# Patient Record
Sex: MALE | Race: WHITE | NOT HISPANIC OR LATINO | Employment: OTHER | URBAN - METROPOLITAN AREA
[De-identification: names, ages, dates, MRNs, and addresses within clinical notes are randomized per-mention and may not be internally consistent; named-entity substitution may affect disease eponyms.]

---

## 2022-10-13 ENCOUNTER — TELEPHONE (OUTPATIENT)
Dept: HEMATOLOGY/ONCOLOGY | Facility: CLINIC | Age: 82
End: 2022-10-13
Payer: COMMERCIAL

## 2022-10-13 DIAGNOSIS — C43.9 SKIN CANCER (MELANOMA): Primary | ICD-10-CM

## 2022-10-17 ENCOUNTER — OFFICE VISIT (OUTPATIENT)
Dept: HEMATOLOGY/ONCOLOGY | Facility: CLINIC | Age: 82
End: 2022-10-17
Payer: COMMERCIAL

## 2022-10-17 ENCOUNTER — LAB VISIT (OUTPATIENT)
Dept: LAB | Facility: HOSPITAL | Age: 82
End: 2022-10-17
Attending: OTOLARYNGOLOGY
Payer: COMMERCIAL

## 2022-10-17 ENCOUNTER — DOCUMENTATION ONLY (OUTPATIENT)
Dept: HEMATOLOGY/ONCOLOGY | Facility: CLINIC | Age: 82
End: 2022-10-17
Payer: COMMERCIAL

## 2022-10-17 VITALS
BODY MASS INDEX: 27.76 KG/M2 | SYSTOLIC BLOOD PRESSURE: 128 MMHG | DIASTOLIC BLOOD PRESSURE: 67 MMHG | HEART RATE: 60 BPM | OXYGEN SATURATION: 97 % | HEIGHT: 73 IN | TEMPERATURE: 97 F | RESPIRATION RATE: 18 BRPM | WEIGHT: 209.44 LBS

## 2022-10-17 DIAGNOSIS — C43.9 SKIN CANCER (MELANOMA): ICD-10-CM

## 2022-10-17 DIAGNOSIS — I10 PRIMARY HYPERTENSION: ICD-10-CM

## 2022-10-17 DIAGNOSIS — C43.4 MALIGNANT MELANOMA OF SCALP: ICD-10-CM

## 2022-10-17 LAB
ALBUMIN SERPL BCP-MCNC: 3.8 G/DL (ref 3.5–5.2)
ALP SERPL-CCNC: 92 U/L (ref 55–135)
ALT SERPL W/O P-5'-P-CCNC: 25 U/L (ref 10–44)
ANION GAP SERPL CALC-SCNC: 10 MMOL/L (ref 8–16)
AST SERPL-CCNC: 26 U/L (ref 10–40)
BILIRUB SERPL-MCNC: 1 MG/DL (ref 0.1–1)
BUN SERPL-MCNC: 17 MG/DL (ref 8–23)
CALCIUM SERPL-MCNC: 9.5 MG/DL (ref 8.7–10.5)
CHLORIDE SERPL-SCNC: 103 MMOL/L (ref 95–110)
CO2 SERPL-SCNC: 26 MMOL/L (ref 23–29)
CREAT SERPL-MCNC: 1.4 MG/DL (ref 0.5–1.4)
EST. GFR  (NO RACE VARIABLE): 50.5 ML/MIN/1.73 M^2
GLUCOSE SERPL-MCNC: 108 MG/DL (ref 70–110)
POTASSIUM SERPL-SCNC: 4.4 MMOL/L (ref 3.5–5.1)
PROT SERPL-MCNC: 6.4 G/DL (ref 6–8.4)
SODIUM SERPL-SCNC: 139 MMOL/L (ref 136–145)

## 2022-10-17 PROCEDURE — 3078F DIAST BP <80 MM HG: CPT | Mod: CPTII,S$GLB,, | Performed by: OTOLARYNGOLOGY

## 2022-10-17 PROCEDURE — 3074F SYST BP LT 130 MM HG: CPT | Mod: CPTII,S$GLB,, | Performed by: OTOLARYNGOLOGY

## 2022-10-17 PROCEDURE — 3078F PR MOST RECENT DIASTOLIC BLOOD PRESSURE < 80 MM HG: ICD-10-PCS | Mod: CPTII,S$GLB,, | Performed by: OTOLARYNGOLOGY

## 2022-10-17 PROCEDURE — 3288F FALL RISK ASSESSMENT DOCD: CPT | Mod: CPTII,S$GLB,, | Performed by: OTOLARYNGOLOGY

## 2022-10-17 PROCEDURE — 11105 PUNCH BX SKIN EA SEP/ADDL: CPT | Mod: S$GLB,,, | Performed by: OTOLARYNGOLOGY

## 2022-10-17 PROCEDURE — 36415 COLL VENOUS BLD VENIPUNCTURE: CPT | Mod: PN | Performed by: OTOLARYNGOLOGY

## 2022-10-17 PROCEDURE — 1159F PR MEDICATION LIST DOCUMENTED IN MEDICAL RECORD: ICD-10-PCS | Mod: CPTII,S$GLB,, | Performed by: OTOLARYNGOLOGY

## 2022-10-17 PROCEDURE — 99999 PR PBB SHADOW E&M-EST. PATIENT-LVL V: ICD-10-PCS | Mod: PBBFAC,,, | Performed by: OTOLARYNGOLOGY

## 2022-10-17 PROCEDURE — 1101F PR PT FALLS ASSESS DOC 0-1 FALLS W/OUT INJ PAST YR: ICD-10-PCS | Mod: CPTII,S$GLB,, | Performed by: OTOLARYNGOLOGY

## 2022-10-17 PROCEDURE — 1160F PR REVIEW ALL MEDS BY PRESCRIBER/CLIN PHARMACIST DOCUMENTED: ICD-10-PCS | Mod: CPTII,S$GLB,, | Performed by: OTOLARYNGOLOGY

## 2022-10-17 PROCEDURE — 99203 OFFICE O/P NEW LOW 30 MIN: CPT | Mod: 25,S$GLB,, | Performed by: OTOLARYNGOLOGY

## 2022-10-17 PROCEDURE — 1126F PR PAIN SEVERITY QUANTIFIED, NO PAIN PRESENT: ICD-10-PCS | Mod: CPTII,S$GLB,, | Performed by: OTOLARYNGOLOGY

## 2022-10-17 PROCEDURE — 3074F PR MOST RECENT SYSTOLIC BLOOD PRESSURE < 130 MM HG: ICD-10-PCS | Mod: CPTII,S$GLB,, | Performed by: OTOLARYNGOLOGY

## 2022-10-17 PROCEDURE — 1159F MED LIST DOCD IN RCRD: CPT | Mod: CPTII,S$GLB,, | Performed by: OTOLARYNGOLOGY

## 2022-10-17 PROCEDURE — 3288F PR FALLS RISK ASSESSMENT DOCUMENTED: ICD-10-PCS | Mod: CPTII,S$GLB,, | Performed by: OTOLARYNGOLOGY

## 2022-10-17 PROCEDURE — 11104 PR PUNCH BIOPSY, SKIN, SINGLE LESION: ICD-10-PCS | Mod: S$GLB,,, | Performed by: OTOLARYNGOLOGY

## 2022-10-17 PROCEDURE — 11104 PUNCH BX SKIN SINGLE LESION: CPT | Mod: S$GLB,,, | Performed by: OTOLARYNGOLOGY

## 2022-10-17 PROCEDURE — 11105 PR PUNCH BIOPSY, SKIN, EA ADDTL LESION: ICD-10-PCS | Mod: S$GLB,,, | Performed by: OTOLARYNGOLOGY

## 2022-10-17 PROCEDURE — 1101F PT FALLS ASSESS-DOCD LE1/YR: CPT | Mod: CPTII,S$GLB,, | Performed by: OTOLARYNGOLOGY

## 2022-10-17 PROCEDURE — 1160F RVW MEDS BY RX/DR IN RCRD: CPT | Mod: CPTII,S$GLB,, | Performed by: OTOLARYNGOLOGY

## 2022-10-17 PROCEDURE — 80053 COMPREHEN METABOLIC PANEL: CPT | Mod: PN | Performed by: OTOLARYNGOLOGY

## 2022-10-17 PROCEDURE — 1126F AMNT PAIN NOTED NONE PRSNT: CPT | Mod: CPTII,S$GLB,, | Performed by: OTOLARYNGOLOGY

## 2022-10-17 PROCEDURE — 99203 PR OFFICE/OUTPT VISIT, NEW, LEVL III, 30-44 MIN: ICD-10-PCS | Mod: 25,S$GLB,, | Performed by: OTOLARYNGOLOGY

## 2022-10-17 PROCEDURE — 99999 PR PBB SHADOW E&M-EST. PATIENT-LVL V: CPT | Mod: PBBFAC,,, | Performed by: OTOLARYNGOLOGY

## 2022-10-17 RX ORDER — AMIODARONE HYDROCHLORIDE 200 MG/1
200 TABLET ORAL
COMMUNITY
Start: 2022-10-10

## 2022-10-17 RX ORDER — ACEBUTOLOL HYDROCHLORIDE 200 MG/1
200 CAPSULE ORAL NIGHTLY
COMMUNITY
Start: 2022-05-30

## 2022-10-17 RX ORDER — VALSARTAN AND HYDROCHLOROTHIAZIDE 80; 12.5 MG/1; MG/1
1 TABLET, FILM COATED ORAL
COMMUNITY
Start: 2022-09-02

## 2022-10-17 RX ORDER — ACETAMINOPHEN 325 MG/1
650 TABLET ORAL EVERY 4 HOURS PRN
COMMUNITY
Start: 2021-12-19

## 2022-10-17 NOTE — PROGRESS NOTES
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  Patient presented about 3-4 months ago scalp. Tneder to touch. NO bleeding. No pruritis. Applying triple antibiotic S/P biopsy    Patient denies numbness and weakness of his face.  He denies parotid and neck masses.    He has no previous history of melanoma or non melanoma skin cancer and no family history of melanoma.  Hx of sunburns as a child/young adult.    ROS: see HPI  Constitutional: Negative for activity change and appetite change, weight loss.   Eyes: Negative for discharge, visual changes.   Respiratory: Negative for difficulty breathing and wheezing   Cardiovascular: Negative for chest pain.   Gastrointestinal: Negative for abdominal distention and abdominal pain.   Endocrine: Negative for cold intolerance and heat intolerance.   Genitourinary: Negative for dysuria.   Musculoskeletal: Negative for gait problem, muscle pain and joint swelling.   Skin: Negative for color change and pallor; negative for skin lesions.   Neurological: Negative for syncope and weakness; no numbness face.   Psychiatric/Behavioral: Negative for agitation and confusion; negative for depression.    Physical Exam:      Constitutional  General Appearance: well nourished, well-developed, alert, oriented, in no acute distress  Communication: ability, understanding, normal  Head and Face  Inspection: normocephalic, atraumatic, biopsy site frontal scalp with adjacent  1-2 mm lesions suspicious for satellite lesions (photos in Epic); original photos prior to biopsy showed thick purplish lesion   Palpation: no stepoffs, sinus tenderness or masses  Parotid glands: no masses, stones, swelling or  tenderness  Eyes  Ocular Motility / Alignment: normal alignment, motility, no proptosis, enophthalmus or nystagmus  Conjunctiva: not injected  Eyelids: no hooding, lag, entropion, or ectropion  Ears  Hearing: speech reception thresholds grossly normal  External Ears: no auricle lesions, non-tender, mobile to palpation  Otoscopy:  Right Ear: no tympanic membrane lesions, perforations, or effusion, normal EAC  Left Ear: no tympanic membrane lesions, perforations, or effusion, normal EAC  Oral Cavity / Oropharynx  Lips: upper and lower lips pink and moist  Teeth: good dentition  Gingiva: healthy  Oral Mucosa: moist, no mucosal lesions  Floor of Mouth: normal, no lesions, salivary ducts patent  Tongue: moist, normal mobility, no lesions  Palate: soft and hard palates without lesions or ulcers  Oropharynx: tonsils and walls without erythema, exudate, base of tongue soft to palpation  Neck  Inspection and Palpation: no erythema, induration, emphysema, tenderness or masses  Larynx and Trachea: normal position; normal crepitus  Thyroid: no tenderness, enlargement or nodules  Submandibular Glands: no masses or tenderness  Lymphatic:  Anterior, Posterior, Submandibular, Submental, Supraclavicular: no lymphadenopathy present  Neurological  Cranial Nerves: grossly intact  General: no focal deficits  Psychiatric  Orientation: oriented to time, place and person  Mood and Affect: no depression, anxiety or agitation    PATH  Mid frontal scalp   Histologic type:  Spindle melanoma   Breslow depth:  At least 2.1 mm   Ulceration:  Not identified  Margins:  Invasive melanoma extends to the edge and base   Keith level: At least 4   Vertical growth phase: Present  Mitoses:  12 per squared mm   Tumor infiltrating lymphocytes:  Not identified   Regression:  Not identified   Lymphovascular space invasion:  Not identified   Satellite nodule: Not identified Lee  Perineural invasion:  Not identified   Coexisting nevus: Not identified    Eighth addition AJCC staging:  At least P T3a pNX P MX     Assessment:   At least stage III melanoma scalp with possible satellite lesions    Plan:   PET CT  MRI brain  Biopsy X 2 pigmented lesions to eval for melanoma    Procedure:  It was recommend patient undergo biopsy of the 2 adjacent pigmented lesions evaluate for satellite lesions.  Risks, benefits and alternatives were discussed with the patient, questions were answered and the consent was signed.  Risks include but are not limited to bleeding, scarring, infection, hematoma, need for additional treatment.    The area to be biopsied was cleaned with alcohol.  It was then injected with 1% lidocaine with 1 100,000 epinephrine for hemostasis and anesthesia.  It was then prepped with Betadine and draped in sterile fashion.  Using 2 different 2 mm punch biopsies each lesion was individually biopsied and sent to pathology for routine analysis.  Hemostasis was obtained using silver nitrate.  There was minimal oozing and therefore a single interrupted stitch of 5 0 nylon was used to close each punch biopsy site.  The area was cleaned and antibiotic was placed on the biopsy site.  Patient tolerated the procedure well.  There were no complications.  He was discharged from clinic in good condition.

## 2022-10-24 DIAGNOSIS — C43.9 SKIN CANCER (MELANOMA): Primary | ICD-10-CM

## 2022-10-26 ENCOUNTER — CLINICAL SUPPORT (OUTPATIENT)
Dept: HEMATOLOGY/ONCOLOGY | Facility: CLINIC | Age: 82
End: 2022-10-26
Payer: COMMERCIAL

## 2022-10-26 VITALS
SYSTOLIC BLOOD PRESSURE: 125 MMHG | DIASTOLIC BLOOD PRESSURE: 66 MMHG | HEART RATE: 56 BPM | OXYGEN SATURATION: 98 % | RESPIRATION RATE: 14 BRPM | TEMPERATURE: 97 F

## 2022-10-26 PROBLEM — I10 PRIMARY HYPERTENSION: Status: ACTIVE | Noted: 2022-10-26

## 2022-10-26 PROBLEM — C43.4 MALIGNANT MELANOMA OF SCALP: Status: ACTIVE | Noted: 2022-10-26

## 2022-10-26 PROCEDURE — 99999 PR PBB SHADOW E&M-EST. PATIENT-LVL III: ICD-10-PCS | Mod: PBBFAC,,,

## 2022-10-26 PROCEDURE — 99999 PR PBB SHADOW E&M-EST. PATIENT-LVL III: CPT | Mod: PBBFAC,,,

## 2022-10-26 NOTE — PROGRESS NOTES
Pt s/p punch bx by Dr Radford 10/17/22, in today for nurse visit for stitch removal.   Pt with bx sites free from redness, swelling, or drainage.  Denies pain to sites.  Stitches removed without difficulty, pt tolerated procedure well.  Instructed to apply Aquaphor ointment to sites, verbalized understanding.     Reviewed upcoming scans and appointments with pt and wife.

## 2022-11-01 ENCOUNTER — HOSPITAL ENCOUNTER (OUTPATIENT)
Dept: RADIOLOGY | Facility: HOSPITAL | Age: 82
Discharge: HOME | End: 2022-11-01
Attending: OTOLARYNGOLOGY
Payer: COMMERCIAL

## 2022-11-01 DIAGNOSIS — C43.9 SKIN CANCER (MELANOMA): ICD-10-CM

## 2022-11-01 LAB — GLUCOSE SERPL-MCNC: 91 MG/DL (ref 70–110)

## 2022-11-01 PROCEDURE — A9585 GADOBUTROL INJECTION: HCPCS | Mod: PO | Performed by: OTOLARYNGOLOGY

## 2022-11-01 PROCEDURE — 78816 NM PET CT WHOLE BODY: ICD-10-PCS | Mod: 26,PI,, | Performed by: RADIOLOGY

## 2022-11-01 PROCEDURE — 70553 MRI BRAIN W WO CONTRAST: ICD-10-PCS | Mod: 26,,, | Performed by: RADIOLOGY

## 2022-11-01 PROCEDURE — 25500020 PHARM REV CODE 255: Mod: PO | Performed by: OTOLARYNGOLOGY

## 2022-11-01 PROCEDURE — 70553 MRI BRAIN STEM W/O & W/DYE: CPT | Mod: TC,PO

## 2022-11-01 PROCEDURE — 78816 PET IMAGE W/CT FULL BODY: CPT | Mod: TC,PN

## 2022-11-01 PROCEDURE — 78816 PET IMAGE W/CT FULL BODY: CPT | Mod: 26,PI,, | Performed by: RADIOLOGY

## 2022-11-01 PROCEDURE — 70553 MRI BRAIN STEM W/O & W/DYE: CPT | Mod: 26,,, | Performed by: RADIOLOGY

## 2022-11-01 RX ORDER — GADOBUTROL 604.72 MG/ML
10 INJECTION INTRAVENOUS
Status: COMPLETED | OUTPATIENT
Start: 2022-11-01 | End: 2022-11-01

## 2022-11-01 RX ADMIN — GADOBUTROL 10 ML: 604.72 INJECTION INTRAVENOUS at 01:11

## 2022-11-01 NOTE — PROGRESS NOTES
PET Imaging Questionnaire    Are you a Diabetic? Recent Blood Sugar level? No    Are you anemic? Bone Marrow Stimulation Meds? No    Have you had a CT Scan, if so when & where was your last one? No    Have you had a PET Scan, if so when & where was your last one? No    Chemotherapy or currently on Chemotherapy? No    Radiation therapy? No    Surgical History:   Past Surgical History:   Procedure Laterality Date    CARDIOVERSION N/A     FOREIGN BODY REMOVAL Right     chest--piece of wooden board    SPLENECTOMY, TOTAL      TONSILLECTOMY      WRIST SURGERY Right         Have you been fasting for at least 6 hours? Yes    Is there any chance you may be pregnant or breastfeeding? No    Assay: 15.0 MCi@:10.06   Injection Site:rt ac     Residual: 1.983 mCi@: 10.08   Technologist: Dayna Lara Injected:13.01mCi

## 2022-11-02 NOTE — PROGRESS NOTES
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  Patient presented about 3-4 months ago scalp. Tneder to touch. NO bleeding. No pruritis. Applying triple antibiotic S/P biopsy    Patient denies numbness and weakness of his face.  He denies parotid and neck masses.    He has no previous history of melanoma or non melanoma skin cancer and no family history of melanoma.  Hx of sunburns as a child/young adult.    11/3/2022  Patient is here today follow-up biopsy results and imaging studies.  He has no new complaints.    ROS: see HPI  Constitutional: Negative for activity change and appetite change, weight loss.   Eyes: Negative for discharge, visual changes.   Respiratory: Negative for difficulty breathing and wheezing   Cardiovascular: Negative for chest pain.   Gastrointestinal: Negative for abdominal distention and abdominal pain.   Endocrine: Negative for cold intolerance and heat intolerance.   Genitourinary: Negative for dysuria.   Musculoskeletal: Negative for gait problem, muscle pain and joint swelling.   Skin: Negative for color change and pallor; negative for skin lesions.   Neurological: Negative for syncope and weakness; no numbness face.   Psychiatric/Behavioral: Negative for agitation and confusion; negative for depression.    Physical Exam:      Constitutional  General Appearance: well nourished, well-developed, alert, oriented, in no acute distress  Communication: ability, understanding, normal  Head and Face  Inspection: normocephalic, atraumatic, biopsy site frontal scalp with adjacent  biopsy sites (photos in Epic); original photos prior to biopsy showed thick purplish lesion   Palpation: no stepoffs,  sinus tenderness or masses  Parotid glands: no masses, stones, swelling or tenderness  Neurological  Cranial Nerves: grossly intact  General: no focal deficits  Psychiatric  Orientation: oriented to time, place and person  Mood and Affect: no depression, anxiety or agitation    PATH: punch biopsy results  #1: negative  #2:  Atypical melanocytic proliferation-sent to dermatopathology for 2nd opinion    PATH: original biopsy  Mid frontal scalp   Histologic type:  Spindle melanoma   Breslow depth:  At least 2.1 mm   Ulceration:  Not identified  Margins:  Invasive melanoma extends to the edge and base   Keith level: At least 4   Vertical growth phase: Present  Mitoses:  12 per squared mm   Tumor infiltrating lymphocytes:  Not identified   Regression:  Not identified   Lymphovascular space invasion:  Not identified   Satellite nodule: Not identified Lee  Perineural invasion:  Not identified   Coexisting nevus: Not identified   Eighth addition AJCC staging:  At least P T3a pNX P MX    MRI brain  FINDINGS:  Intracranial compartment:     There is no acute intracranial abnormality.  There is mild-to-moderate generalized cerebral and only mild cerebellar volume loss.  Also, there is only a mild burden of nonspecific white matter change.  There is no intracranial hemorrhage.  There is no parenchymal mass or mass effect.  There are no regions of restricted diffusion to suggest acute infarction.  There is no abnormal extra-axial fluid collection.  There is no abnormal enhancement in the brain or its covering.  The basilar cisterns are open.  Flow voids indicating patency are present in the major vessels at the base of the brain.  The cerebellar tonsils are normal position.  Sellar structures are normal.     Skull/extracranial contents:     There is prominent soft tissue surrounding the odontoid tip which may represent pannus formation related to possible osteoarthritis or rheumatoid arthritis. This is nonspecific.  There is a  defect in the left paramedian posterior frontal parietal scalp which extends to the outer cortical margin of the calvarium without obvious intra osseous or intracranial extension.  This scalp soft tissue defect restricts diffusion and enhances heterogeneously and likely represents the site of melanoma as provided in the clinical history.     Impression:     1. There is no acute or significant intracranial abnormality.  There is volume loss with only mild nonspecific white matter change.  There is no intracranial hemorrhage, mass, acute infarction or abnormal enhancement in the brain.  2. There is a scalp/soft tissue defect in the left paramedian posterior frontal parietal scalp consistent with the provided history melanoma.  This extends to the outer table of the calvarium without obvious intra osseous or intracranial extension.      PET CT  NM PET CT WHOLE BODY     CLINICAL HISTORY:  melanoma;  Malignant melanoma of skin, unspecified     FINDINGS:  Head/neck:     There is a markedly hypermetabolic cutaneous nodule at the skull vertex along the midline consistent with the patient's known melanoma.  This is best visualized and measured on the fused PET-CT coronal and sagittal images and measures 2.9 x 2.3 x 1.5 cm with a max SUV of 13.9.  No lymphadenopathy is present.     Chest:     There is a hypermetabolic nodule within the anterior aspect of the right anterior deltoid muscle highly suspicious for metastatic disease.  This cannot be well visualized on the CT images and is best visualized on the PET-CT fused images.  On image 134 of the PET-CT fused axials, the nodule measures 1.8 x 1.2 cm and has a max SUV of 9.4.  No other significant abnormal hypermetabolic foci are identified within the chest.  No hypermetabolic pulmonary nodules, lymphadenopathy, pleural effusion, or pericardial effusion are present.     Abdomen/pelvis:   No significant abnormal hypermetabolic foci are identified within the abdomen and pelvis.   No lymphadenopathy is present.  The adrenal glands are normal.     Skeleton:   No significant abnormal hypermetabolic foci are identified within the skeleton.  There are no findings to suggest osseous metastatic disease.     Upper/lower extremities:   There is a hypermetabolic cutaneous nodule at the right posterior elbow suspicious for a skin metastasis.  This is best visualized on the PET-CT fused coronal images and on image 134 measures 3.0 x 1.6 cm with a max SUV of 11.0.     Impression:     1. Hypermetabolic cutaneous nodule within the scalp at the skull vertex consistent with the patient's known melanoma.  2. Hypermetabolic nodule within the right anterior deltoid muscle anteriorly suspicious for metastatic disease.  3. Hypermetabolic cutaneous nodule in the posterior right elbow suspicious for metastatic disease.      Assessment:   At least stage III melanoma scalp with possible satellite lesion-final path pending  PET CT with possible metastases to muscle --he does report previous history to the area of the deltoid when he fell on a piece of wood last year upon questioning    Plan:   PET scan reviewed with radiologist who confirmed the suspicion of these 2 lesions.    Ultrasound-guided core biopsy was ordered.  Patient will follow-up for results

## 2022-11-03 ENCOUNTER — OFFICE VISIT (OUTPATIENT)
Dept: HEMATOLOGY/ONCOLOGY | Facility: CLINIC | Age: 82
End: 2022-11-03
Payer: COMMERCIAL

## 2022-11-03 VITALS
SYSTOLIC BLOOD PRESSURE: 131 MMHG | OXYGEN SATURATION: 98 % | WEIGHT: 210.56 LBS | BODY MASS INDEX: 27.91 KG/M2 | HEART RATE: 59 BPM | RESPIRATION RATE: 18 BRPM | HEIGHT: 73 IN | DIASTOLIC BLOOD PRESSURE: 71 MMHG | TEMPERATURE: 98 F

## 2022-11-03 DIAGNOSIS — C43.4 MALIGNANT MELANOMA OF SCALP: Primary | ICD-10-CM

## 2022-11-03 PROCEDURE — 1160F PR REVIEW ALL MEDS BY PRESCRIBER/CLIN PHARMACIST DOCUMENTED: ICD-10-PCS | Mod: CPTII,S$GLB,, | Performed by: OTOLARYNGOLOGY

## 2022-11-03 PROCEDURE — 3075F PR MOST RECENT SYSTOLIC BLOOD PRESS GE 130-139MM HG: ICD-10-PCS | Mod: CPTII,S$GLB,, | Performed by: OTOLARYNGOLOGY

## 2022-11-03 PROCEDURE — 3078F DIAST BP <80 MM HG: CPT | Mod: CPTII,S$GLB,, | Performed by: OTOLARYNGOLOGY

## 2022-11-03 PROCEDURE — 99999 PR PBB SHADOW E&M-EST. PATIENT-LVL IV: ICD-10-PCS | Mod: PBBFAC,,, | Performed by: OTOLARYNGOLOGY

## 2022-11-03 PROCEDURE — 1159F MED LIST DOCD IN RCRD: CPT | Mod: CPTII,S$GLB,, | Performed by: OTOLARYNGOLOGY

## 2022-11-03 PROCEDURE — 99213 OFFICE O/P EST LOW 20 MIN: CPT | Mod: S$GLB,,, | Performed by: OTOLARYNGOLOGY

## 2022-11-03 PROCEDURE — 1101F PT FALLS ASSESS-DOCD LE1/YR: CPT | Mod: CPTII,S$GLB,, | Performed by: OTOLARYNGOLOGY

## 2022-11-03 PROCEDURE — 1160F RVW MEDS BY RX/DR IN RCRD: CPT | Mod: CPTII,S$GLB,, | Performed by: OTOLARYNGOLOGY

## 2022-11-03 PROCEDURE — 99999 PR PBB SHADOW E&M-EST. PATIENT-LVL IV: CPT | Mod: PBBFAC,,, | Performed by: OTOLARYNGOLOGY

## 2022-11-03 PROCEDURE — 1159F PR MEDICATION LIST DOCUMENTED IN MEDICAL RECORD: ICD-10-PCS | Mod: CPTII,S$GLB,, | Performed by: OTOLARYNGOLOGY

## 2022-11-03 PROCEDURE — 3078F PR MOST RECENT DIASTOLIC BLOOD PRESSURE < 80 MM HG: ICD-10-PCS | Mod: CPTII,S$GLB,, | Performed by: OTOLARYNGOLOGY

## 2022-11-03 PROCEDURE — 3288F PR FALLS RISK ASSESSMENT DOCUMENTED: ICD-10-PCS | Mod: CPTII,S$GLB,, | Performed by: OTOLARYNGOLOGY

## 2022-11-03 PROCEDURE — 3288F FALL RISK ASSESSMENT DOCD: CPT | Mod: CPTII,S$GLB,, | Performed by: OTOLARYNGOLOGY

## 2022-11-03 PROCEDURE — 3075F SYST BP GE 130 - 139MM HG: CPT | Mod: CPTII,S$GLB,, | Performed by: OTOLARYNGOLOGY

## 2022-11-03 PROCEDURE — 1126F AMNT PAIN NOTED NONE PRSNT: CPT | Mod: CPTII,S$GLB,, | Performed by: OTOLARYNGOLOGY

## 2022-11-03 PROCEDURE — 99213 PR OFFICE/OUTPT VISIT, EST, LEVL III, 20-29 MIN: ICD-10-PCS | Mod: S$GLB,,, | Performed by: OTOLARYNGOLOGY

## 2022-11-03 PROCEDURE — 1126F PR PAIN SEVERITY QUANTIFIED, NO PAIN PRESENT: ICD-10-PCS | Mod: CPTII,S$GLB,, | Performed by: OTOLARYNGOLOGY

## 2022-11-03 PROCEDURE — 1101F PR PT FALLS ASSESS DOC 0-1 FALLS W/OUT INJ PAST YR: ICD-10-PCS | Mod: CPTII,S$GLB,, | Performed by: OTOLARYNGOLOGY

## 2022-11-04 ENCOUNTER — TELEPHONE (OUTPATIENT)
Dept: HEMATOLOGY/ONCOLOGY | Facility: CLINIC | Age: 82
End: 2022-11-04
Payer: COMMERCIAL

## 2022-11-04 NOTE — TELEPHONE ENCOUNTER
Noted that pt's FNA ordered by Dr Radford was scheduled 11/16//22.  Assisted pt scheduling f/u appt with Dr Radford.

## 2022-11-15 ENCOUNTER — TELEPHONE (OUTPATIENT)
Dept: HEMATOLOGY/ONCOLOGY | Facility: CLINIC | Age: 82
End: 2022-11-15
Payer: COMMERCIAL

## 2022-11-15 NOTE — TELEPHONE ENCOUNTER
Wife calling to check on auth status for pt's FNA scheduled tomorrow. Wife received a call from [facility] stating FNA not authorized.  Wife called insurance company who stated that authorization was not needed for pts FNA.  Wife called back [facility] and confirmed with another person that the FNA was approved and will be done tomorrow.  Wife calling me for auth verification.  According to referral the authorization is granted and pt's FNA is going to be done tomorrow. Pt's wife verbalized appreciation for the assistance. ---- Message from Areglia Crowder sent at 11/15/2022  3:57 PM CST -----  Type: Needs Medical Advice  Who Called:  Nohemi (spouse)  Symptoms (please be specific):    How long has patient had these symptoms:    Pharmacy name and phone #:    Best Call Back Number:   Additional Information: Ms. Song is requesting a call back from Kierra regarding her  biopsy that is scheduled for tomorrow.

## 2022-11-28 ENCOUNTER — OFFICE VISIT (OUTPATIENT)
Dept: HEMATOLOGY/ONCOLOGY | Facility: CLINIC | Age: 82
End: 2022-11-28
Payer: COMMERCIAL

## 2022-11-28 VITALS
RESPIRATION RATE: 18 BRPM | BODY MASS INDEX: 28.43 KG/M2 | TEMPERATURE: 97 F | DIASTOLIC BLOOD PRESSURE: 77 MMHG | SYSTOLIC BLOOD PRESSURE: 153 MMHG | OXYGEN SATURATION: 97 % | HEART RATE: 54 BPM | HEIGHT: 73 IN | WEIGHT: 214.5 LBS

## 2022-11-28 DIAGNOSIS — C43.4 MALIGNANT MELANOMA OF SCALP: Primary | ICD-10-CM

## 2022-11-28 PROCEDURE — 1101F PR PT FALLS ASSESS DOC 0-1 FALLS W/OUT INJ PAST YR: ICD-10-PCS | Mod: CPTII,S$GLB,, | Performed by: OTOLARYNGOLOGY

## 2022-11-28 PROCEDURE — 3078F PR MOST RECENT DIASTOLIC BLOOD PRESSURE < 80 MM HG: ICD-10-PCS | Mod: CPTII,S$GLB,, | Performed by: OTOLARYNGOLOGY

## 2022-11-28 PROCEDURE — 1126F AMNT PAIN NOTED NONE PRSNT: CPT | Mod: CPTII,S$GLB,, | Performed by: OTOLARYNGOLOGY

## 2022-11-28 PROCEDURE — 3078F DIAST BP <80 MM HG: CPT | Mod: CPTII,S$GLB,, | Performed by: OTOLARYNGOLOGY

## 2022-11-28 PROCEDURE — 99213 OFFICE O/P EST LOW 20 MIN: CPT | Mod: S$GLB,,, | Performed by: OTOLARYNGOLOGY

## 2022-11-28 PROCEDURE — 1160F PR REVIEW ALL MEDS BY PRESCRIBER/CLIN PHARMACIST DOCUMENTED: ICD-10-PCS | Mod: CPTII,S$GLB,, | Performed by: OTOLARYNGOLOGY

## 2022-11-28 PROCEDURE — 3077F PR MOST RECENT SYSTOLIC BLOOD PRESSURE >= 140 MM HG: ICD-10-PCS | Mod: CPTII,S$GLB,, | Performed by: OTOLARYNGOLOGY

## 2022-11-28 PROCEDURE — 1101F PT FALLS ASSESS-DOCD LE1/YR: CPT | Mod: CPTII,S$GLB,, | Performed by: OTOLARYNGOLOGY

## 2022-11-28 PROCEDURE — 1159F MED LIST DOCD IN RCRD: CPT | Mod: CPTII,S$GLB,, | Performed by: OTOLARYNGOLOGY

## 2022-11-28 PROCEDURE — 3288F FALL RISK ASSESSMENT DOCD: CPT | Mod: CPTII,S$GLB,, | Performed by: OTOLARYNGOLOGY

## 2022-11-28 PROCEDURE — 3288F PR FALLS RISK ASSESSMENT DOCUMENTED: ICD-10-PCS | Mod: CPTII,S$GLB,, | Performed by: OTOLARYNGOLOGY

## 2022-11-28 PROCEDURE — 1160F RVW MEDS BY RX/DR IN RCRD: CPT | Mod: CPTII,S$GLB,, | Performed by: OTOLARYNGOLOGY

## 2022-11-28 PROCEDURE — 99999 PR PBB SHADOW E&M-EST. PATIENT-LVL V: ICD-10-PCS | Mod: PBBFAC,,, | Performed by: OTOLARYNGOLOGY

## 2022-11-28 PROCEDURE — 99213 PR OFFICE/OUTPT VISIT, EST, LEVL III, 20-29 MIN: ICD-10-PCS | Mod: S$GLB,,, | Performed by: OTOLARYNGOLOGY

## 2022-11-28 PROCEDURE — 1159F PR MEDICATION LIST DOCUMENTED IN MEDICAL RECORD: ICD-10-PCS | Mod: CPTII,S$GLB,, | Performed by: OTOLARYNGOLOGY

## 2022-11-28 PROCEDURE — 1126F PR PAIN SEVERITY QUANTIFIED, NO PAIN PRESENT: ICD-10-PCS | Mod: CPTII,S$GLB,, | Performed by: OTOLARYNGOLOGY

## 2022-11-28 PROCEDURE — 99999 PR PBB SHADOW E&M-EST. PATIENT-LVL V: CPT | Mod: PBBFAC,,, | Performed by: OTOLARYNGOLOGY

## 2022-11-28 PROCEDURE — 3077F SYST BP >= 140 MM HG: CPT | Mod: CPTII,S$GLB,, | Performed by: OTOLARYNGOLOGY

## 2022-11-28 NOTE — PROGRESS NOTES
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 Chiqui Wesely.  Patient presented about 3-4 months ago scalp. Tneder to touch. NO bleeding. No pruritis. Applying triple antibiotic S/P biopsy    Patient denies numbness and weakness of his face.  He denies parotid and neck masses.    He has no previous history of melanoma or non melanoma skin cancer and no family history of melanoma.  Hx of sunburns as a child/young adult.    11/3/2022  Patient is here today follow-up biopsy results and imaging studies.  He has no new complaints.    11/28/2022  Patient is here today for biopsy results of a possible metastasis and to discuss treatment planning.  FNA was negative for metastatic lesion.  Patient has no new complaints.    ROS: see HPI  Constitutional: Negative for activity change and appetite change, weight loss.   Eyes: Negative for discharge, visual changes.   Respiratory: Negative for difficulty breathing and wheezing   Cardiovascular: Negative for chest pain.   Gastrointestinal: Negative for abdominal distention and abdominal pain.   Endocrine: Negative for cold intolerance and heat intolerance.   Genitourinary: Negative for dysuria.   Musculoskeletal: Negative for gait problem, muscle pain and joint swelling.   Skin: Negative for color change and pallor; negative for skin lesions.   Neurological: Negative for syncope and weakness; no numbness face.   Psychiatric/Behavioral: Negative for agitation and confusion; negative for depression.    Physical Exam:      Constitutional  General Appearance: well nourished, well-developed, alert, oriented, in no acute distress  Communication: ability, understanding, normal  Head and Face  Inspection:  normocephalic, atraumatic, biopsy site vertex scalp-thick lesion with crater at biopsy; original photos prior to biopsy showed thick purplish lesion   Palpation: no stepoffs, sinus tenderness or masses  Parotid glands: no masses, stones, swelling or tenderness  Neurological  Cranial Nerves: grossly intact  General: no focal deficits  Psychiatric  Orientation: oriented to time, place and person  Mood and Affect: no depression, anxiety or agitation    PATH: original biopsy  Mid frontal scalp   Histologic type:  Spindle melanoma   Breslow depth:  At least 2.1 mm   Ulceration:  Not identified  Margins:  Invasive melanoma extends to the edge and base   Keith level: At least 4   Vertical growth phase: Present  Mitoses:  12 per squared mm   Tumor infiltrating lymphocytes:  Not identified   Regression:  Not identified   Lymphovascular space invasion:  Not identified   Satellite nodule: Not identified Lee  Perineural invasion:  Not identified   Coexisting nevus: Not identified   Eighth addition AJCC staging:  At least P T3a pNX P MX    Punch biopsies in clinic:  Skin of scalp, punch biopsy 1.: No tumor seen   Skin of scalp, punch biopsy 2. 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  There is mild-to-moderate generalized cerebral and only mild cerebellar volume loss.  Also, there is only a mild burden of nonspecific white matter change.  There is no intracranial hemorrhage.  There is no parenchymal mass or mass effect.  There are no regions of restricted diffusion to suggest acute infarction.  There is no abnormal extra-axial fluid collection.  There is no abnormal  enhancement in the brain or its covering.  The basilar cisterns are open.  Flow voids indicating patency are present in the major vessels at the base of the brain.  The cerebellar tonsils are normal position.  Sellar structures are normal.     Skull/extracranial contents:     There is prominent soft tissue surrounding the odontoid tip which may represent pannus formation related to possible osteoarthritis or rheumatoid arthritis. This is nonspecific.  There is a defect in the left paramedian posterior frontal parietal scalp which extends to the outer cortical margin of the calvarium without obvious intra osseous or intracranial extension.  This scalp soft tissue defect restricts diffusion and enhances heterogeneously and likely represents the site of melanoma as provided in the clinical history.     Impression:     1. There is no acute or significant intracranial abnormality.  There is volume loss with only mild nonspecific white matter change.  There is no intracranial hemorrhage, mass, acute infarction or abnormal enhancement in the brain.  2. There is a scalp/soft tissue defect in the left paramedian posterior frontal parietal scalp consistent with the provided history melanoma.  This extends to the outer table of the calvarium without obvious intra osseous or intracranial extension.      PET CT  NM PET CT WHOLE BODY     CLINICAL HISTORY:  melanoma;  Malignant melanoma of skin, unspecified     FINDINGS:  Head/neck:     There is a markedly hypermetabolic cutaneous nodule at the skull vertex along the midline consistent with the patient's known melanoma.  This is best visualized and measured on the fused PET-CT coronal and sagittal images and measures 2.9 x 2.3 x 1.5 cm with a max SUV of 13.9.  No lymphadenopathy is present.     Chest:     There is a hypermetabolic nodule within the anterior aspect of the right anterior deltoid muscle highly suspicious for metastatic disease.  This cannot be well visualized on the  CT images and is best visualized on the PET-CT fused images.  On image 134 of the PET-CT fused axials, the nodule measures 1.8 x 1.2 cm and has a max SUV of 9.4.  No other significant abnormal hypermetabolic foci are identified within the chest.  No hypermetabolic pulmonary nodules, lymphadenopathy, pleural effusion, or pericardial effusion are present.     Abdomen/pelvis:   No significant abnormal hypermetabolic foci are identified within the abdomen and pelvis.  No lymphadenopathy is present.  The adrenal glands are normal.     Skeleton:   No significant abnormal hypermetabolic foci are identified within the skeleton.  There are no findings to suggest osseous metastatic disease.     Upper/lower extremities:   There is a hypermetabolic cutaneous nodule at the right posterior elbow suspicious for a skin metastasis.  This is best visualized on the PET-CT fused coronal images and on image 134 measures 3.0 x 1.6 cm with a max SUV of 11.0.     Impression:     1. Hypermetabolic cutaneous nodule within the scalp at the skull vertex consistent with the patient's known melanoma.  2. Hypermetabolic nodule within the right anterior deltoid muscle anteriorly suspicious for metastatic disease.  3. Hypermetabolic cutaneous nodule in the posterior right elbow suspicious for metastatic disease.      Assessment:   At least stage III melanoma scalp     Plan:   Wide local excision with delayed reconstruction and sentinel lymph node biopsy   98

## 2022-12-08 DIAGNOSIS — G89.18 POST-OP PAIN: Primary | ICD-10-CM

## 2022-12-08 RX ORDER — HYDROCODONE BITARTRATE AND ACETAMINOPHEN 10; 325 MG/1; MG/1
1 TABLET ORAL EVERY 6 HOURS PRN
Qty: 28 TABLET | Refills: 0 | Status: SHIPPED | OUTPATIENT
Start: 2022-12-08 | End: 2022-12-15

## 2022-12-09 ENCOUNTER — TELEPHONE (OUTPATIENT)
Dept: HEMATOLOGY/ONCOLOGY | Facility: CLINIC | Age: 82
End: 2022-12-09
Payer: COMMERCIAL

## 2022-12-09 NOTE — TELEPHONE ENCOUNTER
Pt s/p EXCISION, LESION scalp-melanoma by Dr Radford on 12/8/22.  Pt states he is doing well.  Denies pain, redness, drainage or swelling at surgical site.  States he is following post op instructions.  Post op appointment changed to earlier date with pt approval of day and time.  Pt instructed to call for any questions or concerns.

## 2022-12-13 NOTE — PROGRESS NOTES
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  Patient presented about 3-4 months ago scalp. Tneder to touch. NO bleeding. No pruritis. Applying triple antibiotic S/P biopsy    Patient denies numbness and weakness of his face.  He denies parotid and neck masses.    He has no previous history of melanoma or non melanoma skin cancer and no family history of melanoma.  Hx of sunburns as a child/young adult.    11/3/2022  Patient is here today follow-up biopsy results and imaging studies.  He has no new complaints.    11/28/2022  Patient is here today for biopsy results of a possible metastasis and to discuss treatment planning.  FNA was negative for metastatic lesion.  Patient has no new complaints.    12/15/2022  Patient is here today for path and bolster removal.  He has no complaints.  He has had very minimal pain    ROS: see HPI  Constitutional: Negative for activity change and appetite change, weight loss.   Eyes: Negative for discharge, visual changes.   Respiratory: Negative for difficulty breathing and wheezing   Cardiovascular: Negative for chest pain.   Gastrointestinal: Negative for abdominal distention and abdominal pain.   Endocrine: Negative for cold intolerance and heat intolerance.   Genitourinary: Negative for dysuria.   Musculoskeletal: Negative for gait problem, muscle pain and joint swelling.   Skin: Negative for color change and pallor; negative for skin lesions.   Neurological: Negative for syncope and weakness; no numbness face.   Psychiatric/Behavioral: Negative for agitation and confusion; negative for depression.    Physical Exam:      Constitutional  General Appearance: well nourished,  well-developed, alert, oriented, in no acute distress  Communication: ability, understanding, normal  Head and Face  Inspection: normocephalic, bolster removed-defect clean; no bleeding  Palpation: no stepoffs, sinus tenderness or masses  Parotid glands: no masses, stones, swelling or tenderness  Neurological  Cranial Nerves: grossly intact  General: no focal deficits    Excision  SKIN, VERTEX SCALP, WIDE LOCAL EXCISION   - SPINDLE CELL MELANOMA   - PRESENT AT DEEP MARGIN   - MICROSATELLITE PRESET AT 12-3 O'CLOCK PERIPHERAL MARGIN     Comment:   Immunohistochemistry was performed with appropriate controls on block  1D based on the histopathologic findings and the results are  summarized as follows:   MelanA: Highlights melanocytes   Discussed with Dr. Radford on 12/13/22.     BRAF mutation testing has been ordered and the results will be reported    separately.     MELANOMA: SYNOPTIC REPORT   PROCEDURE:   Wide local excision   SPECIMEN LATERALITY: Vertex scalp   TUMOR SITE: Vertex scalp   GROSS TUMOR SIZE: 17 mm   MACROSCOPIC SATELLITE NODULE(S): Not identified   HISTOLOGIC TYPE: Spindle cell melanoma   SURGICAL MARGIN STATUS:   Invasive melanoma present at deep margin and 12-3:00 margin.   Margins negative for melanoma in-situ (2 mm to 3-6:00 margin)   MEASURED BRESLOW THICKNESS: 10.5 mm   DERMAL MITOTIC RATE (PER SQUARE MM): 12 mitoses per square mm   ULCERATION: Negative   GROWTH PHASE: Vertical   LEVEL  OF   INVASION (KEITH'S): Keith level V   MICROSATELLITOSIS: Present   NEUROTROPISM: Present   LYMPHOVASCULAR INVASION: Negative   TUMOR INFILTRATING LYMPHOCYTES: Present, non-brisk   TUMOR REGRESSION Negative   ASSOCIATED NEVUS Negative   IMMUNOHISTOCHEMICAL STUDIES: HMB45   IN-TRANSIT METASTASIS Negative   ADDITIONAL FINDINGS:   Not applicable   TNM CODE: pT4a pN1c (microsatellite with no known regional lymph node disease)       PATH: original biopsy  Mid frontal scalp   Histologic type:  Spindle melanoma   Breslow  depth:  At least 2.1 mm   Ulceration:  Not identified  Margins:  Invasive melanoma extends to the edge and base   Keith level: At least 4   Vertical growth phase: Present  Mitoses:  12 per squared mm   Tumor infiltrating lymphocytes:  Not identified   Regression:  Not identified   Lymphovascular space invasion:  Not identified   Satellite nodule: Not identified Lee  Perineural invasion:  Not identified   Coexisting nevus: Not identified   Eighth addition AJCC staging:  At least P T3a pNX P MX    Punch biopsies in clinic:  Skin of scalp, punch biopsy 1.: No tumor seen   Skin of scalp, punch biopsy 2. 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  There is mild-to-moderate generalized cerebral and only mild cerebellar volume loss.  Also, there is only a mild burden of nonspecific white matter change.  There is no intracranial hemorrhage.  There is no parenchymal mass or mass effect.  There are no regions of restricted diffusion to suggest acute infarction.  There is no abnormal extra-axial fluid collection.  There is no abnormal enhancement in the brain or its covering.  The basilar cisterns are open.  Flow voids indicating patency are present in the major vessels at the base of the brain.  The cerebellar tonsils are normal position.  Sellar structures are normal.     Skull/extracranial contents:     There is prominent soft tissue surrounding the odontoid tip which may represent pannus formation related to possible osteoarthritis or rheumatoid arthritis. This is nonspecific.  There is a defect in the left paramedian posterior frontal parietal scalp which extends  to the outer cortical margin of the calvarium without obvious intra osseous or intracranial extension.  This scalp soft tissue defect restricts diffusion and enhances heterogeneously and likely represents the site of melanoma as provided in the clinical history.     Impression:     1. There is no acute or significant intracranial abnormality.  There is volume loss with only mild nonspecific white matter change.  There is no intracranial hemorrhage, mass, acute infarction or abnormal enhancement in the brain.  2. There is a scalp/soft tissue defect in the left paramedian posterior frontal parietal scalp consistent with the provided history melanoma.  This extends to the outer table of the calvarium without obvious intra osseous or intracranial extension.      PET CT  NM PET CT WHOLE BODY     CLINICAL HISTORY:  melanoma;  Malignant melanoma of skin, unspecified     FINDINGS:  Head/neck:     There is a markedly hypermetabolic cutaneous nodule at the skull vertex along the midline consistent with the patient's known melanoma.  This is best visualized and measured on the fused PET-CT coronal and sagittal images and measures 2.9 x 2.3 x 1.5 cm with a max SUV of 13.9.  No lymphadenopathy is present.     Chest:     There is a hypermetabolic nodule within the anterior aspect of the right anterior deltoid muscle highly suspicious for metastatic disease.  This cannot be well visualized on the CT images and is best visualized on the PET-CT fused images.  On image 134 of the PET-CT fused axials, the nodule measures 1.8 x 1.2 cm and has a max SUV of 9.4.  No other significant abnormal hypermetabolic foci are identified within the chest.  No hypermetabolic pulmonary nodules, lymphadenopathy, pleural effusion, or pericardial effusion are present.     Abdomen/pelvis:   No significant abnormal hypermetabolic foci are identified within the abdomen and pelvis.  No lymphadenopathy is present.  The adrenal glands are normal.      Skeleton:   No significant abnormal hypermetabolic foci are identified within the skeleton.  There are no findings to suggest osseous metastatic disease.     Upper/lower extremities:   There is a hypermetabolic cutaneous nodule at the right posterior elbow suspicious for a skin metastasis.  This is best visualized on the PET-CT fused coronal images and on image 134 measures 3.0 x 1.6 cm with a max SUV of 11.0.     Impression:     1. Hypermetabolic cutaneous nodule within the scalp at the skull vertex consistent with the patient's known melanoma.  2. Hypermetabolic nodule within the right anterior deltoid muscle anteriorly suspicious for metastatic disease.  3. Hypermetabolic cutaneous nodule in the posterior right elbow suspicious for metastatic disease.      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    Will discuss this case at multidisciplinary head neck tumor board today.  Treatment plan options: Proceed with re-resection followed with adjuvant immunotherapy or neoadjuvant immunotherapy followed by resection.  I will call the patient with results.    Addendum:   Case was discussed at tumor board.  It was recommended the patient undergo combined immunotherapy followed with re-resection.  Patient will be seen by Dr. Real Hart

## 2022-12-15 ENCOUNTER — OFFICE VISIT (OUTPATIENT)
Dept: HEMATOLOGY/ONCOLOGY | Facility: CLINIC | Age: 82
End: 2022-12-15
Payer: COMMERCIAL

## 2022-12-15 ENCOUNTER — TELEPHONE (OUTPATIENT)
Dept: HEMATOLOGY/ONCOLOGY | Facility: CLINIC | Age: 82
End: 2022-12-15
Payer: COMMERCIAL

## 2022-12-15 VITALS
HEART RATE: 95 BPM | OXYGEN SATURATION: 95 % | TEMPERATURE: 98 F | WEIGHT: 210.75 LBS | BODY MASS INDEX: 27.81 KG/M2 | DIASTOLIC BLOOD PRESSURE: 61 MMHG | SYSTOLIC BLOOD PRESSURE: 116 MMHG

## 2022-12-15 DIAGNOSIS — C43.4 MALIGNANT MELANOMA OF SCALP: Primary | ICD-10-CM

## 2022-12-15 PROCEDURE — 3074F SYST BP LT 130 MM HG: CPT | Mod: CPTII,S$GLB,, | Performed by: OTOLARYNGOLOGY

## 2022-12-15 PROCEDURE — 3078F PR MOST RECENT DIASTOLIC BLOOD PRESSURE < 80 MM HG: ICD-10-PCS | Mod: CPTII,S$GLB,, | Performed by: OTOLARYNGOLOGY

## 2022-12-15 PROCEDURE — 1159F PR MEDICATION LIST DOCUMENTED IN MEDICAL RECORD: ICD-10-PCS | Mod: CPTII,S$GLB,, | Performed by: OTOLARYNGOLOGY

## 2022-12-15 PROCEDURE — 99999 PR PBB SHADOW E&M-EST. PATIENT-LVL III: CPT | Mod: PBBFAC,,, | Performed by: OTOLARYNGOLOGY

## 2022-12-15 PROCEDURE — 1126F AMNT PAIN NOTED NONE PRSNT: CPT | Mod: CPTII,S$GLB,, | Performed by: OTOLARYNGOLOGY

## 2022-12-15 PROCEDURE — 99024 PR POST-OP FOLLOW-UP VISIT: ICD-10-PCS | Mod: S$GLB,,, | Performed by: OTOLARYNGOLOGY

## 2022-12-15 PROCEDURE — 1160F PR REVIEW ALL MEDS BY PRESCRIBER/CLIN PHARMACIST DOCUMENTED: ICD-10-PCS | Mod: CPTII,S$GLB,, | Performed by: OTOLARYNGOLOGY

## 2022-12-15 PROCEDURE — 3074F PR MOST RECENT SYSTOLIC BLOOD PRESSURE < 130 MM HG: ICD-10-PCS | Mod: CPTII,S$GLB,, | Performed by: OTOLARYNGOLOGY

## 2022-12-15 PROCEDURE — 1159F MED LIST DOCD IN RCRD: CPT | Mod: CPTII,S$GLB,, | Performed by: OTOLARYNGOLOGY

## 2022-12-15 PROCEDURE — 99999 PR PBB SHADOW E&M-EST. PATIENT-LVL III: ICD-10-PCS | Mod: PBBFAC,,, | Performed by: OTOLARYNGOLOGY

## 2022-12-15 PROCEDURE — 3078F DIAST BP <80 MM HG: CPT | Mod: CPTII,S$GLB,, | Performed by: OTOLARYNGOLOGY

## 2022-12-15 PROCEDURE — 99024 POSTOP FOLLOW-UP VISIT: CPT | Mod: S$GLB,,, | Performed by: OTOLARYNGOLOGY

## 2022-12-15 PROCEDURE — 1160F RVW MEDS BY RX/DR IN RCRD: CPT | Mod: CPTII,S$GLB,, | Performed by: OTOLARYNGOLOGY

## 2022-12-15 PROCEDURE — 1126F PR PAIN SEVERITY QUANTIFIED, NO PAIN PRESENT: ICD-10-PCS | Mod: CPTII,S$GLB,, | Performed by: OTOLARYNGOLOGY

## 2022-12-16 ENCOUNTER — TELEPHONE (OUTPATIENT)
Dept: HEMATOLOGY/ONCOLOGY | Facility: CLINIC | Age: 82
End: 2022-12-16
Payer: COMMERCIAL

## 2022-12-16 NOTE — NURSING
Referral for patient to see Dr. Hart. Appointment has been scheduled and patient and wife notified of appointment.     Oncology Navigation   Intake  Date of Diagnosis: 10/06/22  Cancer Type: Melanoma  Internal / External Referral: External  Date of Referral: 10/13/22  Initial Nurse Navigator Contact: 10/13/22  Referral to Initial Contact Timeline (days): 0  Date Worked: 12/15/22  First Appointment Available: 12/21/22  Appointment Date: 12/21/22  Schedule to Appointment Timeline (days): 4  First Available Date vs. Scheduled Date (days): 0  Reason if booked > 7 days after scheduling: Specific provider / access     Treatment  Current Status: Active    Surgical Oncologist: Dr. Jenna Radford  Type of Surgery: Scalp Excision  Consult Date: 10/17/22  Surgery Schedule Date: 12/08/22    Medical Oncologist: Dr. Real Hart       Procedures: Biopsy  Biopsy Schedule Date: 10/05/22             Support Systems: Spouse/significant other     Ac[employer]      Follow Up  No follow-ups on file.

## 2022-12-21 ENCOUNTER — PATIENT MESSAGE (OUTPATIENT)
Dept: INFUSION THERAPY | Facility: HOSPITAL | Age: 82
End: 2022-12-21
Payer: COMMERCIAL

## 2022-12-21 ENCOUNTER — CLINICAL SUPPORT (OUTPATIENT)
Dept: HEMATOLOGY/ONCOLOGY | Facility: CLINIC | Age: 82
End: 2022-12-21
Payer: COMMERCIAL

## 2022-12-21 ENCOUNTER — TELEPHONE (OUTPATIENT)
Dept: HEMATOLOGY/ONCOLOGY | Facility: CLINIC | Age: 82
End: 2022-12-21

## 2022-12-21 ENCOUNTER — OFFICE VISIT (OUTPATIENT)
Dept: HEMATOLOGY/ONCOLOGY | Facility: CLINIC | Age: 82
End: 2022-12-21
Payer: COMMERCIAL

## 2022-12-21 VITALS
OXYGEN SATURATION: 96 % | RESPIRATION RATE: 17 BRPM | HEIGHT: 73 IN | BODY MASS INDEX: 28.34 KG/M2 | TEMPERATURE: 98 F | DIASTOLIC BLOOD PRESSURE: 80 MMHG | SYSTOLIC BLOOD PRESSURE: 150 MMHG | HEART RATE: 55 BPM | WEIGHT: 213.88 LBS

## 2022-12-21 DIAGNOSIS — C43.4 MALIGNANT MELANOMA OF SCALP: Primary | ICD-10-CM

## 2022-12-21 DIAGNOSIS — I10 PRIMARY HYPERTENSION: ICD-10-CM

## 2022-12-21 DIAGNOSIS — I48.0 PAROXYSMAL ATRIAL FIBRILLATION: ICD-10-CM

## 2022-12-21 PROCEDURE — 99999 PR PBB SHADOW E&M-EST. PATIENT-LVL V: ICD-10-PCS | Mod: PBBFAC,,, | Performed by: INTERNAL MEDICINE

## 2022-12-21 PROCEDURE — 3077F PR MOST RECENT SYSTOLIC BLOOD PRESSURE >= 140 MM HG: ICD-10-PCS | Mod: CPTII,S$GLB,, | Performed by: INTERNAL MEDICINE

## 2022-12-21 PROCEDURE — 3288F FALL RISK ASSESSMENT DOCD: CPT | Mod: CPTII,S$GLB,, | Performed by: INTERNAL MEDICINE

## 2022-12-21 PROCEDURE — 99205 OFFICE O/P NEW HI 60 MIN: CPT | Mod: S$GLB,,, | Performed by: INTERNAL MEDICINE

## 2022-12-21 PROCEDURE — 99999 PR PBB SHADOW E&M-EST. PATIENT-LVL V: CPT | Mod: PBBFAC,,, | Performed by: INTERNAL MEDICINE

## 2022-12-21 PROCEDURE — 1101F PR PT FALLS ASSESS DOC 0-1 FALLS W/OUT INJ PAST YR: ICD-10-PCS | Mod: CPTII,S$GLB,, | Performed by: INTERNAL MEDICINE

## 2022-12-21 PROCEDURE — 1101F PT FALLS ASSESS-DOCD LE1/YR: CPT | Mod: CPTII,S$GLB,, | Performed by: INTERNAL MEDICINE

## 2022-12-21 PROCEDURE — 1159F MED LIST DOCD IN RCRD: CPT | Mod: CPTII,S$GLB,, | Performed by: INTERNAL MEDICINE

## 2022-12-21 PROCEDURE — 1159F PR MEDICATION LIST DOCUMENTED IN MEDICAL RECORD: ICD-10-PCS | Mod: CPTII,S$GLB,, | Performed by: INTERNAL MEDICINE

## 2022-12-21 PROCEDURE — 99999 PR PBB SHADOW E&M-EST. PATIENT-LVL I: CPT | Mod: PBBFAC,,,

## 2022-12-21 PROCEDURE — 1126F PR PAIN SEVERITY QUANTIFIED, NO PAIN PRESENT: ICD-10-PCS | Mod: CPTII,S$GLB,, | Performed by: INTERNAL MEDICINE

## 2022-12-21 PROCEDURE — 3288F PR FALLS RISK ASSESSMENT DOCUMENTED: ICD-10-PCS | Mod: CPTII,S$GLB,, | Performed by: INTERNAL MEDICINE

## 2022-12-21 PROCEDURE — 3079F DIAST BP 80-89 MM HG: CPT | Mod: CPTII,S$GLB,, | Performed by: INTERNAL MEDICINE

## 2022-12-21 PROCEDURE — 99999 PR PBB SHADOW E&M-EST. PATIENT-LVL I: ICD-10-PCS | Mod: PBBFAC,,,

## 2022-12-21 PROCEDURE — 1126F AMNT PAIN NOTED NONE PRSNT: CPT | Mod: CPTII,S$GLB,, | Performed by: INTERNAL MEDICINE

## 2022-12-21 PROCEDURE — 3077F SYST BP >= 140 MM HG: CPT | Mod: CPTII,S$GLB,, | Performed by: INTERNAL MEDICINE

## 2022-12-21 PROCEDURE — 3079F PR MOST RECENT DIASTOLIC BLOOD PRESSURE 80-89 MM HG: ICD-10-PCS | Mod: CPTII,S$GLB,, | Performed by: INTERNAL MEDICINE

## 2022-12-21 PROCEDURE — 99205 PR OFFICE/OUTPT VISIT, NEW, LEVL V, 60-74 MIN: ICD-10-PCS | Mod: S$GLB,,, | Performed by: INTERNAL MEDICINE

## 2022-12-21 NOTE — PLAN OF CARE
START ON PATHWAY REGIMEN - Melanoma and Other Skin Cancers    [id number]        Nivolumab (Opdivo)       Ipilimumab (Yervoy)       Nivolumab (Opdivo)     **Always confirm dose/schedule in your pharmacy ordering system**    Patient Characteristics:  Melanoma, Cutaneous/Unknown Primary, Preoperative or Nonsurgical Candidate   (Clinical Staging), Any cT, cN+, Unresectable, Systemic Therapy Indicated, BRAF   V600 Wild Type / BRAF V600 Results Pending or Unknown  Disease Classification: Melanoma  Disease Subtype: Cutaneous  BRAF V600 Mutation Status: Awaiting BRAF V600 Results  Therapeutic Status: Preoperative or Nonsurgical Candidate (Clinical Staging)  AJCC T Category: cT4a  AJCC N Category: cN1  AJCC M Category: cM0  AJCC 8 Stage Grouping: III  Type of Therapy: Systemic Therapy Indicated  Intent of Therapy:  Curative Intent, Discussed with Patient

## 2022-12-21 NOTE — PROGRESS NOTES
Pt s/p wide local excision melanoma scalp with delayed reconstruction on 12/8/22 by Dr Jenna Radford in today for wound check.  Wound to scalp free from redness, swelling or drainage.  Granulation tissue noted.  Pt denies pain/discomfort at site.  Wound care reviewed with pt and wife.  Aquaphor ointment applied to wound and covered with telfa dressing.  Upcoming post op appt with Dr Radford given to pt.

## 2022-12-21 NOTE — PROGRESS NOTES
PATIENT: Stevan Charles Jr.  MRN: [id number]  DATE: 12/21/2022      Diagnosis:   1. Malignant melanoma of scalp    2. Primary hypertension    3. Paroxysmal atrial fibrillation        Chief Complaint: Establish Care (Melanoma)      Oncologic History:      Oncologic History     Oncologic Treatment     Pathology           Subjective:    Initial History: Mr. Charles is a 82 y.o. male with HTN, a-fib who presents for melanoma.  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  MRI brain 11/01/2022 showed defect in the left paramedian posterior frontal parietal scalp with extension to the outer cortical margin the calvarium without obvious intraosseous or intracranial extension.  PET-CT on 11/02/2022 showed a markedly hypermetabolic cutaneous nodule at the skull vertex along the midline consistent with the patient's known melanoma measuring 2.9 x 2.3 x 1.5 cm; hypermetabolic nodule in the anterior aspect of the right deltoid was muscle measuring 1.8 x 1.3 cm; and hypermetabolic cutaneous nodule at the right posterior elbow measuring 3 x 1.6 cm.  Right anterior chest wall lesion was biopsied 11/16/2022 positive for foreign body giant cell reaction.  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  [id number] (microsatellite with no known regional lymph node disease).  Pt was discussed at tumor board with recommendation for Neoadjuvant Ipi/Nivo for 2 cycles prior to further resection.      Currently the patient is without complaints.  He continues to change the bandage over his scalp defect twice a day.  The patient denies CP, cough, SOB, abdominal pain, N/V, constipation, diarrhea.  The patient denies fever, chills, night sweats, weight loss, new lumps or bumps, easy bruising or bleeding.    Past Medical History:   Past  Medical History:   Diagnosis Date    Essential (primary) hypertension     Malignant melanoma of skin, unspecified     Paroxysmal atrial fibrillation        Past Surgical HIstory:   Past Surgical History:   Procedure Laterality Date    CARDIOVERSION N/A     EXCISION OF LESION N/A 2022    Procedure: EXCISION, LESION scalp-melanoma;  Surgeon: Jenna Radford MD;  Location: [facility];  Service: ENT;  Laterality: N/A;    EYE SURGERY Bilateral     cataracts    FOREIGN BODY REMOVAL Right     chest--piece of wooden board    SPLENECTOMY, TOTAL      TONSILLECTOMY      WRIST SURGERY Right        Family History:   Family History   Problem Relation Age of Onset    Stroke Mother 81         from stroke    Kidney disease Father 72        dialysis /    Breast cancer Sister        Social History:  reports that he has quit smoking. His smoking use included cigars. He has never used smokeless tobacco. He reports that he does not drink alcohol and does not use drugs.    Allergies:  Review of patient's allergies indicates:  No Known Allergies    Medications:  Current Outpatient Medications   Medication Sig Dispense Refill    acebutoloL (SECTRAL) 400 mg capsule Take 200 mg by mouth every evening.      acetaminophen (TYLENOL) 325 MG tablet Take 650 mg by mouth every 4 (four) hours as needed.      amiodarone (PACERONE) 200 MG Tab Take 200 mg by mouth after dinner.      valsartan-hydrochlorothiazide (DIOVAN-HCT) 80-12.5 mg per tablet Take 1 tablet by mouth after lunch.       No current facility-administered medications for this visit.       Review of Systems   Constitutional:  Negative for appetite change, chills, fever and unexpected weight change.   HENT:  Negative for mouth sores, sore throat and trouble swallowing.    Eyes:  Negative for photophobia and visual disturbance.   Respiratory:  Negative for cough, chest tightness and shortness of breath.    Cardiovascular:  Negative for chest pain, palpitations and leg swelling.  "  Gastrointestinal:  Negative for abdominal pain, constipation, diarrhea, nausea and vomiting.   Endocrine: Negative for cold intolerance and heat intolerance.   Genitourinary:  Negative for difficulty urinating, dysuria, frequency and hematuria.   Musculoskeletal:  Negative for arthralgias, back pain and myalgias.   Skin:  Negative for color change and rash.        Scalp defect   Neurological:  Negative for dizziness, light-headedness, numbness and headaches.   Hematological:  Negative for adenopathy. Does not bruise/bleed easily.   Psychiatric/Behavioral:  The patient is not nervous/anxious.      ECOG Performance Status: 0   Objective:      Vitals:   Vitals:    12/21/22 0756   BP: (!) 150/80   BP Location: Left arm   Patient Position: Sitting   BP Method: Large (Manual)   Pulse: (!) 55   Resp: 17   Temp: 97.6 °F (36.4 °C)   TempSrc: Temporal   SpO2: 96%   Weight: 97 kg (213 lb 13.5 oz)   Height: 6' 1" (1.854 m)       Physical Exam  Constitutional:       General: He is not in acute distress.     Appearance: He is well-developed. He is not diaphoretic.   HENT:      Head: Normocephalic and atraumatic.   Eyes:      General: No scleral icterus.        Right eye: No discharge.         Left eye: No discharge.   Cardiovascular:      Rate and Rhythm: Normal rate and regular rhythm.      Heart sounds: Normal heart sounds. No murmur heard.    No friction rub. No gallop.   Pulmonary:      Effort: Pulmonary effort is normal. No respiratory distress.      Breath sounds: Normal breath sounds. No wheezing or rales.   Chest:      Chest wall: No tenderness.   Abdominal:      General: Bowel sounds are normal. There is no distension.      Palpations: Abdomen is soft. There is no mass.      Tenderness: There is no abdominal tenderness. There is no rebound.   Musculoskeletal:         General: No tenderness. Normal range of motion.      Cervical back: Normal range of motion.   Lymphadenopathy:      Cervical: No cervical adenopathy.      " Upper Body:      Right upper body: No supraclavicular or axillary adenopathy.      Left upper body: No supraclavicular or axillary adenopathy.   Skin:     General: Skin is warm and dry.      Findings: No erythema or rash.      Comments: Baseball sized defect in vertex of scalp.   Neurological:      Mental Status: He is alert and oriented to person, place, and time.      Coordination: Coordination normal.   Psychiatric:         Behavior: Behavior normal.       Laboratory Data:  No visits with results within 1 Week(s) from this visit.   Latest known visit with results is:   Hospital Outpatient Visit on 12/02/2022   Component Date Value Ref Range Status    Sodium 12/02/2022 137  136 - 145 mmol/L Final    Potassium 12/02/2022 4.3  3.5 - 5.1 mmol/L Final    Chloride 12/02/2022 101  95 - 110 mmol/L Final    CO2 12/02/2022 33 (H)  22 - 31 mmol/L Final    Glucose 12/02/2022 99  70 - 110 mg/dL Final    Comment: The ADA recommends the following guidelines for fasting glucose:    Normal:       less than 100 mg/dL    Prediabetes:  100 mg/dL to 125 mg/dL    Diabetes:     126 mg/dL or higher      BUN 12/02/2022 15  9 - 21 mg/dL Final    Creatinine 12/02/2022 1.24  0.50 - 1.40 mg/dL Final    Calcium 12/02/2022 9.1  8.4 - 10.2 mg/dL Final    Anion Gap 12/02/2022 3 (L)  8 - 16 mmol/L Final    eGFR 12/02/2022 58 (A)  >60 mL/min/1.73 m^2 Final    WBC 12/02/2022 4.10  3.90 - 12.70 K/uL Final    RBC 12/02/2022 4.41 (L)  4.60 - 6.20 M/uL Final    Hemoglobin 12/02/2022 15.0  14.0 - 18.0 g/dL Final    Hematocrit 12/02/2022 44.7  40.0 - 54.0 % Final    MCV 12/02/2022 101 (H)  82 - 98 fL Final    MCH 12/02/2022 34.0 (H)  27.0 - 31.0 pg Final    MCHC 12/02/2022 33.6  32.0 - 36.0 g/dL Final    RDW 12/02/2022 13.6  11.5 - 14.5 % Final    Platelets 12/02/2022 197  150 - 450 K/uL Final    MPV 12/02/2022 11.7  9.2 - 12.9 fL Final         Imaging: MRI brain 11/01/22    Intracranial compartment:     There is no acute intracranial abnormality.  There  is mild-to-moderate generalized cerebral and only mild cerebellar volume loss.  Also, there is only a mild burden of nonspecific white matter change.  There is no intracranial hemorrhage.  There is no parenchymal mass or mass effect.  There are no regions of restricted diffusion to suggest acute infarction.  There is no abnormal extra-axial fluid collection.  There is no abnormal enhancement in the brain or its covering.  The basilar cisterns are open.  Flow voids indicating patency are present in the major vessels at the base of the brain.  The cerebellar tonsils are normal position.  Sellar structures are normal.     Skull/extracranial contents:     There is prominent soft tissue surrounding the odontoid tip which may represent pannus formation related to possible osteoarthritis or rheumatoid arthritis. This is nonspecific.  There is a defect in the left paramedian posterior frontal parietal scalp which extends to the outer cortical margin of the calvarium without obvious intra osseous or intracranial extension.  This scalp soft tissue defect restricts diffusion and enhances heterogeneously and likely represents the site of melanoma as provided in the clinical history.    PET/CT 11/02/22    Head/neck:     There is a markedly hypermetabolic cutaneous nodule at the skull vertex along the midline consistent with the patient's known melanoma.  This is best visualized and measured on the fused PET-CT coronal and sagittal images and measures 2.9 x 2.3 x 1.5 cm with a max SUV of 13.9.  No lymphadenopathy is present.     Chest:     There is a hypermetabolic nodule within the anterior aspect of the right anterior deltoid muscle highly suspicious for metastatic disease.  This cannot be well visualized on the CT images and is best visualized on the PET-CT fused images.  On image 134 of the PET-CT fused axials, the nodule measures 1.8 x 1.2 cm and has a max SUV of 9.4.  No other significant abnormal hypermetabolic foci are  identified within the chest.  No hypermetabolic pulmonary nodules, lymphadenopathy, pleural effusion, or pericardial effusion are present.     Abdomen/pelvis:     No significant abnormal hypermetabolic foci are identified within the abdomen and pelvis.  No lymphadenopathy is present.  The adrenal glands are normal.     Skeleton:     No significant abnormal hypermetabolic foci are identified within the skeleton.  There are no findings to suggest osseous metastatic disease.     Upper/lower extremities:     There is a hypermetabolic cutaneous nodule at the right posterior elbow suspicious for a skin metastasis.  This is best visualized on the PET-CT fused coronal images and on image 134 measures 3.0 x 1.6 cm with a max SUV of 11.0.   Assessment:       1. Malignant melanoma of scalp    2. Primary hypertension    3. Paroxysmal atrial fibrillation           Plan:     Melanoma - pt with stage III melanoma of the vertex of the scalp pT4a pN1c (microsatellite with no known regional lymph node disease?  -Plan is to initiate treatment with FLIP dose Ipi/Nivo for 2 cycles followed by re-resection  -Pt would then need two additional cycle of Ipi/Nivo with year of Nivolumab  -Pt to attend chemo school  -Will start pt as soon as approved and chemo school completed     HTN - pt on valsartan-HCT, and acebutolol  -Stable  -Will monitor    A-fib - pt in NSR  -Controlled  -Pt on amiodarone and acebutolol  -Cardiology managing    Route Chart for Scheduling    Med Onc Chart Routing      Follow up with physician Other. The patient needs approval for immunotherapy.  He will need to attaend chemo school.  The patient will need a CBC, CMP and TSH with a clinic appt the day before his first treatment with treatmetn the day after.   Follow up with AUGUSTINE    Infusion scheduling note    Injection scheduling note    Labs    Imaging    Pharmacy appointment    Other referrals        Treatment Plan Information   OP NIVOLUMAB 3MG/KG IPILIMUMAB 1MG/KG  FOLLOWED BY NIVOLUMAB 480MG Q4W   Real Hart MD   Upcoming Treatment Dates - OP NIVOLUMAB 3MG/KG IPILIMUMAB 1MG/KG FOLLOWED BY NIVOLUMAB 480MG Q4W    1/4/2023       Chemotherapy       nivolumab 291 mg in sodium chloride 0.9% 129.1 mL infusion       ipilimumab (YERVOY) 1 mg/kg = 97 mg in sodium chloride 0.9% 119.4 mL chemo infusion  1/25/2023       Chemotherapy       nivolumab 291 mg in sodium chloride 0.9% 129.1 mL infusion       ipilimumab (YERVOY) 1 mg/kg = 97 mg in sodium chloride 0.9% 119.4 mL chemo infusion  2/15/2023       Chemotherapy       nivolumab 291 mg in sodium chloride 0.9% 129.1 mL infusion       ipilimumab (YERVOY) 1 mg/kg = 97 mg in sodium chloride 0.9% 119.4 mL chemo infusion  3/8/2023       Chemotherapy       nivolumab 291 mg in sodium chloride 0.9% 129.1 mL infusion       ipilimumab (YERVOY) 1 mg/kg = 97 mg in sodium chloride 0.9% 119.4 mL chemo infusion      Real Hart MD  [facility]  Hematology and Oncology  [facility]   [address] [place] [address]   O: [phone]  F: [phone]

## 2022-12-22 ENCOUNTER — TELEPHONE (OUTPATIENT)
Dept: HEMATOLOGY/ONCOLOGY | Facility: CLINIC | Age: 82
End: 2022-12-22
Payer: COMMERCIAL

## 2022-12-22 DIAGNOSIS — C43.4 MALIGNANT MELANOMA OF SCALP: Primary | ICD-10-CM

## 2022-12-22 NOTE — NURSING
Plan to Start treatment with Opdivo/Rebeka. SPoke with wife and reviewed all upcoming appointments with her. She verbalized understanding

## 2022-12-22 NOTE — TELEPHONE ENCOUNTER
Called and gave pt's wife pt's surgery, PAT and post op locations, dates and times.  Reviewed pre op teaching.  All questions answered and reinforced for pt and wife to call for any additional needs.

## 2023-01-03 NOTE — PROGRESS NOTES
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  Patient presented about 3-4 months ago scalp. Tneder to touch. NO bleeding. No pruritis. Applying triple antibiotic S/P biopsy    Patient denies numbness and weakness of his face.  He denies parotid and neck masses.    He has no previous history of melanoma or non melanoma skin cancer and no family history of melanoma.  Hx of sunburns as a child/young adult.    11/3/2022  Patient is here today follow-up biopsy results and imaging studies.  He has no new complaints.    11/28/2022  Patient is here today for biopsy results of a possible metastasis and to discuss treatment planning.  FNA was negative for metastatic lesion.  Patient has no new complaints.    12/15/2022  Patient is here today for path and bolster removal.  He has no complaints.  He has had very minimal pain    1/5/2023  Patient is here today for wound check.  He is status post wide local excision melanoma of the scalp was sentinel lymph node biopsy.  Patient has positive margins and a satellite lesion.  After his case was presented at multidisciplinary head & neck tumor board it was decided to treat the patient with neoadjuvant immunotherapy followed by resection followed by a year of immunotherapy.    Patient has no complaints    ROS: see HPI  Constitutional: Negative for activity change and appetite change, weight loss.   Eyes: Negative for discharge, visual changes.   Respiratory: Negative for difficulty breathing and wheezing   Cardiovascular: Negative for chest pain.   Gastrointestinal: Negative for abdominal distention and abdominal pain.   Endocrine: Negative for cold intolerance and heat intolerance.    Genitourinary: Negative for dysuria.   Musculoskeletal: Negative for gait problem, muscle pain and joint swelling.   Skin: Negative for color change and pallor; negative for skin lesions.   Neurological: Negative for syncope and weakness; no numbness face.   Psychiatric/Behavioral: Negative for agitation and confusion; negative for depression.    Physical Exam:      Constitutional  General Appearance: well nourished, well-developed, alert, oriented, in no acute distress  Communication: ability, understanding, normal  Head and Face  Inspection: normocephalic, defect is clean with granulation tissue around edges  Palpation: no stepoffs, sinus tenderness or masses  Parotid glands: no masses, stones, swelling or tenderness  Neurological  Cranial Nerves: grossly intact  General: no focal deficits    Excision  SKIN, VERTEX SCALP, WIDE LOCAL EXCISION   - SPINDLE CELL MELANOMA   - PRESENT AT DEEP MARGIN   - MICROSATELLITE PRESET AT 12-3 O'CLOCK PERIPHERAL MARGIN     Comment:   Immunohistochemistry was performed with appropriate controls on block  1D based on the histopathologic findings and the results are  summarized as follows:   MelanA: Highlights melanocytes   Discussed with Dr. Radford on 12/13/22.     BRAF mutation testing has been ordered and the results will be reported    separately.     MELANOMA: SYNOPTIC REPORT   PROCEDURE:   Wide local excision   SPECIMEN LATERALITY: Vertex scalp   TUMOR SITE: Vertex scalp   GROSS TUMOR SIZE: 17 mm   MACROSCOPIC SATELLITE NODULE(S): Not identified   HISTOLOGIC TYPE: Spindle cell melanoma   SURGICAL MARGIN STATUS:   Invasive melanoma present at deep margin and 12-3:00 margin.   Margins negative for melanoma in-situ (2 mm to 3-6:00 margin)   MEASURED BRESLOW THICKNESS: 10.5 mm   DERMAL MITOTIC RATE (PER SQUARE MM): 12 mitoses per square mm   ULCERATION: Negative   GROWTH PHASE: Vertical   LEVEL  OF   INVASION (KEITH'S): Keith level V   MICROSATELLITOSIS: Present   NEUROTROPISM:  Present   LYMPHOVASCULAR INVASION: Negative   TUMOR INFILTRATING LYMPHOCYTES: Present, non-brisk   TUMOR REGRESSION Negative   ASSOCIATED NEVUS Negative   IMMUNOHISTOCHEMICAL STUDIES: HMB45   IN-TRANSIT METASTASIS Negative   ADDITIONAL FINDINGS:   Not applicable   TNM CODE: pT4a pN1c (microsatellite with no known regional lymph node disease)     Records reviewed: Med Onc Real Hart MD  Assessment:       1. Malignant melanoma of scalp    2. Primary hypertension    3. Paroxysmal atrial fibrillation          Plan:      Melanoma - pt with stage III melanoma of the vertex of the scalp pT4a pN1c (microsatellite with no known regional lymph node disease?  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

## 2023-01-05 ENCOUNTER — DOCUMENTATION ONLY (OUTPATIENT)
Dept: PHARMACY | Facility: AMBULARY SURGERY CENTER | Age: 83
End: 2023-01-05
Payer: COMMERCIAL

## 2023-01-05 ENCOUNTER — OFFICE VISIT (OUTPATIENT)
Dept: HEMATOLOGY/ONCOLOGY | Facility: CLINIC | Age: 83
End: 2023-01-05
Payer: COMMERCIAL

## 2023-01-05 ENCOUNTER — CLINICAL SUPPORT (OUTPATIENT)
Dept: HEMATOLOGY/ONCOLOGY | Facility: CLINIC | Age: 83
End: 2023-01-05
Payer: COMMERCIAL

## 2023-01-05 ENCOUNTER — LAB VISIT (OUTPATIENT)
Dept: LAB | Facility: HOSPITAL | Age: 83
End: 2023-01-05
Attending: INTERNAL MEDICINE
Payer: COMMERCIAL

## 2023-01-05 VITALS
SYSTOLIC BLOOD PRESSURE: 138 MMHG | TEMPERATURE: 98 F | RESPIRATION RATE: 18 BRPM | BODY MASS INDEX: 27.43 KG/M2 | OXYGEN SATURATION: 99 % | DIASTOLIC BLOOD PRESSURE: 80 MMHG | WEIGHT: 207 LBS | HEIGHT: 73 IN | HEART RATE: 55 BPM

## 2023-01-05 VITALS
HEIGHT: 73 IN | SYSTOLIC BLOOD PRESSURE: 138 MMHG | OXYGEN SATURATION: 99 % | WEIGHT: 207.13 LBS | BODY MASS INDEX: 27.45 KG/M2 | TEMPERATURE: 98 F | HEART RATE: 55 BPM | DIASTOLIC BLOOD PRESSURE: 80 MMHG

## 2023-01-05 DIAGNOSIS — S01.00XA OPEN WOUND OF SCALP, UNSPECIFIED OPEN WOUND TYPE, INITIAL ENCOUNTER: ICD-10-CM

## 2023-01-05 DIAGNOSIS — C43.4 MALIGNANT MELANOMA OF SCALP: ICD-10-CM

## 2023-01-05 DIAGNOSIS — C43.4 MALIGNANT MELANOMA OF SCALP: Primary | ICD-10-CM

## 2023-01-05 LAB
ALBUMIN SERPL BCP-MCNC: 3.8 G/DL (ref 3.5–5.2)
ALP SERPL-CCNC: 92 U/L (ref 55–135)
ALT SERPL W/O P-5'-P-CCNC: 19 U/L (ref 10–44)
ANION GAP SERPL CALC-SCNC: 12 MMOL/L (ref 8–16)
AST SERPL-CCNC: 25 U/L (ref 10–40)
BASOPHILS # BLD AUTO: 0.05 K/UL (ref 0–0.2)
BASOPHILS NFR BLD: 1.1 % (ref 0–1.9)
BILIRUB SERPL-MCNC: 1 MG/DL (ref 0.1–1)
BUN SERPL-MCNC: 16 MG/DL (ref 8–23)
CALCIUM SERPL-MCNC: 9.5 MG/DL (ref 8.7–10.5)
CHLORIDE SERPL-SCNC: 101 MMOL/L (ref 95–110)
CO2 SERPL-SCNC: 25 MMOL/L (ref 23–29)
CREAT SERPL-MCNC: 1.4 MG/DL (ref 0.5–1.4)
DIFFERENTIAL METHOD: ABNORMAL
EOSINOPHIL # BLD AUTO: 0.2 K/UL (ref 0–0.5)
EOSINOPHIL NFR BLD: 3.4 % (ref 0–8)
ERYTHROCYTE [DISTWIDTH] IN BLOOD BY AUTOMATED COUNT: 13.3 % (ref 11.5–14.5)
EST. GFR  (NO RACE VARIABLE): 50.5 ML/MIN/1.73 M^2
GLUCOSE SERPL-MCNC: 98 MG/DL (ref 70–110)
HCT VFR BLD AUTO: 45.6 % (ref 40–54)
HGB BLD-MCNC: 15.4 G/DL (ref 14–18)
IMM GRANULOCYTES # BLD AUTO: 0.02 K/UL (ref 0–0.04)
IMM GRANULOCYTES NFR BLD AUTO: 0.4 % (ref 0–0.5)
LYMPHOCYTES # BLD AUTO: 1.1 K/UL (ref 1–4.8)
LYMPHOCYTES NFR BLD: 22.6 % (ref 18–48)
MCH RBC QN AUTO: 33.5 PG (ref 27–31)
MCHC RBC AUTO-ENTMCNC: 33.8 G/DL (ref 32–36)
MCV RBC AUTO: 99 FL (ref 82–98)
MONOCYTES # BLD AUTO: 0.5 K/UL (ref 0.3–1)
MONOCYTES NFR BLD: 10.4 % (ref 4–15)
NEUTROPHILS # BLD AUTO: 2.9 K/UL (ref 1.8–7.7)
NEUTROPHILS NFR BLD: 62.1 % (ref 38–73)
NRBC BLD-RTO: 0 /100 WBC
PLATELET # BLD AUTO: 193 K/UL (ref 150–450)
PMV BLD AUTO: 10.8 FL (ref 9.2–12.9)
POTASSIUM SERPL-SCNC: 4 MMOL/L (ref 3.5–5.1)
PROT SERPL-MCNC: 6.6 G/DL (ref 6–8.4)
RBC # BLD AUTO: 4.6 M/UL (ref 4.6–6.2)
SODIUM SERPL-SCNC: 138 MMOL/L (ref 136–145)
TSH SERPL DL<=0.005 MIU/L-ACNC: 1.87 UIU/ML (ref 0.4–4)
WBC # BLD AUTO: 4.73 K/UL (ref 3.9–12.7)

## 2023-01-05 PROCEDURE — 99999 PR PBB SHADOW E&M-EST. PATIENT-LVL V: CPT | Mod: PBBFAC,,, | Performed by: NURSE PRACTITIONER

## 2023-01-05 PROCEDURE — 3288F PR FALLS RISK ASSESSMENT DOCUMENTED: ICD-10-PCS | Mod: CPTII,S$GLB,, | Performed by: OTOLARYNGOLOGY

## 2023-01-05 PROCEDURE — 99215 OFFICE O/P EST HI 40 MIN: CPT | Mod: S$GLB,,, | Performed by: NURSE PRACTITIONER

## 2023-01-05 PROCEDURE — 85025 COMPLETE CBC W/AUTO DIFF WBC: CPT | Mod: PN | Performed by: INTERNAL MEDICINE

## 2023-01-05 PROCEDURE — 84443 ASSAY THYROID STIM HORMONE: CPT | Performed by: INTERNAL MEDICINE

## 2023-01-05 PROCEDURE — 36415 COLL VENOUS BLD VENIPUNCTURE: CPT | Mod: PN | Performed by: INTERNAL MEDICINE

## 2023-01-05 PROCEDURE — 3075F SYST BP GE 130 - 139MM HG: CPT | Mod: CPTII,S$GLB,, | Performed by: OTOLARYNGOLOGY

## 2023-01-05 PROCEDURE — 99999 PR PBB SHADOW E&M-EST. PATIENT-LVL V: ICD-10-PCS | Mod: PBBFAC,,, | Performed by: NURSE PRACTITIONER

## 2023-01-05 PROCEDURE — 3075F PR MOST RECENT SYSTOLIC BLOOD PRESS GE 130-139MM HG: ICD-10-PCS | Mod: CPTII,S$GLB,, | Performed by: OTOLARYNGOLOGY

## 2023-01-05 PROCEDURE — 99024 POSTOP FOLLOW-UP VISIT: CPT | Mod: S$GLB,,, | Performed by: OTOLARYNGOLOGY

## 2023-01-05 PROCEDURE — 99215 PR OFFICE/OUTPT VISIT, EST, LEVL V, 40-54 MIN: ICD-10-PCS | Mod: S$GLB,,, | Performed by: NURSE PRACTITIONER

## 2023-01-05 PROCEDURE — 99999 PR PBB SHADOW E&M-EST. PATIENT-LVL III: CPT | Mod: PBBFAC,,, | Performed by: OTOLARYNGOLOGY

## 2023-01-05 PROCEDURE — 3288F FALL RISK ASSESSMENT DOCD: CPT | Mod: CPTII,S$GLB,, | Performed by: OTOLARYNGOLOGY

## 2023-01-05 PROCEDURE — 1126F AMNT PAIN NOTED NONE PRSNT: CPT | Mod: CPTII,S$GLB,, | Performed by: OTOLARYNGOLOGY

## 2023-01-05 PROCEDURE — 99999 PR PBB SHADOW E&M-EST. PATIENT-LVL III: ICD-10-PCS | Mod: PBBFAC,,, | Performed by: OTOLARYNGOLOGY

## 2023-01-05 PROCEDURE — 80053 COMPREHEN METABOLIC PANEL: CPT | Mod: PN | Performed by: INTERNAL MEDICINE

## 2023-01-05 PROCEDURE — 1101F PT FALLS ASSESS-DOCD LE1/YR: CPT | Mod: CPTII,S$GLB,, | Performed by: OTOLARYNGOLOGY

## 2023-01-05 PROCEDURE — 99024 PR POST-OP FOLLOW-UP VISIT: ICD-10-PCS | Mod: S$GLB,,, | Performed by: OTOLARYNGOLOGY

## 2023-01-05 PROCEDURE — 1101F PR PT FALLS ASSESS DOC 0-1 FALLS W/OUT INJ PAST YR: ICD-10-PCS | Mod: CPTII,S$GLB,, | Performed by: OTOLARYNGOLOGY

## 2023-01-05 PROCEDURE — 3079F PR MOST RECENT DIASTOLIC BLOOD PRESSURE 80-89 MM HG: ICD-10-PCS | Mod: CPTII,S$GLB,, | Performed by: OTOLARYNGOLOGY

## 2023-01-05 PROCEDURE — 3079F DIAST BP 80-89 MM HG: CPT | Mod: CPTII,S$GLB,, | Performed by: OTOLARYNGOLOGY

## 2023-01-05 PROCEDURE — 1126F PR PAIN SEVERITY QUANTIFIED, NO PAIN PRESENT: ICD-10-PCS | Mod: CPTII,S$GLB,, | Performed by: OTOLARYNGOLOGY

## 2023-01-05 RX ORDER — ONDANSETRON 8 MG/1
8 TABLET, ORALLY DISINTEGRATING ORAL EVERY 8 HOURS PRN
Qty: 40 TABLET | Refills: 3 | Status: SHIPPED | OUTPATIENT
Start: 2023-01-05 | End: 2024-01-05

## 2023-01-05 RX ORDER — PROMETHAZINE HYDROCHLORIDE 12.5 MG/1
TABLET ORAL
Qty: 40 TABLET | Refills: 3 | Status: SHIPPED | OUTPATIENT
Start: 2023-01-05 | End: 2024-04-01

## 2023-01-05 NOTE — PROGRESS NOTES
PATIENT: Stevan Charles Jr.  MRN: [id number]  DATE: 1/6/2023      Diagnosis:   1. Malignant melanoma of scalp    2. Primary hypertension    3. Paroxysmal atrial fibrillation          Chief Complaint: Melanoma (Malignant melanoma of scalp; new treatment clearance )      Oncologic History:      Oncologic History     Oncologic Treatment     Pathology           Subjective:    Interval History:  Mr. Charles is a 82 y.o. male with HTN, a-fib who presents for melanoma.  Currently the patient states he feels well.  The patient denies CP, cough, SOB, abdominal pain, N/V, constipation, diarrhea.  The patient denies fever, chills, night sweats, weight loss, new lumps or bumps, easy bruising or bleeding.      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  MRI brain 11/01/2022 showed defect in the left paramedian posterior frontal parietal scalp with extension to the outer cortical margin the calvarium without obvious intraosseous or intracranial extension.  PET-CT on 11/02/2022 showed a markedly hypermetabolic cutaneous nodule at the skull vertex along the midline consistent with the patient's known melanoma measuring 2.9 x 2.3 x 1.5 cm; hypermetabolic nodule in the anterior aspect of the right deltoid was muscle measuring 1.8 x 1.3 cm; and hypermetabolic cutaneous nodule at the right posterior elbow measuring 3 x 1.6 cm.  Right anterior chest wall lesion was biopsied 11/16/2022 positive for foreign body giant cell reaction.  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  [id number] (microsatellite with no known regional lymph node disease).  Pt was discussed at tumor board with recommendation for Neoadjuvant Ipi/Nivo for 2 cycles prior to further resection.    Past Medical History:   Past Medical History:    Diagnosis Date    Essential (primary) hypertension     Malignant melanoma of skin, unspecified     Paroxysmal atrial fibrillation        Past Surgical HIstory:   Past Surgical History:   Procedure Laterality Date    CARDIOVERSION N/A     EXCISION OF LESION N/A 2022    Procedure: EXCISION, LESION scalp-melanoma;  Surgeon: Jenna Radford MD;  Location: [facility];  Service: ENT;  Laterality: N/A;    EYE SURGERY Bilateral 2019    cataracts    FOREIGN BODY REMOVAL Right     chest--piece of wooden board    SPLENECTOMY, TOTAL      TONSILLECTOMY      WRIST SURGERY Right        Family History:   Family History   Problem Relation Age of Onset    Stroke Mother 81         from stroke    Kidney disease Father 72        dialysis /    Breast cancer Sister        Social History:  reports that he has quit smoking. His smoking use included cigars. He has never used smokeless tobacco. He reports that he does not drink alcohol and does not use drugs.    Allergies:  Review of patient's allergies indicates:  No Known Allergies    Medications:  Current Outpatient Medications   Medication Sig Dispense Refill    acebutoloL (SECTRAL) 400 mg capsule Take 200 mg by mouth every evening.      acetaminophen (TYLENOL) 325 MG tablet Take 650 mg by mouth every 4 (four) hours as needed.      amiodarone (PACERONE) 200 MG Tab Take 200 mg by mouth after dinner.      ondansetron (ZOFRAN-ODT) 8 MG TbDL Take 1 tablet (8 mg total) by mouth every 8 (eight) hours as needed (nausea). 40 tablet 3    promethazine (PHENERGAN) 12.5 MG Tab (Take 1-2 tabs every 6 hours as needed for nausea persistent despite zofran) 40 tablet 3    valsartan-hydrochlorothiazide (DIOVAN-HCT) 80-12.5 mg per tablet Take 1 tablet by mouth after lunch.       No current facility-administered medications for this visit.       Review of Systems   Constitutional:  Negative for chills, diaphoresis, fatigue, fever and unexpected weight change.   HENT:          Scalp defect  "  Respiratory:  Negative for cough and shortness of breath.    Cardiovascular:  Negative for chest pain and palpitations.   Gastrointestinal:  Negative for abdominal pain, constipation, diarrhea, nausea and vomiting.   Skin:  Negative for color change and rash.   Neurological:  Negative for headaches.   Hematological:  Negative for adenopathy. Does not bruise/bleed easily.     ECOG Performance Status: 0   Objective:      Vitals:   Vitals:    01/06/23 1035   BP: (!) 116/58   BP Location: Right arm   Patient Position: Sitting   BP Method: Medium (Manual)   Pulse: (!) 55   Temp: 97.6 °F (36.4 °C)   TempSrc: Temporal   SpO2: 96%   Weight: 93.3 kg (205 lb 11 oz)   Height: 6' 1" (1.854 m)         Physical Exam  Constitutional:       General: He is not in acute distress.     Appearance: He is well-developed. He is not diaphoretic.   HENT:      Head: Normocephalic and atraumatic.   Cardiovascular:      Rate and Rhythm: Normal rate and regular rhythm.      Heart sounds: Normal heart sounds. No murmur heard.    No friction rub. No gallop.   Pulmonary:      Effort: Pulmonary effort is normal. No respiratory distress.      Breath sounds: Normal breath sounds. No wheezing or rales.   Chest:      Chest wall: No tenderness.   Abdominal:      General: Bowel sounds are normal. There is no distension.      Palpations: Abdomen is soft. There is no mass.      Tenderness: There is no abdominal tenderness. There is no rebound.   Lymphadenopathy:      Cervical: No cervical adenopathy.      Upper Body:      Right upper body: No supraclavicular or axillary adenopathy.      Left upper body: No supraclavicular or axillary adenopathy.   Skin:     General: Skin is warm and dry.      Findings: No erythema or rash.      Comments: Baseball sized defect in vertex of scalp.   Neurological:      Mental Status: He is alert and oriented to person, place, and time.      Coordination: Coordination normal.   Psychiatric:         Behavior: Behavior normal. "       Laboratory Data:  Lab Visit on 01/05/2023   Component Date Value Ref Range Status    WBC 01/05/2023 4.73  3.90 - 12.70 K/uL Final    RBC 01/05/2023 4.60  4.60 - 6.20 M/uL Final    Hemoglobin 01/05/2023 15.4  14.0 - 18.0 g/dL Final    Hematocrit 01/05/2023 45.6  40.0 - 54.0 % Final    MCV 01/05/2023 99 (H)  82 - 98 fL Final    MCH 01/05/2023 33.5 (H)  27.0 - 31.0 pg Final    MCHC 01/05/2023 33.8  32.0 - 36.0 g/dL Final    RDW 01/05/2023 13.3  11.5 - 14.5 % Final    Platelets 01/05/2023 193  150 - 450 K/uL Final    MPV 01/05/2023 10.8  9.2 - 12.9 fL Final    Immature Granulocytes 01/05/2023 0.4  0.0 - 0.5 % Final    Gran # (ANC) 01/05/2023 2.9  1.8 - 7.7 K/uL Final    Immature Grans (Abs) 01/05/2023 0.02  0.00 - 0.04 K/uL Final    Comment: Mild elevation in immature granulocytes is non specific and   can be seen in a variety of conditions including stress response,   acute inflammation, trauma and pregnancy. Correlation with other   laboratory and clinical findings is essential.      Lymph # 01/05/2023 1.1  1.0 - 4.8 K/uL Final    Mono # 01/05/2023 0.5  0.3 - 1.0 K/uL Final    Eos # 01/05/2023 0.2  0.0 - 0.5 K/uL Final    Baso # 01/05/2023 0.05  0.00 - 0.20 K/uL Final    nRBC 01/05/2023 0  0 /100 WBC Final    Gran % 01/05/2023 62.1  38.0 - 73.0 % Final    Lymph % 01/05/2023 22.6  18.0 - 48.0 % Final    Mono % 01/05/2023 10.4  4.0 - 15.0 % Final    Eosinophil % 01/05/2023 3.4  0.0 - 8.0 % Final    Basophil % 01/05/2023 1.1  0.0 - 1.9 % Final    Differential Method 01/05/2023 Automated   Final    Sodium 01/05/2023 138  136 - 145 mmol/L Final    Potassium 01/05/2023 4.0  3.5 - 5.1 mmol/L Final    Chloride 01/05/2023 101  95 - 110 mmol/L Final    CO2 01/05/2023 25  23 - 29 mmol/L Final    Glucose 01/05/2023 98  70 - 110 mg/dL Final    BUN 01/05/2023 16  8 - 23 mg/dL Final    Creatinine 01/05/2023 1.4  0.5 - 1.4 mg/dL Final    Calcium 01/05/2023 9.5  8.7 - 10.5 mg/dL Final    Total Protein 01/05/2023 6.6  6.0 - 8.4  g/dL Final    Albumin 01/05/2023 3.8  3.5 - 5.2 g/dL Final    Total Bilirubin 01/05/2023 1.0  0.1 - 1.0 mg/dL Final    Comment: For infants and newborns, interpretation of results should be based  on gestational age, weight and in agreement with clinical  observations.    Premature Infant recommended reference ranges:  Up to 24 hours.............<8.0 mg/dL  Up to 48 hours............<12.0 mg/dL  3-5 days..................<15.0 mg/dL  6-29 days.................<15.0 mg/dL      Alkaline Phosphatase 01/05/2023 92  55 - 135 U/L Final    AST 01/05/2023 25  10 - 40 U/L Final    ALT 01/05/2023 19  10 - 44 U/L Final    Anion Gap 01/05/2023 12  8 - 16 mmol/L Final    eGFR 01/05/2023 50.5 (A)  >60 mL/min/1.73 m^2 Final    TSH 01/05/2023 1.874  0.400 - 4.000 uIU/mL Final         Imaging: MRI brain 11/01/22    Intracranial compartment:     There is no acute intracranial abnormality.  There is mild-to-moderate generalized cerebral and only mild cerebellar volume loss.  Also, there is only a mild burden of nonspecific white matter change.  There is no intracranial hemorrhage.  There is no parenchymal mass or mass effect.  There are no regions of restricted diffusion to suggest acute infarction.  There is no abnormal extra-axial fluid collection.  There is no abnormal enhancement in the brain or its covering.  The basilar cisterns are open.  Flow voids indicating patency are present in the major vessels at the base of the brain.  The cerebellar tonsils are normal position.  Sellar structures are normal.     Skull/extracranial contents:     There is prominent soft tissue surrounding the odontoid tip which may represent pannus formation related to possible osteoarthritis or rheumatoid arthritis. This is nonspecific.  There is a defect in the left paramedian posterior frontal parietal scalp which extends to the outer cortical margin of the calvarium without obvious intra osseous or intracranial extension.  This scalp soft tissue  defect restricts diffusion and enhances heterogeneously and likely represents the site of melanoma as provided in the clinical history.    PET/CT 11/02/22    Head/neck:     There is a markedly hypermetabolic cutaneous nodule at the skull vertex along the midline consistent with the patient's known melanoma.  This is best visualized and measured on the fused PET-CT coronal and sagittal images and measures 2.9 x 2.3 x 1.5 cm with a max SUV of 13.9.  No lymphadenopathy is present.     Chest:     There is a hypermetabolic nodule within the anterior aspect of the right anterior deltoid muscle highly suspicious for metastatic disease.  This cannot be well visualized on the CT images and is best visualized on the PET-CT fused images.  On image 134 of the PET-CT fused axials, the nodule measures 1.8 x 1.2 cm and has a max SUV of 9.4.  No other significant abnormal hypermetabolic foci are identified within the chest.  No hypermetabolic pulmonary nodules, lymphadenopathy, pleural effusion, or pericardial effusion are present.     Abdomen/pelvis:     No significant abnormal hypermetabolic foci are identified within the abdomen and pelvis.  No lymphadenopathy is present.  The adrenal glands are normal.     Skeleton:     No significant abnormal hypermetabolic foci are identified within the skeleton.  There are no findings to suggest osseous metastatic disease.     Upper/lower extremities:     There is a hypermetabolic cutaneous nodule at the right posterior elbow suspicious for a skin metastasis.  This is best visualized on the PET-CT fused coronal images and on image 134 measures 3.0 x 1.6 cm with a max SUV of 11.0.   Assessment:       1. Malignant melanoma of scalp    2. Primary hypertension    3. Paroxysmal atrial fibrillation             Plan:     Melanoma - pt with stage III melanoma of the vertex of the scalp pT4a pN1c (microsatellite with no known regional lymph node disease  -Plan is to initiate treatment with FLIP dose  Ipi/Nivo for 2 cycles followed by re-resection  -Pt would then need to continue single agent Nivolumab for a year  -PT consented for immunotherapy on 1/05/23  -PT to return in 3 weeks with labs and cycle 2  -Pt to undergo surgery on 2/16/23 under the care of Dr Radford    HTN - pt on valsartan-HCT, and acebutolol  -Stable  -Will monitor    A-fib - pt in NSR  -Controlled  -Pt on amiodarone and acebutolol  -Cardiology managing    Route Chart for Scheduling    Med Onc Chart Routing      Follow up with physician 3 weeks. The patient can proceed with treatment.  He will need a CBC, CMP, and TSH on 1/27/23 with an appt with me and treatment that day.  Cycle 3 needs to be moved to 2/24/23.   Follow up with AUGUSTINE    Infusion scheduling note    Injection scheduling note    Labs    Imaging    Pharmacy appointment    Other referrals        Treatment Plan Information   OP NIVOLUMAB 3MG/KG IPILIMUMAB 1MG/KG FOLLOWED BY NIVOLUMAB 480MG Q4W   Real Hart MD   Upcoming Treatment Dates - OP NIVOLUMAB 3MG/KG IPILIMUMAB 1MG/KG FOLLOWED BY NIVOLUMAB 480MG Q4W    1/27/2023       Chemotherapy       nivolumab 291 mg in sodium chloride 0.9% 129.1 mL infusion       ipilimumab (YERVOY) 1 mg/kg = 97 mg in sodium chloride 0.9% 119.4 mL chemo infusion  2/24/2023       Chemotherapy       nivolumab 480 mg in sodium chloride 0.9% 148 mL infusion  3/24/2023       Chemotherapy       nivolumab 480 mg in sodium chloride 0.9% 148 mL infusion  4/21/2023       Chemotherapy       nivolumab 480 mg in sodium chloride 0.9% 148 mL infusion      Real Hart MD  [facility]  Hematology and Oncology  [facility]   [address] [place] [address]   O: [phone]  F: [phone]

## 2023-01-05 NOTE — PROGRESS NOTES
Pharmacy Treatment Plan Review    Patient  Stevan Charles Jr., 82 y.o.  1940    Indication: Melanoma     History:  Per office visit with Dr. Hart 12/21/2022  Mr. Charles is a 82 y.o. male with HTN, a-fib who presents for melanoma.  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  MRI brain 11/01/2022 showed defect in the left paramedian posterior frontal parietal scalp with extension to the outer cortical margin the calvarium without obvious intraosseous or intracranial extension.  PET-CT on 11/02/2022 showed a markedly hypermetabolic cutaneous nodule at the skull vertex along the midline consistent with the patient's known melanoma measuring 2.9 x 2.3 x 1.5 cm; hypermetabolic nodule in the anterior aspect of the right deltoid was muscle measuring 1.8 x 1.3 cm; and hypermetabolic cutaneous nodule at the right posterior elbow measuring 3 x 1.6 cm.  Right anterior chest wall lesion was biopsied 11/16/2022 positive for foreign body giant cell reaction.  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  [id number] (microsatellite with no known regional lymph node disease).  Pt was discussed at tumor board with recommendation for Neoadjuvant Ipi/Nivo for 2 cycles prior to further resection.      Labs:  CBC  Lab Results   Component Value Date    WBC 4.73 01/05/2023    HGB 15.4 01/05/2023    HCT 45.6 01/05/2023    MCV 99 (H) 01/05/2023     01/05/2023       BMP  Lab Results   Component Value Date     01/05/2023    K 4.0 01/05/2023     01/05/2023    CO2 25 01/05/2023    BUN 16 01/05/2023    CREATININE 1.4 01/05/2023    CALCIUM 9.5 01/05/2023    ANIONGAP 12 01/05/2023       LFTs  Lab Results   Component Value Date    ALT 19 01/05/2023    AST 25 01/05/2023    ALKPHOS 92 01/05/2023    BILITOT 1.0  01/05/2023       The patient is scheduled to start the following infusion:   OP NIVOLUMAB 3MG/KG IPILIMUMAB 1MG/KG FOLLOWED BY NIVOLUMAB 480MG Q4W    21-day cycle for 4 cycles of:    -Nivolumab 3 mg/kg in sodium chloride 0.9% 100 mL, infusion, intravneous, on day 1    -Ipilimumab 1 mg/kg in sodium chloride 0.9% 100 mL, infusion, intravneous, on day 1    The treatment plan is per [facility] guidelines    Rc Ren PharmD
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
## 2023-01-06 ENCOUNTER — DOCUMENTATION ONLY (OUTPATIENT)
Dept: INFUSION THERAPY | Facility: HOSPITAL | Age: 83
End: 2023-01-06

## 2023-01-06 ENCOUNTER — INFUSION (OUTPATIENT)
Dept: INFUSION THERAPY | Facility: HOSPITAL | Age: 83
End: 2023-01-06
Attending: INTERNAL MEDICINE
Payer: COMMERCIAL

## 2023-01-06 ENCOUNTER — OFFICE VISIT (OUTPATIENT)
Dept: HEMATOLOGY/ONCOLOGY | Facility: CLINIC | Age: 83
End: 2023-01-06
Payer: COMMERCIAL

## 2023-01-06 ENCOUNTER — TELEPHONE (OUTPATIENT)
Dept: HEMATOLOGY/ONCOLOGY | Facility: CLINIC | Age: 83
End: 2023-01-06
Payer: COMMERCIAL

## 2023-01-06 VITALS
TEMPERATURE: 98 F | BODY MASS INDEX: 27.26 KG/M2 | WEIGHT: 205.69 LBS | DIASTOLIC BLOOD PRESSURE: 63 MMHG | RESPIRATION RATE: 18 BRPM | HEIGHT: 73 IN | HEART RATE: 54 BPM | OXYGEN SATURATION: 96 % | SYSTOLIC BLOOD PRESSURE: 133 MMHG

## 2023-01-06 VITALS
BODY MASS INDEX: 27.26 KG/M2 | HEIGHT: 73 IN | TEMPERATURE: 98 F | OXYGEN SATURATION: 96 % | HEART RATE: 55 BPM | SYSTOLIC BLOOD PRESSURE: 116 MMHG | WEIGHT: 205.69 LBS | DIASTOLIC BLOOD PRESSURE: 58 MMHG

## 2023-01-06 DIAGNOSIS — I10 PRIMARY HYPERTENSION: ICD-10-CM

## 2023-01-06 DIAGNOSIS — C43.4 MALIGNANT MELANOMA OF SCALP: Primary | ICD-10-CM

## 2023-01-06 DIAGNOSIS — I48.0 PAROXYSMAL ATRIAL FIBRILLATION: ICD-10-CM

## 2023-01-06 PROCEDURE — 3288F FALL RISK ASSESSMENT DOCD: CPT | Mod: CPTII,S$GLB,, | Performed by: INTERNAL MEDICINE

## 2023-01-06 PROCEDURE — 1126F PR PAIN SEVERITY QUANTIFIED, NO PAIN PRESENT: ICD-10-PCS | Mod: CPTII,S$GLB,, | Performed by: INTERNAL MEDICINE

## 2023-01-06 PROCEDURE — 3288F PR FALLS RISK ASSESSMENT DOCUMENTED: ICD-10-PCS | Mod: CPTII,S$GLB,, | Performed by: INTERNAL MEDICINE

## 2023-01-06 PROCEDURE — 99999 PR PBB SHADOW E&M-EST. PATIENT-LVL III: ICD-10-PCS | Mod: PBBFAC,,, | Performed by: INTERNAL MEDICINE

## 2023-01-06 PROCEDURE — 96417 CHEMO IV INFUS EACH ADDL SEQ: CPT | Mod: PN

## 2023-01-06 PROCEDURE — 3078F PR MOST RECENT DIASTOLIC BLOOD PRESSURE < 80 MM HG: ICD-10-PCS | Mod: CPTII,S$GLB,, | Performed by: INTERNAL MEDICINE

## 2023-01-06 PROCEDURE — 1101F PT FALLS ASSESS-DOCD LE1/YR: CPT | Mod: CPTII,S$GLB,, | Performed by: INTERNAL MEDICINE

## 2023-01-06 PROCEDURE — 99999 PR PBB SHADOW E&M-EST. PATIENT-LVL III: CPT | Mod: PBBFAC,,, | Performed by: INTERNAL MEDICINE

## 2023-01-06 PROCEDURE — 3074F SYST BP LT 130 MM HG: CPT | Mod: CPTII,S$GLB,, | Performed by: INTERNAL MEDICINE

## 2023-01-06 PROCEDURE — 99215 PR OFFICE/OUTPT VISIT, EST, LEVL V, 40-54 MIN: ICD-10-PCS | Mod: S$GLB,,, | Performed by: INTERNAL MEDICINE

## 2023-01-06 PROCEDURE — 3074F PR MOST RECENT SYSTOLIC BLOOD PRESSURE < 130 MM HG: ICD-10-PCS | Mod: CPTII,S$GLB,, | Performed by: INTERNAL MEDICINE

## 2023-01-06 PROCEDURE — 3078F DIAST BP <80 MM HG: CPT | Mod: CPTII,S$GLB,, | Performed by: INTERNAL MEDICINE

## 2023-01-06 PROCEDURE — 1126F AMNT PAIN NOTED NONE PRSNT: CPT | Mod: CPTII,S$GLB,, | Performed by: INTERNAL MEDICINE

## 2023-01-06 PROCEDURE — 25000003 PHARM REV CODE 250: Mod: PN | Performed by: INTERNAL MEDICINE

## 2023-01-06 PROCEDURE — 96413 CHEMO IV INFUSION 1 HR: CPT | Mod: PN

## 2023-01-06 PROCEDURE — 99215 OFFICE O/P EST HI 40 MIN: CPT | Mod: S$GLB,,, | Performed by: INTERNAL MEDICINE

## 2023-01-06 PROCEDURE — 63600175 PHARM REV CODE 636 W HCPCS: Mod: JG,PN | Performed by: INTERNAL MEDICINE

## 2023-01-06 PROCEDURE — 1101F PR PT FALLS ASSESS DOC 0-1 FALLS W/OUT INJ PAST YR: ICD-10-PCS | Mod: CPTII,S$GLB,, | Performed by: INTERNAL MEDICINE

## 2023-01-06 RX ORDER — DIPHENHYDRAMINE HYDROCHLORIDE 50 MG/ML
25 INJECTION INTRAMUSCULAR; INTRAVENOUS EVERY 10 MIN PRN
Status: CANCELLED | OUTPATIENT
Start: 2023-01-06 | End: 2023-01-07

## 2023-01-06 RX ORDER — EPINEPHRINE 0.3 MG/.3ML
0.3 INJECTION SUBCUTANEOUS ONCE AS NEEDED
Status: CANCELLED | OUTPATIENT
Start: 2023-01-06

## 2023-01-06 RX ORDER — SODIUM CHLORIDE 0.9 % (FLUSH) 0.9 %
10 SYRINGE (ML) INJECTION
Status: CANCELLED | OUTPATIENT
Start: 2023-01-06

## 2023-01-06 RX ORDER — SODIUM CHLORIDE 0.9 % (FLUSH) 0.9 %
10 SYRINGE (ML) INJECTION
Status: DISCONTINUED | OUTPATIENT
Start: 2023-01-06 | End: 2023-01-06 | Stop reason: HOSPADM

## 2023-01-06 RX ORDER — METHYLPREDNISOLONE SOD SUCC 125 MG
125 VIAL (EA) INJECTION ONCE AS NEEDED
Status: DISCONTINUED | OUTPATIENT
Start: 2023-01-06 | End: 2023-01-06 | Stop reason: HOSPADM

## 2023-01-06 RX ORDER — DIPHENHYDRAMINE HYDROCHLORIDE 50 MG/ML
25 INJECTION INTRAMUSCULAR; INTRAVENOUS EVERY 10 MIN PRN
Status: DISCONTINUED | OUTPATIENT
Start: 2023-01-06 | End: 2023-01-06 | Stop reason: HOSPADM

## 2023-01-06 RX ORDER — METHYLPREDNISOLONE SOD SUCC 125 MG
125 VIAL (EA) INJECTION ONCE AS NEEDED
Status: CANCELLED | OUTPATIENT
Start: 2023-01-06

## 2023-01-06 RX ORDER — HEPARIN 100 UNIT/ML
500 SYRINGE INTRAVENOUS
Status: CANCELLED | OUTPATIENT
Start: 2023-01-06

## 2023-01-06 RX ORDER — EPINEPHRINE 0.3 MG/.3ML
0.3 INJECTION SUBCUTANEOUS ONCE AS NEEDED
Status: DISCONTINUED | OUTPATIENT
Start: 2023-01-06 | End: 2023-01-06 | Stop reason: HOSPADM

## 2023-01-06 RX ADMIN — SODIUM CHLORIDE 280 MG: 9 INJECTION, SOLUTION INTRAVENOUS at 12:01

## 2023-01-06 RX ADMIN — SODIUM CHLORIDE 97 MG: 9 INJECTION, SOLUTION INTRAVENOUS at 01:01

## 2023-01-06 NOTE — PLAN OF CARE
Problem: Adult Inpatient Plan of Care  Goal: Patient-Specific Goal (Individualized)  1/6/2023 1513 by Wilson Geronimo RN  Outcome: Ongoing, Progressing  Flowsheets (Taken 1/6/2023 1420)  Anxieties, Fears or Concerns: None  Individualized Care Needs: Reclier, blanket  1/6/2023 1217 by Wilson Geronimo RN  Outcome: Ongoing, Progressing  Flowsheets (Taken 1/6/2023 1217)  Anxieties, Fears or Concerns: None  Individualized Care Needs: Recliner     Problem: Fatigue  Goal: Improved Activity Tolerance  Intervention: Promote Improved Energy  1/6/2023 1513 by Wilson Geronimo RN  Flowsheets (Taken 1/6/2023 1420)  Fatigue Management:   activity schedule adjusted   frequent rest breaks encouraged   paced activity encouraged   fatigue-related activity identified  Sleep/Rest Enhancement:   regular sleep/rest pattern promoted   relaxation techniques promoted  Activity Management:   Ambulated -L4   Ambulated in [place] [id number]  1/6/2023 1217 by Wilson Geronimo RN  Flowsheets (Taken 1/6/2023 1217)  Fatigue Management:   activity schedule adjusted   frequent rest breaks encouraged   paced activity encouraged   fatigue-related activity identified  Sleep/Rest Enhancement:   regular sleep/rest pattern promoted   relaxation techniques promoted  Activity Management:   Ambulated -L4   Ambulated in [place] [id number]     Problem: Adult Inpatient Plan of Care  Goal: Plan of Care Review  1/6/2023 1513 by Wilson Geronimo RN  Outcome: Ongoing, Progressing  Flowsheets (Taken 1/6/2023 1420)  Plan of Care Reviewed With: patient  1/6/2023 1217 by Wilson Geronimo RN  Outcome: Ongoing, Progressing  Flowsheets (Taken 1/6/2023 1217)  Plan of Care Reviewed With: patient  Tolerated treatment with no known distress.  Ambulated from infusion center with steady gait.

## 2023-01-06 NOTE — PROGRESS NOTES
Oncology   Chemotherapy School Education  Stevan Charles .   1940    Social Service Education   This is a 82 y.o.male with a medical diagnosis of malignant melanoma of scalp.     Current Support System: Pt is here to day with his wife Nohemi. He reports it is just the two of them here in [place]. They have 6 children combined but they all live out of state. They have 8 grandchildren and 12 great grandchildren. They recently lost a grandchild and were not able to go to the  services due to the pt's current medical situation. Sw provided emotional support.      Met with pt for new pt education. Reviewed role of  during cancer treatment. Educated on role of SW on care team and resources available at the cancer center.     Answered all psychosocial/socioeconomic related questions. Pt and wife are both retired. 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 They live on halfway income. They are driving over 80 miles round trip to come to treatment, They can use assistance from a gas card. SW provided them with a gas card today. They will bring prof of  income for SW. Support enrolment completed. No other needs noted today.      Patient provided with social contact number and advised to call with questions or make future appointment if further intervention is needed. SW to follow throughout tx.    Monica Meeks, SAAD  2023  2:27 PM

## 2023-01-06 NOTE — PROGRESS NOTES
Oncology Nutrition   New Patient Education  Stevan Charles    1940    Nutrition Education   This is a 82 y.o.male with a medical diagnosis of malignant melanoma of scalp.     Met w/ pt and his wife, Nohemi, to discuss current nutritional status and nutrition as it relates to cancer and cancer treatment. Pt currently low nutrition risk. Pt states the last few days he may have eaten a little less than normal r/t nerves with starting treatment. Otherwise, no concerns.     Wt Readings from Last 10 Encounters:   01/06/23 93.3 kg (205 lb 11 oz)   01/06/23 93.3 kg (205 lb 11 oz)   01/05/23 93.9 kg (207 lb 0.2 oz)   01/05/23 93.9 kg (207 lb 2 oz)   12/21/22 97 kg (213 lb 13.5 oz)   12/15/22 95.6 kg (210 lb 12.2 oz)   12/08/22 96 kg (211 lb 10.3 oz)   12/02/22 96 kg (211 lb 10.3 oz)   11/28/22 97.3 kg (214 lb 8.1 oz)   11/16/22 95.3 kg (210 lb)      [x] PMHx reviewed  [x] Labs reviewed    Educated on food safety and common nutrition impact symptoms associated with chemotherapy treatment. Reinforced the importance of good hydration. Handouts provided.    Answered all nutrition related questions.     Patient provided with dietitian contact number and advised to call with questions or make future appointment if further intervention is needed. RD to follow throughout tx PRN.    Zaira Welch, MS, RD, LDN  01/06/2023  12:22 PM

## 2023-01-06 NOTE — TELEPHONE ENCOUNTER
LVM that pt's surgery date of 2/16/23 with Dr Radford is unchanged.  Pt has appt with Dr Hart today, instructed pt to see me if he has any questions.

## 2023-01-09 PROBLEM — S01.00XA OPEN WOUND OF SCALP: Status: ACTIVE | Noted: 2023-01-09

## 2023-01-17 ENCOUNTER — TELEPHONE (OUTPATIENT)
Dept: HEMATOLOGY/ONCOLOGY | Facility: CLINIC | Age: 83
End: 2023-01-17
Payer: COMMERCIAL

## 2023-01-17 ENCOUNTER — PATIENT MESSAGE (OUTPATIENT)
Dept: HEMATOLOGY/ONCOLOGY | Facility: CLINIC | Age: 83
End: 2023-01-17
Payer: COMMERCIAL

## 2023-01-24 DIAGNOSIS — C43.4 MALIGNANT MELANOMA OF SCALP: Primary | ICD-10-CM

## 2023-01-27 ENCOUNTER — DOCUMENTATION ONLY (OUTPATIENT)
Dept: HEMATOLOGY/ONCOLOGY | Facility: CLINIC | Age: 83
End: 2023-01-27
Payer: COMMERCIAL

## 2023-01-27 ENCOUNTER — OFFICE VISIT (OUTPATIENT)
Dept: HEMATOLOGY/ONCOLOGY | Facility: CLINIC | Age: 83
End: 2023-01-27
Payer: COMMERCIAL

## 2023-01-27 ENCOUNTER — LAB VISIT (OUTPATIENT)
Dept: LAB | Facility: HOSPITAL | Age: 83
End: 2023-01-27
Attending: INTERNAL MEDICINE
Payer: COMMERCIAL

## 2023-01-27 ENCOUNTER — INFUSION (OUTPATIENT)
Dept: INFUSION THERAPY | Facility: HOSPITAL | Age: 83
End: 2023-01-27
Attending: INTERNAL MEDICINE
Payer: COMMERCIAL

## 2023-01-27 ENCOUNTER — TELEPHONE (OUTPATIENT)
Dept: HEMATOLOGY/ONCOLOGY | Facility: CLINIC | Age: 83
End: 2023-01-27
Payer: COMMERCIAL

## 2023-01-27 ENCOUNTER — DOCUMENTATION ONLY (OUTPATIENT)
Dept: INFUSION THERAPY | Facility: HOSPITAL | Age: 83
End: 2023-01-27

## 2023-01-27 VITALS
SYSTOLIC BLOOD PRESSURE: 128 MMHG | DIASTOLIC BLOOD PRESSURE: 88 MMHG | WEIGHT: 204.56 LBS | BODY MASS INDEX: 27.11 KG/M2 | TEMPERATURE: 98 F | HEIGHT: 73 IN | HEART RATE: 54 BPM | OXYGEN SATURATION: 99 %

## 2023-01-27 VITALS
BODY MASS INDEX: 27.11 KG/M2 | DIASTOLIC BLOOD PRESSURE: 70 MMHG | OXYGEN SATURATION: 99 % | TEMPERATURE: 98 F | HEART RATE: 56 BPM | SYSTOLIC BLOOD PRESSURE: 137 MMHG | WEIGHT: 204.56 LBS | RESPIRATION RATE: 18 BRPM | HEIGHT: 73 IN

## 2023-01-27 DIAGNOSIS — C43.4 MALIGNANT MELANOMA OF SCALP: ICD-10-CM

## 2023-01-27 DIAGNOSIS — I10 PRIMARY HYPERTENSION: ICD-10-CM

## 2023-01-27 DIAGNOSIS — I48.0 PAROXYSMAL ATRIAL FIBRILLATION: ICD-10-CM

## 2023-01-27 DIAGNOSIS — C43.4 MALIGNANT MELANOMA OF SCALP: Primary | ICD-10-CM

## 2023-01-27 LAB
ALBUMIN SERPL BCP-MCNC: 3.7 G/DL (ref 3.5–5.2)
ALP SERPL-CCNC: 98 U/L (ref 55–135)
ALT SERPL W/O P-5'-P-CCNC: 27 U/L (ref 10–44)
ANION GAP SERPL CALC-SCNC: 10 MMOL/L (ref 8–16)
AST SERPL-CCNC: 25 U/L (ref 10–40)
BASOPHILS # BLD AUTO: 0.06 K/UL (ref 0–0.2)
BASOPHILS NFR BLD: 1.3 % (ref 0–1.9)
BILIRUB SERPL-MCNC: 0.7 MG/DL (ref 0.1–1)
BUN SERPL-MCNC: 15 MG/DL (ref 8–23)
CALCIUM SERPL-MCNC: 9.1 MG/DL (ref 8.7–10.5)
CHLORIDE SERPL-SCNC: 103 MMOL/L (ref 95–110)
CO2 SERPL-SCNC: 25 MMOL/L (ref 23–29)
CREAT SERPL-MCNC: 1.5 MG/DL (ref 0.5–1.4)
DIFFERENTIAL METHOD: ABNORMAL
EOSINOPHIL # BLD AUTO: 0.2 K/UL (ref 0–0.5)
EOSINOPHIL NFR BLD: 4.5 % (ref 0–8)
ERYTHROCYTE [DISTWIDTH] IN BLOOD BY AUTOMATED COUNT: 13.6 % (ref 11.5–14.5)
EST. GFR  (NO RACE VARIABLE): 46.5 ML/MIN/1.73 M^2
GLUCOSE SERPL-MCNC: 98 MG/DL (ref 70–110)
HCT VFR BLD AUTO: 44.9 % (ref 40–54)
HGB BLD-MCNC: 15 G/DL (ref 14–18)
IMM GRANULOCYTES # BLD AUTO: 0.03 K/UL (ref 0–0.04)
IMM GRANULOCYTES NFR BLD AUTO: 0.6 % (ref 0–0.5)
LYMPHOCYTES # BLD AUTO: 1.1 K/UL (ref 1–4.8)
LYMPHOCYTES NFR BLD: 22.5 % (ref 18–48)
MCH RBC QN AUTO: 33.4 PG (ref 27–31)
MCHC RBC AUTO-ENTMCNC: 33.4 G/DL (ref 32–36)
MCV RBC AUTO: 100 FL (ref 82–98)
MONOCYTES # BLD AUTO: 0.6 K/UL (ref 0.3–1)
MONOCYTES NFR BLD: 13.1 % (ref 4–15)
NEUTROPHILS # BLD AUTO: 2.7 K/UL (ref 1.8–7.7)
NEUTROPHILS NFR BLD: 58 % (ref 38–73)
NRBC BLD-RTO: 0 /100 WBC
PLATELET # BLD AUTO: 212 K/UL (ref 150–450)
PMV BLD AUTO: 10.7 FL (ref 9.2–12.9)
POTASSIUM SERPL-SCNC: 4.2 MMOL/L (ref 3.5–5.1)
PROT SERPL-MCNC: 6.4 G/DL (ref 6–8.4)
RBC # BLD AUTO: 4.49 M/UL (ref 4.6–6.2)
SODIUM SERPL-SCNC: 138 MMOL/L (ref 136–145)
TSH SERPL DL<=0.005 MIU/L-ACNC: 1.75 UIU/ML (ref 0.4–4)
WBC # BLD AUTO: 4.67 K/UL (ref 3.9–12.7)

## 2023-01-27 PROCEDURE — 25000003 PHARM REV CODE 250: Mod: PN | Performed by: INTERNAL MEDICINE

## 2023-01-27 PROCEDURE — 85025 COMPLETE CBC W/AUTO DIFF WBC: CPT | Mod: PN | Performed by: INTERNAL MEDICINE

## 2023-01-27 PROCEDURE — 3079F PR MOST RECENT DIASTOLIC BLOOD PRESSURE 80-89 MM HG: ICD-10-PCS | Mod: CPTII,S$GLB,, | Performed by: INTERNAL MEDICINE

## 2023-01-27 PROCEDURE — 1126F AMNT PAIN NOTED NONE PRSNT: CPT | Mod: CPTII,S$GLB,, | Performed by: INTERNAL MEDICINE

## 2023-01-27 PROCEDURE — 99999 PR PBB SHADOW E&M-EST. PATIENT-LVL III: ICD-10-PCS | Mod: PBBFAC,,, | Performed by: INTERNAL MEDICINE

## 2023-01-27 PROCEDURE — 3074F PR MOST RECENT SYSTOLIC BLOOD PRESSURE < 130 MM HG: ICD-10-PCS | Mod: CPTII,S$GLB,, | Performed by: INTERNAL MEDICINE

## 2023-01-27 PROCEDURE — 3074F SYST BP LT 130 MM HG: CPT | Mod: CPTII,S$GLB,, | Performed by: INTERNAL MEDICINE

## 2023-01-27 PROCEDURE — 1126F PR PAIN SEVERITY QUANTIFIED, NO PAIN PRESENT: ICD-10-PCS | Mod: CPTII,S$GLB,, | Performed by: INTERNAL MEDICINE

## 2023-01-27 PROCEDURE — 84443 ASSAY THYROID STIM HORMONE: CPT | Performed by: INTERNAL MEDICINE

## 2023-01-27 PROCEDURE — 1101F PR PT FALLS ASSESS DOC 0-1 FALLS W/OUT INJ PAST YR: ICD-10-PCS | Mod: CPTII,S$GLB,, | Performed by: INTERNAL MEDICINE

## 2023-01-27 PROCEDURE — 99999 PR PBB SHADOW E&M-EST. PATIENT-LVL III: CPT | Mod: PBBFAC,,, | Performed by: INTERNAL MEDICINE

## 2023-01-27 PROCEDURE — 80053 COMPREHEN METABOLIC PANEL: CPT | Mod: PN | Performed by: INTERNAL MEDICINE

## 2023-01-27 PROCEDURE — 3288F FALL RISK ASSESSMENT DOCD: CPT | Mod: CPTII,S$GLB,, | Performed by: INTERNAL MEDICINE

## 2023-01-27 PROCEDURE — 3288F PR FALLS RISK ASSESSMENT DOCUMENTED: ICD-10-PCS | Mod: CPTII,S$GLB,, | Performed by: INTERNAL MEDICINE

## 2023-01-27 PROCEDURE — 96417 CHEMO IV INFUS EACH ADDL SEQ: CPT | Mod: PN

## 2023-01-27 PROCEDURE — 99215 PR OFFICE/OUTPT VISIT, EST, LEVL V, 40-54 MIN: ICD-10-PCS | Mod: S$GLB,,, | Performed by: INTERNAL MEDICINE

## 2023-01-27 PROCEDURE — 63600175 PHARM REV CODE 636 W HCPCS: Mod: JG,PN | Performed by: INTERNAL MEDICINE

## 2023-01-27 PROCEDURE — 1101F PT FALLS ASSESS-DOCD LE1/YR: CPT | Mod: CPTII,S$GLB,, | Performed by: INTERNAL MEDICINE

## 2023-01-27 PROCEDURE — 96413 CHEMO IV INFUSION 1 HR: CPT | Mod: PN

## 2023-01-27 PROCEDURE — 3079F DIAST BP 80-89 MM HG: CPT | Mod: CPTII,S$GLB,, | Performed by: INTERNAL MEDICINE

## 2023-01-27 PROCEDURE — 99215 OFFICE O/P EST HI 40 MIN: CPT | Mod: S$GLB,,, | Performed by: INTERNAL MEDICINE

## 2023-01-27 PROCEDURE — 36415 COLL VENOUS BLD VENIPUNCTURE: CPT | Mod: PN | Performed by: INTERNAL MEDICINE

## 2023-01-27 RX ORDER — DIPHENHYDRAMINE HYDROCHLORIDE 50 MG/ML
25 INJECTION INTRAMUSCULAR; INTRAVENOUS EVERY 10 MIN PRN
Status: DISCONTINUED | OUTPATIENT
Start: 2023-01-27 | End: 2023-01-27 | Stop reason: HOSPADM

## 2023-01-27 RX ORDER — EPINEPHRINE 0.3 MG/.3ML
0.3 INJECTION SUBCUTANEOUS ONCE AS NEEDED
Status: DISCONTINUED | OUTPATIENT
Start: 2023-01-27 | End: 2023-01-27 | Stop reason: HOSPADM

## 2023-01-27 RX ORDER — SODIUM CHLORIDE 0.9 % (FLUSH) 0.9 %
10 SYRINGE (ML) INJECTION
Status: CANCELLED | OUTPATIENT
Start: 2023-01-27

## 2023-01-27 RX ORDER — EPINEPHRINE 0.3 MG/.3ML
0.3 INJECTION SUBCUTANEOUS ONCE AS NEEDED
Status: CANCELLED | OUTPATIENT
Start: 2023-01-27

## 2023-01-27 RX ORDER — DIPHENHYDRAMINE HYDROCHLORIDE 50 MG/ML
25 INJECTION INTRAMUSCULAR; INTRAVENOUS EVERY 10 MIN PRN
Status: CANCELLED | OUTPATIENT
Start: 2023-01-27 | End: 2023-01-28

## 2023-01-27 RX ORDER — METHYLPREDNISOLONE SOD SUCC 125 MG
125 VIAL (EA) INJECTION ONCE AS NEEDED
Status: DISCONTINUED | OUTPATIENT
Start: 2023-01-27 | End: 2023-01-27 | Stop reason: HOSPADM

## 2023-01-27 RX ORDER — HEPARIN 100 UNIT/ML
500 SYRINGE INTRAVENOUS
Status: CANCELLED | OUTPATIENT
Start: 2023-01-27

## 2023-01-27 RX ORDER — METHYLPREDNISOLONE SOD SUCC 125 MG
125 VIAL (EA) INJECTION ONCE AS NEEDED
Status: CANCELLED | OUTPATIENT
Start: 2023-01-27

## 2023-01-27 RX ORDER — SODIUM CHLORIDE 0.9 % (FLUSH) 0.9 %
10 SYRINGE (ML) INJECTION
Status: DISCONTINUED | OUTPATIENT
Start: 2023-01-27 | End: 2023-01-27 | Stop reason: HOSPADM

## 2023-01-27 RX ADMIN — SODIUM CHLORIDE 260 MG: 9 INJECTION, SOLUTION INTRAVENOUS at 03:01

## 2023-01-27 RX ADMIN — SODIUM CHLORIDE 93 MG: 9 INJECTION, SOLUTION INTRAVENOUS at 03:01

## 2023-01-27 RX ADMIN — SODIUM CHLORIDE: 9 INJECTION, SOLUTION INTRAVENOUS at 02:01

## 2023-01-27 NOTE — PROGRESS NOTES
Oncology   Chemotherapy Infusion Visit    Quick Social Service Status Follow Up   Met w/ pt briefly to follow up on social and emotional needs since initiation of treatment.    SW met with pt and wife at chairside. They provided SW with prof of income needed for support enrolment. SW provided pt with a gas card. Pt;s wife said this is very helpful.          Monica Meeks, SAAD  01/27/2023  4:35 PM

## 2023-01-27 NOTE — PLAN OF CARE
Problem: Adult Inpatient Plan of Care  Goal: Patient-Specific Goal (Individualized)  Outcome: Ongoing, Progressing  Flowsheets (Taken 1/27/2023 1635)  Anxieties, Fears or Concerns: None  Individualized Care Needs: Rectiff baum     Problem: Fatigue  Goal: Improved Activity Tolerance  Intervention: Promote Improved Energy  Flowsheets (Taken 1/27/2023 1635)  Fatigue Management:   activity schedule adjusted   frequent rest breaks encouraged   paced activity encouraged   fatigue-related activity identified  Sleep/Rest Enhancement:   regular sleep/rest pattern promoted   relaxation techniques promoted  Activity Management:   Ambulated -L4   Ambulated in [place] - L4     Problem: Adult Inpatient Plan of Care  Goal: Plan of Care Review  Outcome: Ongoing, Progressing  Flowsheets (Taken 1/27/2023 1635)  Plan of Care Reviewed With: patient  Tolerated treatment with no known distress. Ambulated from infusion center with steady gait.      Yes

## 2023-01-27 NOTE — PROGRESS NOTES
PATIENT: Stevan Charles Jr.  MRN: [id number]  DATE: 1/27/2023      Diagnosis:   1. Malignant melanoma of scalp    2. Primary hypertension    3. Paroxysmal atrial fibrillation            Chief Complaint: Melanoma (Melanoma of scalp)      Oncologic History:      Oncologic History     Oncologic Treatment     Pathology           Subjective:    Interval History:  Mr. Charles is a 82 y.o. male with HTN, a-fib who presents for melanoma.  Since the last clinic visit the patient underwent 1st cycle of Ipi/Nivo on 01/06/2023.  The patient states he had no side effects from the treatment. The patient denies CP, cough, SOB, abdominal pain, N/V, constipation, diarrhea.  The patient denies fever, chills, night sweats, weight loss, new lumps or bumps, easy bruising or bleeding.    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  MRI brain 11/01/2022 showed defect in the left paramedian posterior frontal parietal scalp with extension to the outer cortical margin the calvarium without obvious intraosseous or intracranial extension.  PET-CT on 11/02/2022 showed a markedly hypermetabolic cutaneous nodule at the skull vertex along the midline consistent with the patient's known melanoma measuring 2.9 x 2.3 x 1.5 cm; hypermetabolic nodule in the anterior aspect of the right deltoid was muscle measuring 1.8 x 1.3 cm; and hypermetabolic cutaneous nodule at the right posterior elbow measuring 3 x 1.6 cm.  Right anterior chest wall lesion was biopsied 11/16/2022 positive for foreign body giant cell reaction.  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  [id number] (microsatellite with no known regional lymph node disease).  Pt was discussed at tumor board with recommendation for Neoadjuvant Ipi/Nivo for 2 cycles prior to  further resection.    Past Medical History:   Past Medical History:   Diagnosis Date    Essential (primary) hypertension     Malignant melanoma of skin, unspecified     Paroxysmal atrial fibrillation        Past Surgical HIstory:   Past Surgical History:   Procedure Laterality Date    CARDIOVERSION N/A     EXCISION OF LESION N/A 2022    Procedure: EXCISION, LESION scalp-melanoma;  Surgeon: Jenna Radford MD;  Location: [facility];  Service: ENT;  Laterality: N/A;    EYE SURGERY Bilateral     cataracts    FOREIGN BODY REMOVAL Right     chest--piece of wooden board    SPLENECTOMY, TOTAL      TONSILLECTOMY      WRIST SURGERY Right        Family History:   Family History   Problem Relation Age of Onset    Stroke Mother 81         from stroke    Kidney disease Father 72        dialysis /    Breast cancer Sister        Social History:  reports that he has quit smoking. His smoking use included cigars. He has never used smokeless tobacco. He reports that he does not drink alcohol and does not use drugs.    Allergies:  Review of patient's allergies indicates:  No Known Allergies    Medications:  Current Outpatient Medications   Medication Sig Dispense Refill    acebutoloL (SECTRAL) 400 mg capsule Take 200 mg by mouth every evening.      acetaminophen (TYLENOL) 325 MG tablet Take 650 mg by mouth every 4 (four) hours as needed.      amiodarone (PACERONE) 200 MG Tab Take 200 mg by mouth after dinner.      ondansetron (ZOFRAN-ODT) 8 MG TbDL Take 1 tablet (8 mg total) by mouth every 8 (eight) hours as needed (nausea). 40 tablet 3    promethazine (PHENERGAN) 12.5 MG Tab (Take 1-2 tabs every 6 hours as needed for nausea persistent despite zofran) 40 tablet 3    valsartan-hydrochlorothiazide (DIOVAN-HCT) 80-12.5 mg per tablet Take 1 tablet by mouth after lunch.       No current facility-administered medications for this visit.     Facility-Administered Medications Ordered in Other Visits   Medication Dose Route  "Frequency Provider Last Rate Last Admin    diphenhydrAMINE injection 25 mg  25 mg Intravenous Q10 Min PRN Real Hart MD        EPINEPHrine (EPIPEN) 0.3 mg/0.3 mL pen injection 0.3 mg  0.3 mg Intramuscular Once PRN Real Hart MD        methylPREDNISolone sodium succinate injection 125 mg  125 mg Intravenous Once PRN Real Hart MD        sodium chloride 0.9% flush 10 mL  10 mL Intravenous PRN Real Hart MD           Review of Systems   Constitutional:  Negative for appetite change, chills, diaphoresis, fatigue, fever and unexpected weight change.   HENT:  Negative for mouth sores.         Scalp defect   Eyes:  Negative for visual disturbance.   Respiratory:  Negative for cough and shortness of breath.    Cardiovascular:  Negative for chest pain and palpitations.   Gastrointestinal:  Negative for abdominal pain, constipation, diarrhea, nausea and vomiting.   Genitourinary:  Negative for frequency.   Musculoskeletal:  Negative for back pain.   Skin:  Negative for color change and rash.   Neurological:  Negative for headaches.   Hematological:  Negative for adenopathy. Does not bruise/bleed easily.   Psychiatric/Behavioral:  The patient is not nervous/anxious.      ECOG Performance Status: 0   Objective:      Vitals:   Vitals:    01/27/23 1252   BP: 128/88   BP Location: Left arm   Patient Position: Sitting   BP Method: Medium (Manual)   Pulse: (!) 54   Temp: 97.7 °F (36.5 °C)   TempSrc: Temporal   SpO2: 99%   Weight: 92.8 kg (204 lb 9.4 oz)   Height: 6' 1" (1.854 m)           Physical Exam  Constitutional:       General: He is not in acute distress.     Appearance: He is well-developed. He is not diaphoretic.   HENT:      Head: Normocephalic and atraumatic.   Cardiovascular:      Rate and Rhythm: Normal rate and regular rhythm.      Heart sounds: Normal heart sounds. No murmur heard.    No friction rub. No gallop.   Pulmonary:      Effort: Pulmonary effort is normal. No respiratory distress.      " Breath sounds: Normal breath sounds. No wheezing or rales.   Chest:      Chest wall: No tenderness.   Abdominal:      General: Bowel sounds are normal. There is no distension.      Palpations: Abdomen is soft. There is no mass.      Tenderness: There is no abdominal tenderness. There is no rebound.   Lymphadenopathy:      Cervical: No cervical adenopathy.      Upper Body:      Right upper body: No supraclavicular or axillary adenopathy.      Left upper body: No supraclavicular or axillary adenopathy.   Skin:     General: Skin is warm and dry.      Findings: No erythema or rash.      Comments: Golf ball sized defect in vertex of scalp.   Neurological:      Mental Status: He is alert and oriented to person, place, and time.      Coordination: Coordination normal.   Psychiatric:         Behavior: Behavior normal.       Laboratory Data:  Lab Visit on 01/27/2023   Component Date Value Ref Range Status    WBC 01/27/2023 4.67  3.90 - 12.70 K/uL Final    RBC 01/27/2023 4.49 (L)  4.60 - 6.20 M/uL Final    Hemoglobin 01/27/2023 15.0  14.0 - 18.0 g/dL Final    Hematocrit 01/27/2023 44.9  40.0 - 54.0 % Final    MCV 01/27/2023 100 (H)  82 - 98 fL Final    MCH 01/27/2023 33.4 (H)  27.0 - 31.0 pg Final    MCHC 01/27/2023 33.4  32.0 - 36.0 g/dL Final    RDW 01/27/2023 13.6  11.5 - 14.5 % Final    Platelets 01/27/2023 212  150 - 450 K/uL Final    MPV 01/27/2023 10.7  9.2 - 12.9 fL Final    Immature Granulocytes 01/27/2023 0.6 (H)  0.0 - 0.5 % Final    Gran # (ANC) 01/27/2023 2.7  1.8 - 7.7 K/uL Final    Immature Grans (Abs) 01/27/2023 0.03  0.00 - 0.04 K/uL Final    Comment: Mild elevation in immature granulocytes is non specific and   can be seen in a variety of conditions including stress response,   acute inflammation, trauma and pregnancy. Correlation with other   laboratory and clinical findings is essential.      Lymph # 01/27/2023 1.1  1.0 - 4.8 K/uL Final    Mono # 01/27/2023 0.6  0.3 - 1.0 K/uL Final    Eos # 01/27/2023 0.2   0.0 - 0.5 K/uL Final    Baso # 01/27/2023 0.06  0.00 - 0.20 K/uL Final    nRBC 01/27/2023 0  0 /100 WBC Final    Gran % 01/27/2023 58.0  38.0 - 73.0 % Final    Lymph % 01/27/2023 22.5  18.0 - 48.0 % Final    Mono % 01/27/2023 13.1  4.0 - 15.0 % Final    Eosinophil % 01/27/2023 4.5  0.0 - 8.0 % Final    Basophil % 01/27/2023 1.3  0.0 - 1.9 % Final    Differential Method 01/27/2023 Automated   Final    Sodium 01/27/2023 138  136 - 145 mmol/L Final    Potassium 01/27/2023 4.2  3.5 - 5.1 mmol/L Final    Chloride 01/27/2023 103  95 - 110 mmol/L Final    CO2 01/27/2023 25  23 - 29 mmol/L Final    Glucose 01/27/2023 98  70 - 110 mg/dL Final    BUN 01/27/2023 15  8 - 23 mg/dL Final    Creatinine 01/27/2023 1.5 (H)  0.5 - 1.4 mg/dL Final    Calcium 01/27/2023 9.1  8.7 - 10.5 mg/dL Final    Total Protein 01/27/2023 6.4  6.0 - 8.4 g/dL Final    Albumin 01/27/2023 3.7  3.5 - 5.2 g/dL Final    Total Bilirubin 01/27/2023 0.7  0.1 - 1.0 mg/dL Final    Comment: For infants and newborns, interpretation of results should be based  on gestational age, weight and in agreement with clinical  observations.    Premature Infant recommended reference ranges:  Up to 24 hours.............<8.0 mg/dL  Up to 48 hours............<12.0 mg/dL  3-5 days..................<15.0 mg/dL  6-29 days.................<15.0 mg/dL      Alkaline Phosphatase 01/27/2023 98  55 - 135 U/L Final    AST 01/27/2023 25  10 - 40 U/L Final    ALT 01/27/2023 27  10 - 44 U/L Final    Anion Gap 01/27/2023 10  8 - 16 mmol/L Final    eGFR 01/27/2023 46.5 (A)  >60 mL/min/1.73 m^2 Final         Imaging: MRI brain 11/01/22    Intracranial compartment:     There is no acute intracranial abnormality.  There is mild-to-moderate generalized cerebral and only mild cerebellar volume loss.  Also, there is only a mild burden of nonspecific white matter change.  There is no intracranial hemorrhage.  There is no parenchymal mass or mass effect.  There are no regions of restricted  diffusion to suggest acute infarction.  There is no abnormal extra-axial fluid collection.  There is no abnormal enhancement in the brain or its covering.  The basilar cisterns are open.  Flow voids indicating patency are present in the major vessels at the base of the brain.  The cerebellar tonsils are normal position.  Sellar structures are normal.     Skull/extracranial contents:     There is prominent soft tissue surrounding the odontoid tip which may represent pannus formation related to possible osteoarthritis or rheumatoid arthritis. This is nonspecific.  There is a defect in the left paramedian posterior frontal parietal scalp which extends to the outer cortical margin of the calvarium without obvious intra osseous or intracranial extension.  This scalp soft tissue defect restricts diffusion and enhances heterogeneously and likely represents the site of melanoma as provided in the clinical history.    PET/CT 11/02/22    Head/neck:     There is a markedly hypermetabolic cutaneous nodule at the skull vertex along the midline consistent with the patient's known melanoma.  This is best visualized and measured on the fused PET-CT coronal and sagittal images and measures 2.9 x 2.3 x 1.5 cm with a max SUV of 13.9.  No lymphadenopathy is present.     Chest:     There is a hypermetabolic nodule within the anterior aspect of the right anterior deltoid muscle highly suspicious for metastatic disease.  This cannot be well visualized on the CT images and is best visualized on the PET-CT fused images.  On image 134 of the PET-CT fused axials, the nodule measures 1.8 x 1.2 cm and has a max SUV of 9.4.  No other significant abnormal hypermetabolic foci are identified within the chest.  No hypermetabolic pulmonary nodules, lymphadenopathy, pleural effusion, or pericardial effusion are present.     Abdomen/pelvis:     No significant abnormal hypermetabolic foci are identified within the abdomen and pelvis.  No lymphadenopathy  is present.  The adrenal glands are normal.     Skeleton:     No significant abnormal hypermetabolic foci are identified within the skeleton.  There are no findings to suggest osseous metastatic disease.     Upper/lower extremities:     There is a hypermetabolic cutaneous nodule at the right posterior elbow suspicious for a skin metastasis.  This is best visualized on the PET-CT fused coronal images and on image 134 measures 3.0 x 1.6 cm with a max SUV of 11.0.   Assessment:       1. Malignant melanoma of scalp    2. Primary hypertension    3. Paroxysmal atrial fibrillation               Plan:     Melanoma - pt with stage III melanoma of the vertex of the scalp pT4a pN1c (microsatellite with no known regional lymph node disease  -Plan is to initiate treatment with FLIP dose Ipi/Nivo for 2 cycles followed by re-resection  -Pt would then need to continue single agent Nivolumab for a year  -PT consented for immunotherapy on 1/05/23  -Pt completed cycle 1 ipi/nivo on 1/06/23  -Pt to undergo cycle 2 today  -Pt to undergo surgery on 2/16/23 under the care of Dr Radford  -PT to return on 2/24/23 with labs and Nivolumab treatment    HTN - pt on valsartan-HCT, and acebutolol  -Stable  -Will monitor    A-fib - pt in NSR  -Controlled  -Pt on amiodarone and acebutolol  -Cardiology managing    Route Chart for Scheduling    Med Onc Chart Routing      Follow up with physician 4 weeks. Pt can proceed with treatment.  He will need a CBC< CMP and TSH on 2/24/23 with an appt with em that day and treatment.   Follow up with AUGUSTINE    Infusion scheduling note    Injection scheduling note    Labs    Imaging    Pharmacy appointment    Other referrals        Treatment Plan Information   OP NIVOLUMAB 3MG/KG IPILIMUMAB 1MG/KG FOLLOWED BY NIVOLUMAB 480MG Q4W   Real Hart MD   Upcoming Treatment Dates - OP NIVOLUMAB 3MG/KG IPILIMUMAB 1MG/KG FOLLOWED BY NIVOLUMAB 480MG Q4W    2/24/2023       Chemotherapy       nivolumab 480 mg in sodium chloride  0.9% 148 mL infusion  3/24/2023       Chemotherapy       nivolumab 480 mg in sodium chloride 0.9% 148 mL infusion  4/21/2023       Chemotherapy       nivolumab 480 mg in sodium chloride 0.9% 148 mL infusion  5/19/2023       Chemotherapy       nivolumab 480 mg in sodium chloride 0.9% 148 mL infusion      Real Hart MD  [facility]  Hematology and Oncology  [facility]   [address] [place] [address]   O: [phone]  F: [phone]

## 2023-01-27 NOTE — TELEPHONE ENCOUNTER
Spoke with the patients wife, Nohemi, and she said  he was doing fine and we could cancel referral.      ----- Message from Vera Martinez sent at 1/27/2023  9:32 AM CST -----  Type: Need Medical Advice   Who Called: wife of patient  Best callback number:   Additional Info: Patient wife called back and she do not wish to reschedule at this time, she stated the patient is doing okay. She will callback if needed   Thanks

## 2023-01-27 NOTE — PROGRESS NOTES
"Oncology Nutrition   Chemotherapy Infusion Visit    Nutrition Follow Up   RD met with patient and his wife at chairside during infusion treatment. Weight trending down, about 4% in the last month, however patient states he feels like he is "eating too much." Pt denies any significant nutrition related side effects. Pt and wife deny any needs from RD at this time.     Pt with paperwork for LCSW. RD let Monica LCSW know.    Wt Readings from Last 10 Encounters:   01/27/23 92.8 kg (204 lb 9.4 oz)   01/27/23 92.8 kg (204 lb 9.4 oz)   01/06/23 93.3 kg (205 lb 11 oz)   01/06/23 93.3 kg (205 lb 11 oz)   01/05/23 93.9 kg (207 lb 0.2 oz)   01/05/23 93.9 kg (207 lb 2 oz)   12/21/22 97 kg (213 lb 13.5 oz)   12/15/22 95.6 kg (210 lb 12.2 oz)   12/08/22 96 kg (211 lb 10.3 oz)   12/02/22 96 kg (211 lb 10.3 oz)       All other nutrition questions/concerns addressed as appropriate. Will continue to follow and monitor throughout treatment PRN.     Zaira Welch, MS, RD, LDN  01/27/2023  3:43 PM          "

## 2023-02-14 ENCOUNTER — OFFICE VISIT (OUTPATIENT)
Dept: HEMATOLOGY/ONCOLOGY | Facility: CLINIC | Age: 83
End: 2023-02-14
Payer: COMMERCIAL

## 2023-02-14 VITALS
OXYGEN SATURATION: 94 % | RESPIRATION RATE: 18 BRPM | HEIGHT: 73 IN | BODY MASS INDEX: 26.99 KG/M2 | HEART RATE: 55 BPM | TEMPERATURE: 97 F | SYSTOLIC BLOOD PRESSURE: 98 MMHG | DIASTOLIC BLOOD PRESSURE: 59 MMHG | WEIGHT: 203.69 LBS

## 2023-02-14 DIAGNOSIS — C43.4 MALIGNANT MELANOMA OF SCALP: Primary | ICD-10-CM

## 2023-02-14 DIAGNOSIS — S01.00XA OPEN WOUND OF SCALP, UNSPECIFIED OPEN WOUND TYPE, INITIAL ENCOUNTER: ICD-10-CM

## 2023-02-14 PROCEDURE — 3288F FALL RISK ASSESSMENT DOCD: CPT | Mod: CPTII,S$GLB,, | Performed by: OTOLARYNGOLOGY

## 2023-02-14 PROCEDURE — 99024 PR POST-OP FOLLOW-UP VISIT: ICD-10-PCS | Mod: S$GLB,,, | Performed by: OTOLARYNGOLOGY

## 2023-02-14 PROCEDURE — 3078F DIAST BP <80 MM HG: CPT | Mod: CPTII,S$GLB,, | Performed by: OTOLARYNGOLOGY

## 2023-02-14 PROCEDURE — 1101F PT FALLS ASSESS-DOCD LE1/YR: CPT | Mod: CPTII,S$GLB,, | Performed by: OTOLARYNGOLOGY

## 2023-02-14 PROCEDURE — 1126F PR PAIN SEVERITY QUANTIFIED, NO PAIN PRESENT: ICD-10-PCS | Mod: CPTII,S$GLB,, | Performed by: OTOLARYNGOLOGY

## 2023-02-14 PROCEDURE — 1101F PR PT FALLS ASSESS DOC 0-1 FALLS W/OUT INJ PAST YR: ICD-10-PCS | Mod: CPTII,S$GLB,, | Performed by: OTOLARYNGOLOGY

## 2023-02-14 PROCEDURE — 1160F PR REVIEW ALL MEDS BY PRESCRIBER/CLIN PHARMACIST DOCUMENTED: ICD-10-PCS | Mod: CPTII,S$GLB,, | Performed by: OTOLARYNGOLOGY

## 2023-02-14 PROCEDURE — 99999 PR PBB SHADOW E&M-EST. PATIENT-LVL IV: CPT | Mod: PBBFAC,,, | Performed by: OTOLARYNGOLOGY

## 2023-02-14 PROCEDURE — 3074F PR MOST RECENT SYSTOLIC BLOOD PRESSURE < 130 MM HG: ICD-10-PCS | Mod: CPTII,S$GLB,, | Performed by: OTOLARYNGOLOGY

## 2023-02-14 PROCEDURE — 3078F PR MOST RECENT DIASTOLIC BLOOD PRESSURE < 80 MM HG: ICD-10-PCS | Mod: CPTII,S$GLB,, | Performed by: OTOLARYNGOLOGY

## 2023-02-14 PROCEDURE — 1160F RVW MEDS BY RX/DR IN RCRD: CPT | Mod: CPTII,S$GLB,, | Performed by: OTOLARYNGOLOGY

## 2023-02-14 PROCEDURE — 1159F MED LIST DOCD IN RCRD: CPT | Mod: CPTII,S$GLB,, | Performed by: OTOLARYNGOLOGY

## 2023-02-14 PROCEDURE — 3288F PR FALLS RISK ASSESSMENT DOCUMENTED: ICD-10-PCS | Mod: CPTII,S$GLB,, | Performed by: OTOLARYNGOLOGY

## 2023-02-14 PROCEDURE — 1126F AMNT PAIN NOTED NONE PRSNT: CPT | Mod: CPTII,S$GLB,, | Performed by: OTOLARYNGOLOGY

## 2023-02-14 PROCEDURE — 99024 POSTOP FOLLOW-UP VISIT: CPT | Mod: S$GLB,,, | Performed by: OTOLARYNGOLOGY

## 2023-02-14 PROCEDURE — 1159F PR MEDICATION LIST DOCUMENTED IN MEDICAL RECORD: ICD-10-PCS | Mod: CPTII,S$GLB,, | Performed by: OTOLARYNGOLOGY

## 2023-02-14 PROCEDURE — 99999 PR PBB SHADOW E&M-EST. PATIENT-LVL IV: ICD-10-PCS | Mod: PBBFAC,,, | Performed by: OTOLARYNGOLOGY

## 2023-02-14 PROCEDURE — 3074F SYST BP LT 130 MM HG: CPT | Mod: CPTII,S$GLB,, | Performed by: OTOLARYNGOLOGY

## 2023-02-14 NOTE — PROGRESS NOTES
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  Patient presented about 3-4 months ago scalp. Tneder to touch. NO bleeding. No pruritis. Applying triple antibiotic S/P biopsy    Patient denies numbness and weakness of his face.  He denies parotid and neck masses.    He has no previous history of melanoma or non melanoma skin cancer and no family history of melanoma.  Hx of sunburns as a child/young adult.    11/3/2022  Patient is here today follow-up biopsy results and imaging studies.  He has no new complaints.    11/28/2022  Patient is here today for biopsy results of a possible metastasis and to discuss treatment planning.  FNA was negative for metastatic lesion.  Patient has no new complaints.    12/15/2022  Patient is here today for path and bolster removal.  He has no complaints.  He has had very minimal pain    1/5/2023  Patient is here today for wound check.  He is status post wide local excision melanoma of the scalp was sentinel lymph node biopsy.  Patient has positive margins and a satellite lesion.  After his case was presented at multidisciplinary head & neck tumor board it was decided to treat the patient with neoadjuvant immunotherapy followed by resection followed by a year of immunotherapy.    Patient has no complaints    2/14/2023  Patient is here today to discuss surgery.  He is status post wide local excision melanoma of the scalp was sentinel lymph node biopsy.  Tumor Board recommendations were that patient will be treated with neoadjuvant immunotherapy followed by resection followed by a year of immunotherapy (see plan below)    Patient has no complaints.    ROS: see HPI  Constitutional: Negative  for activity change and appetite change, weight loss.   Eyes: Negative for discharge, visual changes.   Respiratory: Negative for difficulty breathing and wheezing   Cardiovascular: Negative for chest pain.   Gastrointestinal: Negative for abdominal distention and abdominal pain.   Endocrine: Negative for cold intolerance and heat intolerance.   Genitourinary: Negative for dysuria.   Musculoskeletal: Negative for gait problem, muscle pain and joint swelling.   Skin: Negative for color change and pallor; negative for skin lesions.   Neurological: Negative for syncope and weakness; no numbness face.   Psychiatric/Behavioral: Negative for agitation and confusion; negative for depression.    Physical Exam:      Constitutional  General Appearance: well nourished, well-developed, alert, oriented, in no acute distress  Communication: ability, understanding, normal  Head and Face  Inspection: normocephalic, defect is clean with granulation tissue around edges--much smaller  Palpation: no stepoffs, sinus tenderness or masses  Parotid glands: no masses, stones, swelling or tenderness  Neurological  Cranial Nerves: grossly intact  General: no focal deficits    Excision  SKIN, VERTEX SCALP, WIDE LOCAL EXCISION   - SPINDLE CELL MELANOMA   - PRESENT AT DEEP MARGIN   - MICROSATELLITE PRESET AT 12-3 O'CLOCK PERIPHERAL MARGIN     Comment:   Immunohistochemistry was performed with appropriate controls on block  1D based on the histopathologic findings and the results are  summarized as follows:   MelanA: Highlights melanocytes   Discussed with Dr. Radford on 12/13/22.     BRAF mutation testing has been ordered and the results will be reported    separately.     MELANOMA: SYNOPTIC REPORT   PROCEDURE:   Wide local excision   SPECIMEN LATERALITY: Vertex scalp   TUMOR SITE: Vertex scalp   GROSS TUMOR SIZE: 17 mm   MACROSCOPIC SATELLITE NODULE(S): Not identified   HISTOLOGIC TYPE: Spindle cell melanoma   SURGICAL MARGIN STATUS:   Invasive  melanoma present at deep margin and 12-3:00 margin.   Margins negative for melanoma in-situ (2 mm to 3-6:00 margin)   MEASURED BRESLOW THICKNESS: 10.5 mm   DERMAL MITOTIC RATE (PER SQUARE MM): 12 mitoses per square mm   ULCERATION: Negative   GROWTH PHASE: Vertical   LEVEL  OF   INVASION (KEITH'S): Keith level V   MICROSATELLITOSIS: Present   NEUROTROPISM: Present   LYMPHOVASCULAR INVASION: Negative   TUMOR INFILTRATING LYMPHOCYTES: Present, non-brisk   TUMOR REGRESSION Negative   ASSOCIATED NEVUS Negative   IMMUNOHISTOCHEMICAL STUDIES: HMB45   IN-TRANSIT METASTASIS Negative   ADDITIONAL FINDINGS:   Not applicable   TNM CODE: pT4a pN1c (microsatellite with no known regional lymph node disease)     Records reviewed:   Med Onc Real Hart MD 1/2023  Plan:      Melanoma - pt with stage III melanoma of the vertex of the scalp pT4a pN1c (microsatellite with no known regional lymph node disease  -Plan is to initiate treatment with FLIP dose Ipi/Nivo for 2 cycles followed by re-resection  -Pt would then need to continue single agent Nivolumab for a year  -PT consented for immunotherapy on 1/05/23  -Pt completed cycle 1 ipi/nivo on 1/06/23  -Pt to undergo cycle 2 today  -Pt to undergo surgery on 2/16/23 under the care of Dr Radford  -PT to return on 2/24/23 with labs and Nivolumab treatment     Treatment Plan Information   OP NIVOLUMAB 3MG/KG IPILIMUMAB 1MG/KG FOLLOWED BY NIVOLUMAB 480MG Q4W   Real Hart MD   Upcoming Treatment Dates - OP NIVOLUMAB 3MG/KG IPILIMUMAB 1MG/KG FOLLOWED BY NIVOLUMAB 480MG Q4W     2/24/2023       Chemotherapy       nivolumab 480 mg in sodium chloride 0.9% 148 mL infusion  3/24/2023       Chemotherapy       nivolumab 480 mg in sodium chloride 0.9% 148 mL infusion  4/21/2023       Chemotherapy       nivolumab 480 mg in sodium chloride 0.9% 148 mL infusion  5/19/2023       Chemotherapy       nivolumab 480 mg in sodium chloride 0.9% 148 mL infusion       Assessment:   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

## 2023-02-16 DIAGNOSIS — G89.18 POST-OP PAIN: ICD-10-CM

## 2023-02-16 DIAGNOSIS — C43.4 MALIGNANT MELANOMA OF SCALP: Primary | ICD-10-CM

## 2023-02-16 RX ORDER — HYDROCODONE BITARTRATE AND ACETAMINOPHEN 7.5; 325 MG/1; MG/1
1 TABLET ORAL EVERY 6 HOURS PRN
Qty: 28 TABLET | Refills: 0 | Status: SHIPPED | OUTPATIENT
Start: 2023-02-16 | End: 2023-02-23

## 2023-02-23 ENCOUNTER — LAB VISIT (OUTPATIENT)
Dept: LAB | Facility: HOSPITAL | Age: 83
End: 2023-02-23
Attending: INTERNAL MEDICINE
Payer: COMMERCIAL

## 2023-02-23 ENCOUNTER — OFFICE VISIT (OUTPATIENT)
Dept: HEMATOLOGY/ONCOLOGY | Facility: CLINIC | Age: 83
End: 2023-02-23
Payer: COMMERCIAL

## 2023-02-23 VITALS
WEIGHT: 202.63 LBS | OXYGEN SATURATION: 98 % | RESPIRATION RATE: 16 BRPM | DIASTOLIC BLOOD PRESSURE: 66 MMHG | HEIGHT: 73 IN | HEART RATE: 55 BPM | TEMPERATURE: 98 F | BODY MASS INDEX: 26.85 KG/M2 | SYSTOLIC BLOOD PRESSURE: 120 MMHG

## 2023-02-23 DIAGNOSIS — S01.00XA OPEN WOUND OF SCALP, UNSPECIFIED OPEN WOUND TYPE, INITIAL ENCOUNTER: Primary | ICD-10-CM

## 2023-02-23 DIAGNOSIS — C43.4 MALIGNANT MELANOMA OF SCALP: ICD-10-CM

## 2023-02-23 LAB
ALBUMIN SERPL BCP-MCNC: 3.8 G/DL (ref 3.5–5.2)
ALP SERPL-CCNC: 117 U/L (ref 55–135)
ALT SERPL W/O P-5'-P-CCNC: 28 U/L (ref 10–44)
ANION GAP SERPL CALC-SCNC: 8 MMOL/L (ref 8–16)
AST SERPL-CCNC: 24 U/L (ref 10–40)
BASOPHILS # BLD AUTO: 0.05 K/UL (ref 0–0.2)
BASOPHILS NFR BLD: 0.9 % (ref 0–1.9)
BILIRUB SERPL-MCNC: 0.7 MG/DL (ref 0.1–1)
BUN SERPL-MCNC: 15 MG/DL (ref 8–23)
CALCIUM SERPL-MCNC: 9.5 MG/DL (ref 8.7–10.5)
CHLORIDE SERPL-SCNC: 103 MMOL/L (ref 95–110)
CO2 SERPL-SCNC: 28 MMOL/L (ref 23–29)
CREAT SERPL-MCNC: 1.5 MG/DL (ref 0.5–1.4)
DIFFERENTIAL METHOD: ABNORMAL
EOSINOPHIL # BLD AUTO: 0.5 K/UL (ref 0–0.5)
EOSINOPHIL NFR BLD: 8.4 % (ref 0–8)
ERYTHROCYTE [DISTWIDTH] IN BLOOD BY AUTOMATED COUNT: 14 % (ref 11.5–14.5)
EST. GFR  (NO RACE VARIABLE): 46.5 ML/MIN/1.73 M^2
GLUCOSE SERPL-MCNC: 102 MG/DL (ref 70–110)
HCT VFR BLD AUTO: 40.9 % (ref 40–54)
HGB BLD-MCNC: 13.8 G/DL (ref 14–18)
IMM GRANULOCYTES # BLD AUTO: 0.08 K/UL (ref 0–0.04)
IMM GRANULOCYTES NFR BLD AUTO: 1.4 % (ref 0–0.5)
LYMPHOCYTES # BLD AUTO: 1.2 K/UL (ref 1–4.8)
LYMPHOCYTES NFR BLD: 21 % (ref 18–48)
MCH RBC QN AUTO: 33.3 PG (ref 27–31)
MCHC RBC AUTO-ENTMCNC: 33.7 G/DL (ref 32–36)
MCV RBC AUTO: 99 FL (ref 82–98)
MONOCYTES # BLD AUTO: 0.6 K/UL (ref 0.3–1)
MONOCYTES NFR BLD: 10.8 % (ref 4–15)
NEUTROPHILS # BLD AUTO: 3.4 K/UL (ref 1.8–7.7)
NEUTROPHILS NFR BLD: 57.5 % (ref 38–73)
NRBC BLD-RTO: 0 /100 WBC
PLATELET # BLD AUTO: 194 K/UL (ref 150–450)
PMV BLD AUTO: 11.2 FL (ref 9.2–12.9)
POTASSIUM SERPL-SCNC: 4.1 MMOL/L (ref 3.5–5.1)
PROT SERPL-MCNC: 6.5 G/DL (ref 6–8.4)
RBC # BLD AUTO: 4.14 M/UL (ref 4.6–6.2)
SODIUM SERPL-SCNC: 139 MMOL/L (ref 136–145)
TSH SERPL DL<=0.005 MIU/L-ACNC: 0.87 UIU/ML (ref 0.4–4)
WBC # BLD AUTO: 5.86 K/UL (ref 3.9–12.7)

## 2023-02-23 PROCEDURE — 1101F PT FALLS ASSESS-DOCD LE1/YR: CPT | Mod: CPTII,S$GLB,, | Performed by: OTOLARYNGOLOGY

## 2023-02-23 PROCEDURE — 3078F DIAST BP <80 MM HG: CPT | Mod: CPTII,S$GLB,, | Performed by: OTOLARYNGOLOGY

## 2023-02-23 PROCEDURE — 3078F PR MOST RECENT DIASTOLIC BLOOD PRESSURE < 80 MM HG: ICD-10-PCS | Mod: CPTII,S$GLB,, | Performed by: OTOLARYNGOLOGY

## 2023-02-23 PROCEDURE — 1159F MED LIST DOCD IN RCRD: CPT | Mod: CPTII,S$GLB,, | Performed by: OTOLARYNGOLOGY

## 2023-02-23 PROCEDURE — 80053 COMPREHEN METABOLIC PANEL: CPT | Mod: PN | Performed by: INTERNAL MEDICINE

## 2023-02-23 PROCEDURE — 1160F PR REVIEW ALL MEDS BY PRESCRIBER/CLIN PHARMACIST DOCUMENTED: ICD-10-PCS | Mod: CPTII,S$GLB,, | Performed by: OTOLARYNGOLOGY

## 2023-02-23 PROCEDURE — 1160F RVW MEDS BY RX/DR IN RCRD: CPT | Mod: CPTII,S$GLB,, | Performed by: OTOLARYNGOLOGY

## 2023-02-23 PROCEDURE — 1159F PR MEDICATION LIST DOCUMENTED IN MEDICAL RECORD: ICD-10-PCS | Mod: CPTII,S$GLB,, | Performed by: OTOLARYNGOLOGY

## 2023-02-23 PROCEDURE — 84443 ASSAY THYROID STIM HORMONE: CPT | Performed by: INTERNAL MEDICINE

## 2023-02-23 PROCEDURE — 99024 PR POST-OP FOLLOW-UP VISIT: ICD-10-PCS | Mod: S$GLB,,, | Performed by: OTOLARYNGOLOGY

## 2023-02-23 PROCEDURE — 3074F SYST BP LT 130 MM HG: CPT | Mod: CPTII,S$GLB,, | Performed by: OTOLARYNGOLOGY

## 2023-02-23 PROCEDURE — 85025 COMPLETE CBC W/AUTO DIFF WBC: CPT | Mod: PN | Performed by: INTERNAL MEDICINE

## 2023-02-23 PROCEDURE — 99024 POSTOP FOLLOW-UP VISIT: CPT | Mod: S$GLB,,, | Performed by: OTOLARYNGOLOGY

## 2023-02-23 PROCEDURE — 99999 PR PBB SHADOW E&M-EST. PATIENT-LVL IV: CPT | Mod: PBBFAC,,, | Performed by: OTOLARYNGOLOGY

## 2023-02-23 PROCEDURE — 3288F FALL RISK ASSESSMENT DOCD: CPT | Mod: CPTII,S$GLB,, | Performed by: OTOLARYNGOLOGY

## 2023-02-23 PROCEDURE — 36415 COLL VENOUS BLD VENIPUNCTURE: CPT | Mod: PN | Performed by: INTERNAL MEDICINE

## 2023-02-23 PROCEDURE — 3074F PR MOST RECENT SYSTOLIC BLOOD PRESSURE < 130 MM HG: ICD-10-PCS | Mod: CPTII,S$GLB,, | Performed by: OTOLARYNGOLOGY

## 2023-02-23 PROCEDURE — 1101F PR PT FALLS ASSESS DOC 0-1 FALLS W/OUT INJ PAST YR: ICD-10-PCS | Mod: CPTII,S$GLB,, | Performed by: OTOLARYNGOLOGY

## 2023-02-23 PROCEDURE — 1126F PR PAIN SEVERITY QUANTIFIED, NO PAIN PRESENT: ICD-10-PCS | Mod: CPTII,S$GLB,, | Performed by: OTOLARYNGOLOGY

## 2023-02-23 PROCEDURE — 99999 PR PBB SHADOW E&M-EST. PATIENT-LVL IV: ICD-10-PCS | Mod: PBBFAC,,, | Performed by: OTOLARYNGOLOGY

## 2023-02-23 PROCEDURE — 1126F AMNT PAIN NOTED NONE PRSNT: CPT | Mod: CPTII,S$GLB,, | Performed by: OTOLARYNGOLOGY

## 2023-02-23 PROCEDURE — 3288F PR FALLS RISK ASSESSMENT DOCUMENTED: ICD-10-PCS | Mod: CPTII,S$GLB,, | Performed by: OTOLARYNGOLOGY

## 2023-02-23 NOTE — PROGRESS NOTES
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  Patient presented about 3-4 months ago scalp. Tneder to touch. NO bleeding. No pruritis. Applying triple antibiotic S/P biopsy    Patient denies numbness and weakness of his face.  He denies parotid and neck masses.    He has no previous history of melanoma or non melanoma skin cancer and no family history of melanoma.  Hx of sunburns as a child/young adult.    11/3/2022  Patient is here today follow-up biopsy results and imaging studies.  He has no new complaints.    11/28/2022  Patient is here today for biopsy results of a possible metastasis and to discuss treatment planning.  FNA was negative for metastatic lesion.  Patient has no new complaints.    12/15/2022  Patient is here today for path and bolster removal.  He has no complaints.  He has had very minimal pain    1/5/2023  Patient is here today for wound check.  He is status post wide local excision melanoma of the scalp was sentinel lymph node biopsy.  Patient has positive margins and a satellite lesion.  After his case was presented at multidisciplinary head & neck tumor board it was decided to treat the patient with neoadjuvant immunotherapy followed by resection followed by a year of immunotherapy.    Patient has no complaints    2/14/2023  Patient is here today to discuss surgery.  He is status post wide local excision melanoma of the scalp was sentinel lymph node biopsy.  Tumor Board recommendations were that patient will be treated with neoadjuvant immunotherapy followed by resection followed by a year of immunotherapy (see plan below)    Patient has no complaints.    2/23/2023  Patient is here today for  pathology results status post re-excision of positive margins melanoma of the scalp.    He has no new complaints.  Patient's next dose of immunotherapy tomorrow.    ROS: see HPI  Constitutional: Negative for activity change and appetite change, weight loss.   Eyes: Negative for discharge, visual changes.   Respiratory: Negative for difficulty breathing and wheezing   Cardiovascular: Negative for chest pain.   Gastrointestinal: Negative for abdominal distention and abdominal pain.   Endocrine: Negative for cold intolerance and heat intolerance.   Genitourinary: Negative for dysuria.   Musculoskeletal: Negative for gait problem, muscle pain and joint swelling.   Skin: Negative for color change and pallor; negative for skin lesions.   Neurological: Negative for syncope and weakness; no numbness face.   Psychiatric/Behavioral: Negative for agitation and confusion; negative for depression.    Physical Exam:      Constitutional  General Appearance: well nourished, well-developed, alert, oriented, in no acute distress  Communication: ability, understanding, normal  Head and Face  Inspection: normocephalic, bolster removed; no bleeding; wound bed clean  Palpation: no stepoffs, sinus tenderness or masses  Parotid glands: no masses, stones, swelling or tenderness  Neurological  Cranial Nerves: grossly intact  General: no focal deficits    Re-excision positive margins (S/P 2 courses of immunotherapy)  1,2. SKIN AND SUBCUTANEOUS TISSUE OF SCALP VERTEX, WIDE EXCISION    RESECTION SITE AND 6 - 7 0'CLOCK MARGIN:   - NO RESIDUAL TUMOR SEEN.   - SURGICAL REPAIR REACTION.       Excision  SKIN, VERTEX SCALP, WIDE LOCAL EXCISION   - SPINDLE CELL MELANOMA   - PRESENT AT DEEP MARGIN   - MICROSATELLITE PRESET AT 12-3 O'CLOCK PERIPHERAL MARGIN     Comment:   Immunohistochemistry was performed with appropriate controls on block  1D based on the histopathologic findings and the results are  summarized as follows:   MelanA: Highlights  melanocytes   Discussed with Dr. Radford on 12/13/22.     BRAF mutation testing has been ordered and the results will be reported    separately.     MELANOMA: SYNOPTIC REPORT   PROCEDURE:   Wide local excision   SPECIMEN LATERALITY: Vertex scalp   TUMOR SITE: Vertex scalp   GROSS TUMOR SIZE: 17 mm   MACROSCOPIC SATELLITE NODULE(S): Not identified   HISTOLOGIC TYPE: Spindle cell melanoma   SURGICAL MARGIN STATUS:   Invasive melanoma present at deep margin and 12-3:00 margin.   Margins negative for melanoma in-situ (2 mm to 3-6:00 margin)   MEASURED BRESLOW THICKNESS: 10.5 mm   DERMAL MITOTIC RATE (PER SQUARE MM): 12 mitoses per square mm   ULCERATION: Negative   GROWTH PHASE: Vertical   LEVEL  OF   INVASION (KEITH'S): Keith level V   MICROSATELLITOSIS: Present   NEUROTROPISM: Present   LYMPHOVASCULAR INVASION: Negative   TUMOR INFILTRATING LYMPHOCYTES: Present, non-brisk   TUMOR REGRESSION Negative   ASSOCIATED NEVUS Negative   IMMUNOHISTOCHEMICAL STUDIES: HMB45   IN-TRANSIT METASTASIS Negative   ADDITIONAL FINDINGS:   Not applicable   TNM CODE: pT4a pN1c (microsatellite with no known regional lymph node disease)     Records reviewed:   Med Onc Real Hart MD      Treatment Plan Information   OP NIVOLUMAB 3MG/KG IPILIMUMAB 1MG/KG FOLLOWED BY NIVOLUMAB 480MG Q4W   Real Hart MD   Upcoming Treatment Dates - OP NIVOLUMAB 3MG/KG IPILIMUMAB 1MG/KG FOLLOWED BY NIVOLUMAB 480MG Q4W     2/24/2023       Chemotherapy       nivolumab 480 mg in sodium chloride 0.9% 148 mL infusion  3/24/2023       Chemotherapy       nivolumab 480 mg in sodium chloride 0.9% 148 mL infusion  4/21/2023       Chemotherapy       nivolumab 480 mg in sodium chloride 0.9% 148 mL infusion  5/19/2023       Chemotherapy       nivolumab 480 mg in sodium chloride 0.9% 148 mL infusion       Assessment:   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  Will allow the wound bed to granulate to some extent prior to placing a split-thickness skin graft  He will follow-up with me in 2 weeks and OR 3/30/2023
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
## 2023-02-24 ENCOUNTER — DOCUMENTATION ONLY (OUTPATIENT)
Dept: INFUSION THERAPY | Facility: HOSPITAL | Age: 83
End: 2023-02-24

## 2023-02-24 ENCOUNTER — INFUSION (OUTPATIENT)
Dept: INFUSION THERAPY | Facility: HOSPITAL | Age: 83
End: 2023-02-24
Attending: INTERNAL MEDICINE
Payer: COMMERCIAL

## 2023-02-24 ENCOUNTER — OFFICE VISIT (OUTPATIENT)
Dept: HEMATOLOGY/ONCOLOGY | Facility: CLINIC | Age: 83
End: 2023-02-24
Payer: COMMERCIAL

## 2023-02-24 VITALS
WEIGHT: 202.38 LBS | DIASTOLIC BLOOD PRESSURE: 48 MMHG | HEIGHT: 73 IN | RESPIRATION RATE: 16 BRPM | SYSTOLIC BLOOD PRESSURE: 131 MMHG | TEMPERATURE: 97 F | HEART RATE: 55 BPM | OXYGEN SATURATION: 99 % | BODY MASS INDEX: 26.82 KG/M2

## 2023-02-24 VITALS
HEIGHT: 73 IN | BODY MASS INDEX: 26.82 KG/M2 | OXYGEN SATURATION: 99 % | WEIGHT: 202.38 LBS | TEMPERATURE: 97 F | HEART RATE: 84 BPM | SYSTOLIC BLOOD PRESSURE: 93 MMHG | DIASTOLIC BLOOD PRESSURE: 54 MMHG

## 2023-02-24 DIAGNOSIS — C43.4 MALIGNANT MELANOMA OF SCALP: Primary | ICD-10-CM

## 2023-02-24 DIAGNOSIS — Z79.899 IMMUNODEFICIENCY DUE TO DRUGS: ICD-10-CM

## 2023-02-24 DIAGNOSIS — D84.821 IMMUNODEFICIENCY DUE TO DRUGS: ICD-10-CM

## 2023-02-24 DIAGNOSIS — I48.0 PAROXYSMAL ATRIAL FIBRILLATION: ICD-10-CM

## 2023-02-24 DIAGNOSIS — I10 PRIMARY HYPERTENSION: ICD-10-CM

## 2023-02-24 DIAGNOSIS — C43.9 SKIN CANCER (MELANOMA): ICD-10-CM

## 2023-02-24 PROCEDURE — 3288F FALL RISK ASSESSMENT DOCD: CPT | Mod: CPTII,S$GLB,, | Performed by: INTERNAL MEDICINE

## 2023-02-24 PROCEDURE — 1101F PR PT FALLS ASSESS DOC 0-1 FALLS W/OUT INJ PAST YR: ICD-10-PCS | Mod: CPTII,S$GLB,, | Performed by: INTERNAL MEDICINE

## 2023-02-24 PROCEDURE — 99215 PR OFFICE/OUTPT VISIT, EST, LEVL V, 40-54 MIN: ICD-10-PCS | Mod: S$GLB,,, | Performed by: INTERNAL MEDICINE

## 2023-02-24 PROCEDURE — 1126F PR PAIN SEVERITY QUANTIFIED, NO PAIN PRESENT: ICD-10-PCS | Mod: CPTII,S$GLB,, | Performed by: INTERNAL MEDICINE

## 2023-02-24 PROCEDURE — 99215 OFFICE O/P EST HI 40 MIN: CPT | Mod: S$GLB,,, | Performed by: INTERNAL MEDICINE

## 2023-02-24 PROCEDURE — 63600175 PHARM REV CODE 636 W HCPCS: Mod: JG,PN | Performed by: INTERNAL MEDICINE

## 2023-02-24 PROCEDURE — 96413 CHEMO IV INFUSION 1 HR: CPT | Mod: PN

## 2023-02-24 PROCEDURE — 99999 PR PBB SHADOW E&M-EST. PATIENT-LVL IV: ICD-10-PCS | Mod: PBBFAC,,, | Performed by: INTERNAL MEDICINE

## 2023-02-24 PROCEDURE — 3074F SYST BP LT 130 MM HG: CPT | Mod: CPTII,S$GLB,, | Performed by: INTERNAL MEDICINE

## 2023-02-24 PROCEDURE — 3288F PR FALLS RISK ASSESSMENT DOCUMENTED: ICD-10-PCS | Mod: CPTII,S$GLB,, | Performed by: INTERNAL MEDICINE

## 2023-02-24 PROCEDURE — 99999 PR PBB SHADOW E&M-EST. PATIENT-LVL IV: CPT | Mod: PBBFAC,,, | Performed by: INTERNAL MEDICINE

## 2023-02-24 PROCEDURE — 3078F PR MOST RECENT DIASTOLIC BLOOD PRESSURE < 80 MM HG: ICD-10-PCS | Mod: CPTII,S$GLB,, | Performed by: INTERNAL MEDICINE

## 2023-02-24 PROCEDURE — 25000003 PHARM REV CODE 250: Mod: PN | Performed by: INTERNAL MEDICINE

## 2023-02-24 PROCEDURE — 3074F PR MOST RECENT SYSTOLIC BLOOD PRESSURE < 130 MM HG: ICD-10-PCS | Mod: CPTII,S$GLB,, | Performed by: INTERNAL MEDICINE

## 2023-02-24 PROCEDURE — 3078F DIAST BP <80 MM HG: CPT | Mod: CPTII,S$GLB,, | Performed by: INTERNAL MEDICINE

## 2023-02-24 PROCEDURE — 1101F PT FALLS ASSESS-DOCD LE1/YR: CPT | Mod: CPTII,S$GLB,, | Performed by: INTERNAL MEDICINE

## 2023-02-24 PROCEDURE — 1126F AMNT PAIN NOTED NONE PRSNT: CPT | Mod: CPTII,S$GLB,, | Performed by: INTERNAL MEDICINE

## 2023-02-24 RX ORDER — SODIUM CHLORIDE 0.9 % (FLUSH) 0.9 %
10 SYRINGE (ML) INJECTION
Status: CANCELLED | OUTPATIENT
Start: 2023-02-24

## 2023-02-24 RX ORDER — HEPARIN 100 UNIT/ML
500 SYRINGE INTRAVENOUS
Status: CANCELLED | OUTPATIENT
Start: 2023-02-24

## 2023-02-24 RX ADMIN — SODIUM CHLORIDE 480 MG: 9 INJECTION, SOLUTION INTRAVENOUS at 10:02

## 2023-02-24 RX ADMIN — SODIUM CHLORIDE: 9 INJECTION, SOLUTION INTRAVENOUS at 10:02

## 2023-02-24 NOTE — PLAN OF CARE
Pt tolerated Opdivo infusion well.  No s/s of infusion reaction noted.  Instructed to call MD with any problems

## 2023-02-24 NOTE — PROGRESS NOTES
Oncology Nutrition   Chemotherapy Infusion Visit    Nutrition Follow Up   RD met with patient and his wife at chairside during infusion treatment. Pt reports continues to do well nutritionally- eating without difficulty, maintaining weight, and denies nutrition related side effects.    RD provided pt with gas card per Kya NASH.    Wt Readings from Last 10 Encounters:   02/24/23 91.8 kg (202 lb 6.1 oz)   02/24/23 91.8 kg (202 lb 6.1 oz)   02/23/23 91.9 kg (202 lb 9.6 oz)   02/14/23 92.4 kg (203 lb 11.3 oz)   02/09/23 93 kg (205 lb 0.4 oz)   01/27/23 92.8 kg (204 lb 9.4 oz)   01/27/23 92.8 kg (204 lb 9.4 oz)   01/06/23 93.3 kg (205 lb 11 oz)   01/06/23 93.3 kg (205 lb 11 oz)   01/05/23 93.9 kg (207 lb 0.2 oz)       All other nutrition questions/concerns addressed as appropriate. Will continue to follow and monitor throughout treatment PRN.     Zaira Welch, MS, RD, LDN  02/24/2023  2:10 PM

## 2023-02-27 ENCOUNTER — TELEPHONE (OUTPATIENT)
Dept: HEMATOLOGY/ONCOLOGY | Facility: CLINIC | Age: 83
End: 2023-02-27
Payer: COMMERCIAL

## 2023-03-09 ENCOUNTER — OFFICE VISIT (OUTPATIENT)
Dept: HEMATOLOGY/ONCOLOGY | Facility: CLINIC | Age: 83
End: 2023-03-09
Payer: COMMERCIAL

## 2023-03-09 VITALS
HEIGHT: 73 IN | WEIGHT: 203.94 LBS | DIASTOLIC BLOOD PRESSURE: 48 MMHG | BODY MASS INDEX: 27.03 KG/M2 | TEMPERATURE: 97 F | RESPIRATION RATE: 18 BRPM | HEART RATE: 56 BPM | OXYGEN SATURATION: 100 % | SYSTOLIC BLOOD PRESSURE: 113 MMHG

## 2023-03-09 DIAGNOSIS — C43.4 MALIGNANT MELANOMA OF SCALP: Primary | ICD-10-CM

## 2023-03-09 DIAGNOSIS — S01.00XA OPEN WOUND OF SCALP, UNSPECIFIED OPEN WOUND TYPE, INITIAL ENCOUNTER: ICD-10-CM

## 2023-03-09 PROCEDURE — 3074F PR MOST RECENT SYSTOLIC BLOOD PRESSURE < 130 MM HG: ICD-10-PCS | Mod: CPTII,S$GLB,, | Performed by: OTOLARYNGOLOGY

## 2023-03-09 PROCEDURE — 1126F AMNT PAIN NOTED NONE PRSNT: CPT | Mod: CPTII,S$GLB,, | Performed by: OTOLARYNGOLOGY

## 2023-03-09 PROCEDURE — 99024 PR POST-OP FOLLOW-UP VISIT: ICD-10-PCS | Mod: S$GLB,,, | Performed by: OTOLARYNGOLOGY

## 2023-03-09 PROCEDURE — 99999 PR PBB SHADOW E&M-EST. PATIENT-LVL IV: CPT | Mod: PBBFAC,,, | Performed by: OTOLARYNGOLOGY

## 2023-03-09 PROCEDURE — 1126F PR PAIN SEVERITY QUANTIFIED, NO PAIN PRESENT: ICD-10-PCS | Mod: CPTII,S$GLB,, | Performed by: OTOLARYNGOLOGY

## 2023-03-09 PROCEDURE — 99999 PR PBB SHADOW E&M-EST. PATIENT-LVL IV: ICD-10-PCS | Mod: PBBFAC,,, | Performed by: OTOLARYNGOLOGY

## 2023-03-09 PROCEDURE — 1159F PR MEDICATION LIST DOCUMENTED IN MEDICAL RECORD: ICD-10-PCS | Mod: CPTII,S$GLB,, | Performed by: OTOLARYNGOLOGY

## 2023-03-09 PROCEDURE — 3074F SYST BP LT 130 MM HG: CPT | Mod: CPTII,S$GLB,, | Performed by: OTOLARYNGOLOGY

## 2023-03-09 PROCEDURE — 3078F DIAST BP <80 MM HG: CPT | Mod: CPTII,S$GLB,, | Performed by: OTOLARYNGOLOGY

## 2023-03-09 PROCEDURE — 99024 POSTOP FOLLOW-UP VISIT: CPT | Mod: S$GLB,,, | Performed by: OTOLARYNGOLOGY

## 2023-03-09 PROCEDURE — 1159F MED LIST DOCD IN RCRD: CPT | Mod: CPTII,S$GLB,, | Performed by: OTOLARYNGOLOGY

## 2023-03-09 PROCEDURE — 3078F PR MOST RECENT DIASTOLIC BLOOD PRESSURE < 80 MM HG: ICD-10-PCS | Mod: CPTII,S$GLB,, | Performed by: OTOLARYNGOLOGY

## 2023-03-09 NOTE — PROGRESS NOTES
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  Patient presented about 3-4 months ago scalp. Tneder to touch. NO bleeding. No pruritis. Applying triple antibiotic S/P biopsy    Patient denies numbness and weakness of his face.  He denies parotid and neck masses.    He has no previous history of melanoma or non melanoma skin cancer and no family history of melanoma.  Hx of sunburns as a child/young adult.    11/3/2022  Patient is here today follow-up biopsy results and imaging studies.  He has no new complaints.    11/28/2022  Patient is here today for biopsy results of a possible metastasis and to discuss treatment planning.  FNA was negative for metastatic lesion.  Patient has no new complaints.    12/15/2022  Patient is here today for path and bolster removal.  He has no complaints.  He has had very minimal pain    1/5/2023  Patient is here today for wound check.  He is status post wide local excision melanoma of the scalp was sentinel lymph node biopsy.  Patient has positive margins and a satellite lesion.  After his case was presented at multidisciplinary head & neck tumor board it was decided to treat the patient with neoadjuvant immunotherapy followed by resection followed by a year of immunotherapy.    Patient has no complaints    2/14/2023  Patient is here today to discuss surgery.  He is status post wide local excision melanoma of the scalp was sentinel lymph node biopsy.  Tumor Board recommendations were that patient will be treated with neoadjuvant immunotherapy followed by resection followed by a year of immunotherapy (see plan below)    Patient has no complaints.    2/23/2023  Patient is here today for  pathology results status post re-excision of positive margins melanoma of the scalp.    He has no new complaints.  Patient's next dose of immunotherapy tomorrow.    3/9/2023  Patient is here today for wound check.  He has no new complaints.  He is tolerating immunotherapy with a minor rash.    ROS: see HPI  Constitutional: Negative for activity change and appetite change, weight loss.   Eyes: Negative for discharge, visual changes.   Respiratory: Negative for difficulty breathing and wheezing   Cardiovascular: Negative for chest pain.   Gastrointestinal: Negative for abdominal distention and abdominal pain.   Endocrine: Negative for cold intolerance and heat intolerance.   Genitourinary: Negative for dysuria.   Musculoskeletal: Negative for gait problem, muscle pain and joint swelling.   Skin: Negative for color change and pallor; negative for skin lesions.   Neurological: Negative for syncope and weakness; no numbness face.   Psychiatric/Behavioral: Negative for agitation and confusion; negative for depression.    Physical Exam:      Constitutional  General Appearance: well nourished, well-developed, alert, oriented, in no acute distress  Communication: ability, understanding, normal  Head and Face  Inspection: normocephalic, large scalp defect-granulating; clean  Palpation: no stepoffs, sinus tenderness or masses  Parotid glands: no masses, stones, swelling or tenderness    Re-excision positive margins (S/P 2 courses of immunotherapy)  1,2. SKIN AND SUBCUTANEOUS TISSUE OF SCALP VERTEX, WIDE EXCISION    RESECTION SITE AND 6 - 7 0'CLOCK MARGIN:   - NO RESIDUAL TUMOR SEEN.   - SURGICAL REPAIR REACTION.       Excision  SKIN, VERTEX SCALP, WIDE LOCAL EXCISION   - SPINDLE CELL MELANOMA   - PRESENT AT DEEP MARGIN   - MICROSATELLITE PRESET AT 12-3 O'CLOCK PERIPHERAL MARGIN     Comment:   Immunohistochemistry was performed with appropriate controls on block  1D based on the histopathologic findings and the results are   summarized as follows:   MelanA: Highlights melanocytes   Discussed with Dr. Radford on 12/13/22.     BRAF mutation testing has been ordered and the results will be reported    separately.     MELANOMA: SYNOPTIC REPORT   PROCEDURE:   Wide local excision   SPECIMEN LATERALITY: Vertex scalp   TUMOR SITE: Vertex scalp   GROSS TUMOR SIZE: 17 mm   MACROSCOPIC SATELLITE NODULE(S): Not identified   HISTOLOGIC TYPE: Spindle cell melanoma   SURGICAL MARGIN STATUS:   Invasive melanoma present at deep margin and 12-3:00 margin.   Margins negative for melanoma in-situ (2 mm to 3-6:00 margin)   MEASURED BRESLOW THICKNESS: 10.5 mm   DERMAL MITOTIC RATE (PER SQUARE MM): 12 mitoses per square mm   ULCERATION: Negative   GROWTH PHASE: Vertical   LEVEL  OF   INVASION (KEITH'S): Keith level V   MICROSATELLITOSIS: Present   NEUROTROPISM: Present   LYMPHOVASCULAR INVASION: Negative   TUMOR INFILTRATING LYMPHOCYTES: Present, non-brisk   TUMOR REGRESSION Negative   ASSOCIATED NEVUS Negative   IMMUNOHISTOCHEMICAL STUDIES: HMB45   IN-TRANSIT METASTASIS Negative   ADDITIONAL FINDINGS:   Not applicable   TNM CODE: pT4a pN1c (microsatellite with no known regional lymph node disease)     Records reviewed:   Med Onc Real Hart MD     Med Onc Chart Routing        Follow up with physician 4 weeks. Pt can proceed with treatment.  He needs a PET/CT 3/23/23 weeks.  He needs an appt with me 3/23/23 with CBC, CMP and TSH prior to the appt.  His treatment will be on 3/24/23.        Treatment Plan Information   OP NIVOLUMAB 3MG/KG IPILIMUMAB 1MG/KG FOLLOWED BY NIVOLUMAB 480MG Q4W   Real Hart MD   Upcoming Treatment Dates - OP NIVOLUMAB 3MG/KG IPILIMUMAB 1MG/KG FOLLOWED BY NIVOLUMAB 480MG Q4W     2/24/2023       Chemotherapy       nivolumab 480 mg in sodium chloride 0.9% 148 mL infusion  3/24/2023       Chemotherapy       nivolumab 480 mg in sodium chloride 0.9% 148 mL infusion  4/21/2023       Chemotherapy       nivolumab 480 mg in sodium chloride  0.9% 148 mL infusion  5/19/2023       Chemotherapy       nivolumab 480 mg in sodium chloride 0.9% 148 mL infusion       Assessment:   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

## 2023-03-21 ENCOUNTER — HOSPITAL ENCOUNTER (OUTPATIENT)
Dept: RADIOLOGY | Facility: HOSPITAL | Age: 83
Discharge: HOME | End: 2023-03-21
Attending: INTERNAL MEDICINE
Payer: COMMERCIAL

## 2023-03-21 DIAGNOSIS — C43.9 SKIN CANCER (MELANOMA): ICD-10-CM

## 2023-03-21 LAB — GLUCOSE SERPL-MCNC: 118 MG/DL (ref 70–110)

## 2023-03-21 PROCEDURE — A9552 F18 FDG: HCPCS | Mod: PN

## 2023-03-21 PROCEDURE — 78816 PET IMAGE W/CT FULL BODY: CPT | Mod: TC,PN

## 2023-03-21 PROCEDURE — 78816 PET IMAGE W/CT FULL BODY: CPT | Mod: 26,PS,, | Performed by: RADIOLOGY

## 2023-03-21 PROCEDURE — 78816 NM PET CT WHOLE BODY: ICD-10-PCS | Mod: 26,PS,, | Performed by: RADIOLOGY
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
## 2023-03-23 ENCOUNTER — OFFICE VISIT (OUTPATIENT)
Dept: HEMATOLOGY/ONCOLOGY | Facility: CLINIC | Age: 83
End: 2023-03-23
Payer: COMMERCIAL

## 2023-03-23 ENCOUNTER — DOCUMENTATION ONLY (OUTPATIENT)
Dept: INFUSION THERAPY | Facility: HOSPITAL | Age: 83
End: 2023-03-23

## 2023-03-23 ENCOUNTER — INFUSION (OUTPATIENT)
Dept: INFUSION THERAPY | Facility: HOSPITAL | Age: 83
End: 2023-03-23
Attending: INTERNAL MEDICINE
Payer: COMMERCIAL

## 2023-03-23 VITALS
TEMPERATURE: 98 F | HEART RATE: 57 BPM | WEIGHT: 202.63 LBS | RESPIRATION RATE: 18 BRPM | BODY MASS INDEX: 26.85 KG/M2 | DIASTOLIC BLOOD PRESSURE: 59 MMHG | SYSTOLIC BLOOD PRESSURE: 111 MMHG | HEIGHT: 73 IN

## 2023-03-23 VITALS
WEIGHT: 202.63 LBS | DIASTOLIC BLOOD PRESSURE: 60 MMHG | HEART RATE: 62 BPM | BODY MASS INDEX: 26.85 KG/M2 | OXYGEN SATURATION: 96 % | SYSTOLIC BLOOD PRESSURE: 122 MMHG | TEMPERATURE: 97 F | HEIGHT: 73 IN

## 2023-03-23 DIAGNOSIS — R22.9 SUBCUTANEOUS NODULE: ICD-10-CM

## 2023-03-23 DIAGNOSIS — C43.4 MALIGNANT MELANOMA OF SCALP: Primary | ICD-10-CM

## 2023-03-23 DIAGNOSIS — I48.0 PAROXYSMAL ATRIAL FIBRILLATION: ICD-10-CM

## 2023-03-23 DIAGNOSIS — Z79.899 IMMUNODEFICIENCY DUE TO DRUGS: ICD-10-CM

## 2023-03-23 DIAGNOSIS — I10 PRIMARY HYPERTENSION: ICD-10-CM

## 2023-03-23 DIAGNOSIS — C43.9 SKIN CANCER (MELANOMA): ICD-10-CM

## 2023-03-23 DIAGNOSIS — D84.821 IMMUNODEFICIENCY DUE TO DRUGS: ICD-10-CM

## 2023-03-23 PROCEDURE — 3288F PR FALLS RISK ASSESSMENT DOCUMENTED: ICD-10-PCS | Mod: CPTII,S$GLB,, | Performed by: INTERNAL MEDICINE

## 2023-03-23 PROCEDURE — 3078F PR MOST RECENT DIASTOLIC BLOOD PRESSURE < 80 MM HG: ICD-10-PCS | Mod: CPTII,S$GLB,, | Performed by: INTERNAL MEDICINE

## 2023-03-23 PROCEDURE — 1159F MED LIST DOCD IN RCRD: CPT | Mod: CPTII,S$GLB,, | Performed by: INTERNAL MEDICINE

## 2023-03-23 PROCEDURE — 1126F PR PAIN SEVERITY QUANTIFIED, NO PAIN PRESENT: ICD-10-PCS | Mod: CPTII,S$GLB,, | Performed by: INTERNAL MEDICINE

## 2023-03-23 PROCEDURE — 99999 PR PBB SHADOW E&M-EST. PATIENT-LVL III: ICD-10-PCS | Mod: PBBFAC,,, | Performed by: INTERNAL MEDICINE

## 2023-03-23 PROCEDURE — 1101F PT FALLS ASSESS-DOCD LE1/YR: CPT | Mod: CPTII,S$GLB,, | Performed by: INTERNAL MEDICINE

## 2023-03-23 PROCEDURE — 1159F PR MEDICATION LIST DOCUMENTED IN MEDICAL RECORD: ICD-10-PCS | Mod: CPTII,S$GLB,, | Performed by: INTERNAL MEDICINE

## 2023-03-23 PROCEDURE — 3078F DIAST BP <80 MM HG: CPT | Mod: CPTII,S$GLB,, | Performed by: INTERNAL MEDICINE

## 2023-03-23 PROCEDURE — 99999 PR PBB SHADOW E&M-EST. PATIENT-LVL III: CPT | Mod: PBBFAC,,, | Performed by: INTERNAL MEDICINE

## 2023-03-23 PROCEDURE — 1126F AMNT PAIN NOTED NONE PRSNT: CPT | Mod: CPTII,S$GLB,, | Performed by: INTERNAL MEDICINE

## 2023-03-23 PROCEDURE — 99215 PR OFFICE/OUTPT VISIT, EST, LEVL V, 40-54 MIN: ICD-10-PCS | Mod: S$GLB,,, | Performed by: INTERNAL MEDICINE

## 2023-03-23 PROCEDURE — 96413 CHEMO IV INFUSION 1 HR: CPT | Mod: PN

## 2023-03-23 PROCEDURE — 3074F SYST BP LT 130 MM HG: CPT | Mod: CPTII,S$GLB,, | Performed by: INTERNAL MEDICINE

## 2023-03-23 PROCEDURE — 99215 OFFICE O/P EST HI 40 MIN: CPT | Mod: S$GLB,,, | Performed by: INTERNAL MEDICINE

## 2023-03-23 PROCEDURE — 1101F PR PT FALLS ASSESS DOC 0-1 FALLS W/OUT INJ PAST YR: ICD-10-PCS | Mod: CPTII,S$GLB,, | Performed by: INTERNAL MEDICINE

## 2023-03-23 PROCEDURE — 3074F PR MOST RECENT SYSTOLIC BLOOD PRESSURE < 130 MM HG: ICD-10-PCS | Mod: CPTII,S$GLB,, | Performed by: INTERNAL MEDICINE

## 2023-03-23 PROCEDURE — 3288F FALL RISK ASSESSMENT DOCD: CPT | Mod: CPTII,S$GLB,, | Performed by: INTERNAL MEDICINE

## 2023-03-23 PROCEDURE — 25000003 PHARM REV CODE 250: Mod: PN | Performed by: INTERNAL MEDICINE

## 2023-03-23 PROCEDURE — 63600175 PHARM REV CODE 636 W HCPCS: Mod: JZ,JG,PN | Performed by: INTERNAL MEDICINE

## 2023-03-23 RX ORDER — HEPARIN 100 UNIT/ML
500 SYRINGE INTRAVENOUS
Status: CANCELLED | OUTPATIENT
Start: 2023-03-23

## 2023-03-23 RX ORDER — SODIUM CHLORIDE 0.9 % (FLUSH) 0.9 %
10 SYRINGE (ML) INJECTION
Status: DISCONTINUED | OUTPATIENT
Start: 2023-03-23 | End: 2023-03-23 | Stop reason: HOSPADM

## 2023-03-23 RX ORDER — HEPARIN 100 UNIT/ML
500 SYRINGE INTRAVENOUS
Status: DISCONTINUED | OUTPATIENT
Start: 2023-03-23 | End: 2023-03-23 | Stop reason: HOSPADM

## 2023-03-23 RX ORDER — SODIUM CHLORIDE 0.9 % (FLUSH) 0.9 %
10 SYRINGE (ML) INJECTION
Status: CANCELLED | OUTPATIENT
Start: 2023-03-23

## 2023-03-23 RX ADMIN — SODIUM CHLORIDE 480 MG: 9 INJECTION, SOLUTION INTRAVENOUS at 12:03

## 2023-03-23 RX ADMIN — SODIUM CHLORIDE: 9 INJECTION, SOLUTION INTRAVENOUS at 12:03

## 2023-03-23 NOTE — PLAN OF CARE
Tolerated opdivo well.  No reactions noted.  No questions or concerns at this time.  Ambulated off unit in NAD.

## 2023-03-23 NOTE — PROGRESS NOTES
3:46 PM    This [facility] met this pt briefly to provide a gas card.   The pt's wife shared her gratitude since they drive so far for treatment.  The pt reported no other needs at this time.

## 2023-03-30 DIAGNOSIS — G89.18 POST-OP PAIN: Primary | ICD-10-CM

## 2023-03-30 RX ORDER — HYDROCODONE BITARTRATE AND ACETAMINOPHEN 10; 325 MG/1; MG/1
1 TABLET ORAL EVERY 6 HOURS PRN
Qty: 28 TABLET | Refills: 0 | Status: SHIPPED | OUTPATIENT
Start: 2023-03-30 | End: 2023-04-06

## 2023-04-04 ENCOUNTER — TELEPHONE (OUTPATIENT)
Dept: HEMATOLOGY/ONCOLOGY | Facility: CLINIC | Age: 83
End: 2023-04-04
Payer: COMMERCIAL

## 2023-04-04 NOTE — TELEPHONE ENCOUNTER
----- Message from Argelia Crowder sent at 4/4/2023  1:07 PM CDT -----  Type: Needs Medical Advice  Who Called:  Nohemi (spouse)  Symptoms (please be specific):    How long has patient had these symptoms:    Pharmacy name and phone #:    Best Call Back Number: [phone]  Additional Information: Nohemi is requesting a call back regarding the skin graph that was done on her .

## 2023-04-04 NOTE — TELEPHONE ENCOUNTER
"Pt s/p application of skin graft by Dr Radford on 3/30/23.  S/W pt's wife, donor site is reddened and bumpy surrounding where the tegaderm bandage was.  The area covered by the tegaderm is normal appearing and the donor site looks "good."  Wife states the site on his head "looks good."    Requested that pt send photos through the my chart breann.  Wife states that she will send them over for review.   "

## 2023-04-05 ENCOUNTER — TELEPHONE (OUTPATIENT)
Dept: HEMATOLOGY/ONCOLOGY | Facility: CLINIC | Age: 83
End: 2023-04-05
Payer: COMMERCIAL

## 2023-04-06 ENCOUNTER — OFFICE VISIT (OUTPATIENT)
Dept: HEMATOLOGY/ONCOLOGY | Facility: CLINIC | Age: 83
End: 2023-04-06
Payer: COMMERCIAL

## 2023-04-06 VITALS
BODY MASS INDEX: 27.17 KG/M2 | WEIGHT: 205 LBS | DIASTOLIC BLOOD PRESSURE: 70 MMHG | SYSTOLIC BLOOD PRESSURE: 128 MMHG | RESPIRATION RATE: 18 BRPM | TEMPERATURE: 98 F | HEIGHT: 73 IN | HEART RATE: 55 BPM

## 2023-04-06 DIAGNOSIS — S01.00XD OPEN WOUND OF SCALP, UNSPECIFIED OPEN WOUND TYPE, SUBSEQUENT ENCOUNTER: ICD-10-CM

## 2023-04-06 DIAGNOSIS — R21 SKIN RASH: Primary | ICD-10-CM

## 2023-04-06 DIAGNOSIS — S81.801A OPEN WOUND OF RIGHT LOWER EXTREMITY, INITIAL ENCOUNTER: ICD-10-CM

## 2023-04-06 DIAGNOSIS — S01.00XA OPEN WOUND OF SCALP, UNSPECIFIED OPEN WOUND TYPE, INITIAL ENCOUNTER: Primary | ICD-10-CM

## 2023-04-06 PROCEDURE — 1160F RVW MEDS BY RX/DR IN RCRD: CPT | Mod: CPTII,S$GLB,, | Performed by: OTOLARYNGOLOGY

## 2023-04-06 PROCEDURE — 3074F SYST BP LT 130 MM HG: CPT | Mod: CPTII,S$GLB,, | Performed by: OTOLARYNGOLOGY

## 2023-04-06 PROCEDURE — 99999 PR PBB SHADOW E&M-EST. PATIENT-LVL IV: ICD-10-PCS | Mod: PBBFAC,,, | Performed by: OTOLARYNGOLOGY

## 2023-04-06 PROCEDURE — 99024 POSTOP FOLLOW-UP VISIT: CPT | Mod: S$GLB,,, | Performed by: OTOLARYNGOLOGY

## 2023-04-06 PROCEDURE — 3078F PR MOST RECENT DIASTOLIC BLOOD PRESSURE < 80 MM HG: ICD-10-PCS | Mod: CPTII,S$GLB,, | Performed by: OTOLARYNGOLOGY

## 2023-04-06 PROCEDURE — 99999 PR PBB SHADOW E&M-EST. PATIENT-LVL IV: CPT | Mod: PBBFAC,,, | Performed by: OTOLARYNGOLOGY

## 2023-04-06 PROCEDURE — 3074F PR MOST RECENT SYSTOLIC BLOOD PRESSURE < 130 MM HG: ICD-10-PCS | Mod: CPTII,S$GLB,, | Performed by: OTOLARYNGOLOGY

## 2023-04-06 PROCEDURE — 3078F DIAST BP <80 MM HG: CPT | Mod: CPTII,S$GLB,, | Performed by: OTOLARYNGOLOGY

## 2023-04-06 PROCEDURE — 99024 PR POST-OP FOLLOW-UP VISIT: ICD-10-PCS | Mod: S$GLB,,, | Performed by: OTOLARYNGOLOGY

## 2023-04-06 PROCEDURE — 1101F PR PT FALLS ASSESS DOC 0-1 FALLS W/OUT INJ PAST YR: ICD-10-PCS | Mod: CPTII,S$GLB,, | Performed by: OTOLARYNGOLOGY

## 2023-04-06 PROCEDURE — 1126F PR PAIN SEVERITY QUANTIFIED, NO PAIN PRESENT: ICD-10-PCS | Mod: CPTII,S$GLB,, | Performed by: OTOLARYNGOLOGY

## 2023-04-06 PROCEDURE — 1126F AMNT PAIN NOTED NONE PRSNT: CPT | Mod: CPTII,S$GLB,, | Performed by: OTOLARYNGOLOGY

## 2023-04-06 PROCEDURE — 1101F PT FALLS ASSESS-DOCD LE1/YR: CPT | Mod: CPTII,S$GLB,, | Performed by: OTOLARYNGOLOGY

## 2023-04-06 PROCEDURE — 1160F PR REVIEW ALL MEDS BY PRESCRIBER/CLIN PHARMACIST DOCUMENTED: ICD-10-PCS | Mod: CPTII,S$GLB,, | Performed by: OTOLARYNGOLOGY

## 2023-04-06 PROCEDURE — 1159F PR MEDICATION LIST DOCUMENTED IN MEDICAL RECORD: ICD-10-PCS | Mod: CPTII,S$GLB,, | Performed by: OTOLARYNGOLOGY

## 2023-04-06 PROCEDURE — 3288F FALL RISK ASSESSMENT DOCD: CPT | Mod: CPTII,S$GLB,, | Performed by: OTOLARYNGOLOGY

## 2023-04-06 PROCEDURE — 1159F MED LIST DOCD IN RCRD: CPT | Mod: CPTII,S$GLB,, | Performed by: OTOLARYNGOLOGY

## 2023-04-06 PROCEDURE — 3288F PR FALLS RISK ASSESSMENT DOCUMENTED: ICD-10-PCS | Mod: CPTII,S$GLB,, | Performed by: OTOLARYNGOLOGY

## 2023-04-06 RX ORDER — TRIAMCINOLONE ACETONIDE 1 MG/G
CREAM TOPICAL 3 TIMES DAILY
Qty: 30 G | Refills: 0 | Status: SHIPPED | OUTPATIENT
Start: 2023-04-06 | End: 2023-04-09 | Stop reason: SDUPTHER

## 2023-04-06 NOTE — PROGRESS NOTES
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  Patient presented about 3-4 months ago scalp. Tneder to touch. NO bleeding. No pruritis. Applying triple antibiotic S/P biopsy    Patient denies numbness and weakness of his face.  He denies parotid and neck masses.    He has no previous history of melanoma or non melanoma skin cancer and no family history of melanoma.  Hx of sunburns as a child/young adult.    11/3/2022  Patient is here today follow-up biopsy results and imaging studies.  He has no new complaints.    11/28/2022  Patient is here today for biopsy results of a possible metastasis and to discuss treatment planning.  FNA was negative for metastatic lesion.  Patient has no new complaints.    12/15/2022  Patient is here today for path and bolster removal.  He has no complaints.  He has had very minimal pain    1/5/2023  Patient is here today for wound check.  He is status post wide local excision melanoma of the scalp was sentinel lymph node biopsy.  Patient has positive margins and a satellite lesion.  After his case was presented at multidisciplinary head & neck tumor board it was decided to treat the patient with neoadjuvant immunotherapy followed by resection followed by a year of immunotherapy.    Patient has no complaints    2/14/2023  Patient is here today to discuss surgery.  He is status post wide local excision melanoma of the scalp was sentinel lymph node biopsy.  Tumor Board recommendations were that patient will be treated with neoadjuvant immunotherapy followed by resection followed by a year of immunotherapy (see plan below)    Patient has no complaints.    2/23/2023  Patient is here today for  pathology results status post re-excision of positive margins melanoma of the scalp.    He has no new complaints.  Patient's next dose of immunotherapy tomorrow.    3/9/2023  Patient is here today for wound check.  He has no new complaints.  He is tolerating immunotherapy with a minor rash.    4/6/2023  Patient is here 1 week status post full-thickness skin graft to scalp.  He reports that he has a rash around the donor site right leg.    ROS: see HPI  Constitutional: Negative for activity change and appetite change, weight loss.   Eyes: Negative for discharge, visual changes.   Respiratory: Negative for difficulty breathing and wheezing   Cardiovascular: Negative for chest pain.   Gastrointestinal: Negative for abdominal distention and abdominal pain.   Endocrine: Negative for cold intolerance and heat intolerance.   Genitourinary: Negative for dysuria.   Musculoskeletal: Negative for gait problem, muscle pain and joint swelling.   Skin: Negative for color change and pallor; negative for skin lesions.   Neurological: Negative for syncope and weakness; no numbness face.   Psychiatric/Behavioral: Negative for agitation and confusion; negative for depression.    Physical Exam:      Constitutional  General Appearance: well nourished, well-developed, alert, oriented, in no acute distress  Communication: ability, understanding, normal  Head and Face  Inspection: normocephalic, large scalp defect- STSG 100% take  Leg: donor site clean; surrounding maculopapular rash consistent with contact dermatitis    Re-excision positive margins (S/P 2 courses of immunotherapy)  1,2. SKIN AND SUBCUTANEOUS TISSUE OF SCALP VERTEX, WIDE EXCISION    RESECTION SITE AND 6 - 7 0'CLOCK MARGIN:   - NO RESIDUAL TUMOR SEEN.   - SURGICAL REPAIR REACTION.       Excision  SKIN, VERTEX SCALP, WIDE LOCAL EXCISION   - SPINDLE CELL MELANOMA   - PRESENT AT DEEP MARGIN   - MICROSATELLITE PRESET AT 12-3 O'CLOCK PERIPHERAL MARGIN     Comment:    Immunohistochemistry was performed with appropriate controls on block  1D based on the histopathologic findings and the results are  summarized as follows:   MelanA: Highlights melanocytes   Discussed with Dr. Radford on 12/13/22.     BRAF mutation testing has been ordered and the results will be reported    separately.     MELANOMA: SYNOPTIC REPORT   PROCEDURE:   Wide local excision   SPECIMEN LATERALITY: Vertex scalp   TUMOR SITE: Vertex scalp   GROSS TUMOR SIZE: 17 mm   MACROSCOPIC SATELLITE NODULE(S): Not identified   HISTOLOGIC TYPE: Spindle cell melanoma   SURGICAL MARGIN STATUS:   Invasive melanoma present at deep margin and 12-3:00 margin.   Margins negative for melanoma in-situ (2 mm to 3-6:00 margin)   MEASURED BRESLOW THICKNESS: 10.5 mm   DERMAL MITOTIC RATE (PER SQUARE MM): 12 mitoses per square mm   ULCERATION: Negative   GROWTH PHASE: Vertical   LEVEL  OF   INVASION (KEITH'S): Keith level V   MICROSATELLITOSIS: Present   NEUROTROPISM: Present   LYMPHOVASCULAR INVASION: Negative   TUMOR INFILTRATING LYMPHOCYTES: Present, non-brisk   TUMOR REGRESSION Negative   ASSOCIATED NEVUS Negative   IMMUNOHISTOCHEMICAL STUDIES: HMB45   IN-TRANSIT METASTASIS Negative   ADDITIONAL FINDINGS:   Not applicable   TNM CODE: pT4a pN1c (microsatellite with no known regional lymph node disease)     Records reviewed:   Med Onc Real Hart MD     Med Onc Chart Routing        Follow up with physician 4 weeks. Pt can proceed with treatment.  He needs a PET/CT 3/23/23 weeks.  He needs an appt with me 3/23/23 with CBC, CMP and TSH prior to the appt.  His treatment will be on 3/24/23.        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

## 2023-04-09 DIAGNOSIS — R21 SKIN RASH: ICD-10-CM

## 2023-04-10 RX ORDER — TRIAMCINOLONE ACETONIDE 1 MG/G
CREAM TOPICAL 3 TIMES DAILY
Qty: 30 G | Refills: 0 | Status: SHIPPED | OUTPATIENT
Start: 2023-04-10 | End: 2023-04-17

## 2023-04-11 ENCOUNTER — OFFICE VISIT (OUTPATIENT)
Dept: HEMATOLOGY/ONCOLOGY | Facility: CLINIC | Age: 83
End: 2023-04-11
Payer: COMMERCIAL

## 2023-04-11 VITALS
TEMPERATURE: 97 F | HEART RATE: 55 BPM | WEIGHT: 203.94 LBS | SYSTOLIC BLOOD PRESSURE: 118 MMHG | RESPIRATION RATE: 18 BRPM | OXYGEN SATURATION: 97 % | HEIGHT: 73 IN | DIASTOLIC BLOOD PRESSURE: 65 MMHG | BODY MASS INDEX: 27.03 KG/M2

## 2023-04-11 DIAGNOSIS — S01.00XD OPEN WOUND OF SCALP, UNSPECIFIED OPEN WOUND TYPE, SUBSEQUENT ENCOUNTER: Primary | ICD-10-CM

## 2023-04-11 PROCEDURE — 99024 PR POST-OP FOLLOW-UP VISIT: ICD-10-PCS | Mod: S$GLB,,, | Performed by: OTOLARYNGOLOGY

## 2023-04-11 PROCEDURE — 1126F PR PAIN SEVERITY QUANTIFIED, NO PAIN PRESENT: ICD-10-PCS | Mod: CPTII,S$GLB,, | Performed by: OTOLARYNGOLOGY

## 2023-04-11 PROCEDURE — 1101F PR PT FALLS ASSESS DOC 0-1 FALLS W/OUT INJ PAST YR: ICD-10-PCS | Mod: CPTII,S$GLB,, | Performed by: OTOLARYNGOLOGY

## 2023-04-11 PROCEDURE — 1126F AMNT PAIN NOTED NONE PRSNT: CPT | Mod: CPTII,S$GLB,, | Performed by: OTOLARYNGOLOGY

## 2023-04-11 PROCEDURE — 99999 PR PBB SHADOW E&M-EST. PATIENT-LVL IV: ICD-10-PCS | Mod: PBBFAC,,, | Performed by: OTOLARYNGOLOGY

## 2023-04-11 PROCEDURE — 3078F PR MOST RECENT DIASTOLIC BLOOD PRESSURE < 80 MM HG: ICD-10-PCS | Mod: CPTII,S$GLB,, | Performed by: OTOLARYNGOLOGY

## 2023-04-11 PROCEDURE — 99999 PR PBB SHADOW E&M-EST. PATIENT-LVL IV: CPT | Mod: PBBFAC,,, | Performed by: OTOLARYNGOLOGY

## 2023-04-11 PROCEDURE — 1101F PT FALLS ASSESS-DOCD LE1/YR: CPT | Mod: CPTII,S$GLB,, | Performed by: OTOLARYNGOLOGY

## 2023-04-11 PROCEDURE — 3074F SYST BP LT 130 MM HG: CPT | Mod: CPTII,S$GLB,, | Performed by: OTOLARYNGOLOGY

## 2023-04-11 PROCEDURE — 3288F FALL RISK ASSESSMENT DOCD: CPT | Mod: CPTII,S$GLB,, | Performed by: OTOLARYNGOLOGY

## 2023-04-11 PROCEDURE — 99024 POSTOP FOLLOW-UP VISIT: CPT | Mod: S$GLB,,, | Performed by: OTOLARYNGOLOGY

## 2023-04-11 PROCEDURE — 3288F PR FALLS RISK ASSESSMENT DOCUMENTED: ICD-10-PCS | Mod: CPTII,S$GLB,, | Performed by: OTOLARYNGOLOGY

## 2023-04-11 PROCEDURE — 3078F DIAST BP <80 MM HG: CPT | Mod: CPTII,S$GLB,, | Performed by: OTOLARYNGOLOGY

## 2023-04-11 PROCEDURE — 1159F MED LIST DOCD IN RCRD: CPT | Mod: CPTII,S$GLB,, | Performed by: OTOLARYNGOLOGY

## 2023-04-11 PROCEDURE — 1159F PR MEDICATION LIST DOCUMENTED IN MEDICAL RECORD: ICD-10-PCS | Mod: CPTII,S$GLB,, | Performed by: OTOLARYNGOLOGY

## 2023-04-11 PROCEDURE — 3074F PR MOST RECENT SYSTOLIC BLOOD PRESSURE < 130 MM HG: ICD-10-PCS | Mod: CPTII,S$GLB,, | Performed by: OTOLARYNGOLOGY

## 2023-04-11 NOTE — PROGRESS NOTES
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  Patient presented about 3-4 months ago scalp. Tneder to touch. NO bleeding. No pruritis. Applying triple antibiotic S/P biopsy    Patient denies numbness and weakness of his face.  He denies parotid and neck masses.    He has no previous history of melanoma or non melanoma skin cancer and no family history of melanoma.  Hx of sunburns as a child/young adult.    11/3/2022  Patient is here today follow-up biopsy results and imaging studies.  He has no new complaints.    11/28/2022  Patient is here today for biopsy results of a possible metastasis and to discuss treatment planning.  FNA was negative for metastatic lesion.  Patient has no new complaints.    12/15/2022  Patient is here today for path and bolster removal.  He has no complaints.  He has had very minimal pain    1/5/2023  Patient is here today for wound check.  He is status post wide local excision melanoma of the scalp was sentinel lymph node biopsy.  Patient has positive margins and a satellite lesion.  After his case was presented at multidisciplinary head & neck tumor board it was decided to treat the patient with neoadjuvant immunotherapy followed by resection followed by a year of immunotherapy.    Patient has no complaints    2/14/2023  Patient is here today to discuss surgery.  He is status post wide local excision melanoma of the scalp was sentinel lymph node biopsy.  Tumor Board recommendations were that patient will be treated with neoadjuvant immunotherapy followed by resection followed by a year of immunotherapy (see plan below)    Patient has no complaints.    2/23/2023  Patient is here today for  pathology results status post re-excision of positive margins melanoma of the scalp.    He has no new complaints.  Patient's next dose of immunotherapy tomorrow.    3/9/2023  Patient is here today for wound check.  He has no new complaints.  He is tolerating immunotherapy with a minor rash.    4/6/2023  Patient is here 1 week status post full-thickness skin graft to scalp.  He reports that he has a rash around the donor site right leg.    4/11/2023  Patient is here for postop visit   Rash near donor site is starting to improve with triamcinolone    ROS: see HPI  Constitutional: Negative for activity change and appetite change, weight loss.   Eyes: Negative for discharge, visual changes.   Respiratory: Negative for difficulty breathing and wheezing   Cardiovascular: Negative for chest pain.   Gastrointestinal: Negative for abdominal distention and abdominal pain.   Endocrine: Negative for cold intolerance and heat intolerance.   Genitourinary: Negative for dysuria.   Musculoskeletal: Negative for gait problem, muscle pain and joint swelling.   Skin: Negative for color change and pallor; negative for skin lesions.   Neurological: Negative for syncope and weakness; no numbness face.   Psychiatric/Behavioral: Negative for agitation and confusion; negative for depression.    Physical Exam:      Constitutional  General Appearance: well nourished, well-developed, alert, oriented, in no acute distress  Communication: ability, understanding, normal  Head and Face  Inspection: normocephalic, large scalp defect- STSG with a crescent shaped area along anterior aspect of the scalp defect with serous fluid-incision made and fluid evacuated--photos in epic  Leg: donor site clean; surrounding maculopapular rash consistent with contact dermatitis-improved    Re-excision positive margins (S/P 2 courses of immunotherapy)  1,2. SKIN AND SUBCUTANEOUS TISSUE OF SCALP VERTEX, WIDE EXCISION    RESECTION SITE AND 6 - 7 0'CLOCK MARGIN:   -  NO RESIDUAL TUMOR SEEN.   - SURGICAL REPAIR REACTION.       Excision  SKIN, VERTEX SCALP, WIDE LOCAL EXCISION   - SPINDLE CELL MELANOMA   - PRESENT AT DEEP MARGIN   - MICROSATELLITE PRESET AT 12-3 O'CLOCK PERIPHERAL MARGIN     Comment:   Immunohistochemistry was performed with appropriate controls on block  1D based on the histopathologic findings and the results are  summarized as follows:   MelanA: Highlights melanocytes   Discussed with Dr. Radford on 12/13/22.     BRAF mutation testing has been ordered and the results will be reported    separately.     MELANOMA: SYNOPTIC REPORT   PROCEDURE:   Wide local excision   SPECIMEN LATERALITY: Vertex scalp   TUMOR SITE: Vertex scalp   GROSS TUMOR SIZE: 17 mm   MACROSCOPIC SATELLITE NODULE(S): Not identified   HISTOLOGIC TYPE: Spindle cell melanoma   SURGICAL MARGIN STATUS:   Invasive melanoma present at deep margin and 12-3:00 margin.   Margins negative for melanoma in-situ (2 mm to 3-6:00 margin)   MEASURED BRESLOW THICKNESS: 10.5 mm   DERMAL MITOTIC RATE (PER SQUARE MM): 12 mitoses per square mm   ULCERATION: Negative   GROWTH PHASE: Vertical   LEVEL  OF   INVASION (KEITH'S): Keith level V   MICROSATELLITOSIS: Present   NEUROTROPISM: Present   LYMPHOVASCULAR INVASION: Negative   TUMOR INFILTRATING LYMPHOCYTES: Present, non-brisk   TUMOR REGRESSION Negative   ASSOCIATED NEVUS Negative   IMMUNOHISTOCHEMICAL STUDIES: HMB45   IN-TRANSIT METASTASIS Negative   ADDITIONAL FINDINGS:   Not applicable   TNM CODE: pT4a pN1c (microsatellite with no known regional lymph node disease)     Records reviewed:   Med Onc Real Hart MD     Med Onc Chart Routing        Follow up with physician 4 weeks. Pt can proceed with treatment.  He needs a PET/CT 3/23/23 weeks.  He needs an appt with me 3/23/23 with CBC, CMP and TSH prior to the appt.  His treatment will be on 3/24/23.        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

## 2023-04-20 ENCOUNTER — INFUSION (OUTPATIENT)
Dept: INFUSION THERAPY | Facility: HOSPITAL | Age: 83
End: 2023-04-20
Attending: INTERNAL MEDICINE
Payer: COMMERCIAL

## 2023-04-20 ENCOUNTER — LAB VISIT (OUTPATIENT)
Dept: LAB | Facility: HOSPITAL | Age: 83
End: 2023-04-20
Attending: INTERNAL MEDICINE
Payer: COMMERCIAL

## 2023-04-20 ENCOUNTER — DOCUMENTATION ONLY (OUTPATIENT)
Dept: INFUSION THERAPY | Facility: HOSPITAL | Age: 83
End: 2023-04-20
Payer: COMMERCIAL

## 2023-04-20 ENCOUNTER — OFFICE VISIT (OUTPATIENT)
Dept: HEMATOLOGY/ONCOLOGY | Facility: CLINIC | Age: 83
End: 2023-04-20
Payer: COMMERCIAL

## 2023-04-20 VITALS
HEART RATE: 57 BPM | OXYGEN SATURATION: 98 % | BODY MASS INDEX: 26.71 KG/M2 | HEIGHT: 73 IN | DIASTOLIC BLOOD PRESSURE: 70 MMHG | TEMPERATURE: 98 F | SYSTOLIC BLOOD PRESSURE: 132 MMHG | WEIGHT: 201.5 LBS

## 2023-04-20 VITALS
DIASTOLIC BLOOD PRESSURE: 75 MMHG | RESPIRATION RATE: 18 BRPM | TEMPERATURE: 98 F | SYSTOLIC BLOOD PRESSURE: 144 MMHG | BODY MASS INDEX: 26.71 KG/M2 | HEIGHT: 73 IN | HEART RATE: 56 BPM | WEIGHT: 201.5 LBS

## 2023-04-20 DIAGNOSIS — C43.4 MALIGNANT MELANOMA OF SCALP: ICD-10-CM

## 2023-04-20 DIAGNOSIS — C43.4 MALIGNANT MELANOMA OF SCALP: Primary | ICD-10-CM

## 2023-04-20 DIAGNOSIS — D84.821 IMMUNODEFICIENCY DUE TO DRUGS: ICD-10-CM

## 2023-04-20 DIAGNOSIS — I48.0 PAROXYSMAL ATRIAL FIBRILLATION: ICD-10-CM

## 2023-04-20 DIAGNOSIS — I10 PRIMARY HYPERTENSION: ICD-10-CM

## 2023-04-20 DIAGNOSIS — Z79.899 IMMUNODEFICIENCY DUE TO DRUGS: ICD-10-CM

## 2023-04-20 DIAGNOSIS — R22.9 SUBCUTANEOUS NODULE: ICD-10-CM

## 2023-04-20 LAB
ALBUMIN SERPL BCP-MCNC: 3.6 G/DL (ref 3.5–5.2)
ALP SERPL-CCNC: 113 U/L (ref 55–135)
ALT SERPL W/O P-5'-P-CCNC: 19 U/L (ref 10–44)
ANION GAP SERPL CALC-SCNC: 9 MMOL/L (ref 8–16)
AST SERPL-CCNC: 19 U/L (ref 10–40)
BASOPHILS # BLD AUTO: 0.07 K/UL (ref 0–0.2)
BASOPHILS NFR BLD: 1.7 % (ref 0–1.9)
BILIRUB SERPL-MCNC: 1 MG/DL (ref 0.1–1)
BUN SERPL-MCNC: 16 MG/DL (ref 8–23)
CALCIUM SERPL-MCNC: 9.4 MG/DL (ref 8.7–10.5)
CHLORIDE SERPL-SCNC: 103 MMOL/L (ref 95–110)
CO2 SERPL-SCNC: 26 MMOL/L (ref 23–29)
CREAT SERPL-MCNC: 1.2 MG/DL (ref 0.5–1.4)
DIFFERENTIAL METHOD: ABNORMAL
EOSINOPHIL # BLD AUTO: 0.3 K/UL (ref 0–0.5)
EOSINOPHIL NFR BLD: 7.9 % (ref 0–8)
ERYTHROCYTE [DISTWIDTH] IN BLOOD BY AUTOMATED COUNT: 14.2 % (ref 11.5–14.5)
EST. GFR  (NO RACE VARIABLE): >60 ML/MIN/1.73 M^2
GLUCOSE SERPL-MCNC: 93 MG/DL (ref 70–110)
HCT VFR BLD AUTO: 41.6 % (ref 40–54)
HGB BLD-MCNC: 13.9 G/DL (ref 14–18)
IMM GRANULOCYTES # BLD AUTO: 0.02 K/UL (ref 0–0.04)
IMM GRANULOCYTES NFR BLD AUTO: 0.5 % (ref 0–0.5)
LYMPHOCYTES # BLD AUTO: 0.9 K/UL (ref 1–4.8)
LYMPHOCYTES NFR BLD: 20.9 % (ref 18–48)
MCH RBC QN AUTO: 33.7 PG (ref 27–31)
MCHC RBC AUTO-ENTMCNC: 33.4 G/DL (ref 32–36)
MCV RBC AUTO: 101 FL (ref 82–98)
MONOCYTES # BLD AUTO: 0.5 K/UL (ref 0.3–1)
MONOCYTES NFR BLD: 11.3 % (ref 4–15)
NEUTROPHILS # BLD AUTO: 2.4 K/UL (ref 1.8–7.7)
NEUTROPHILS NFR BLD: 57.7 % (ref 38–73)
NRBC BLD-RTO: 0 /100 WBC
PLATELET # BLD AUTO: 202 K/UL (ref 150–450)
PMV BLD AUTO: 10.7 FL (ref 9.2–12.9)
POTASSIUM SERPL-SCNC: 4 MMOL/L (ref 3.5–5.1)
PROT SERPL-MCNC: 6.3 G/DL (ref 6–8.4)
RBC # BLD AUTO: 4.13 M/UL (ref 4.6–6.2)
SODIUM SERPL-SCNC: 138 MMOL/L (ref 136–145)
T4 FREE SERPL-MCNC: 1.5 NG/DL (ref 0.71–1.51)
TSH SERPL DL<=0.005 MIU/L-ACNC: 0.13 UIU/ML (ref 0.4–4)
WBC # BLD AUTO: 4.17 K/UL (ref 3.9–12.7)

## 2023-04-20 PROCEDURE — 25000003 PHARM REV CODE 250: Mod: PN | Performed by: INTERNAL MEDICINE

## 2023-04-20 PROCEDURE — 1126F PR PAIN SEVERITY QUANTIFIED, NO PAIN PRESENT: ICD-10-PCS | Mod: CPTII,S$GLB,, | Performed by: INTERNAL MEDICINE

## 2023-04-20 PROCEDURE — 84439 ASSAY OF FREE THYROXINE: CPT | Performed by: INTERNAL MEDICINE

## 2023-04-20 PROCEDURE — 3075F PR MOST RECENT SYSTOLIC BLOOD PRESS GE 130-139MM HG: ICD-10-PCS | Mod: CPTII,S$GLB,, | Performed by: INTERNAL MEDICINE

## 2023-04-20 PROCEDURE — 3078F PR MOST RECENT DIASTOLIC BLOOD PRESSURE < 80 MM HG: ICD-10-PCS | Mod: CPTII,S$GLB,, | Performed by: INTERNAL MEDICINE

## 2023-04-20 PROCEDURE — 1101F PR PT FALLS ASSESS DOC 0-1 FALLS W/OUT INJ PAST YR: ICD-10-PCS | Mod: CPTII,S$GLB,, | Performed by: INTERNAL MEDICINE

## 2023-04-20 PROCEDURE — 3288F PR FALLS RISK ASSESSMENT DOCUMENTED: ICD-10-PCS | Mod: CPTII,S$GLB,, | Performed by: INTERNAL MEDICINE

## 2023-04-20 PROCEDURE — 99999 PR PBB SHADOW E&M-EST. PATIENT-LVL III: CPT | Mod: PBBFAC,,, | Performed by: INTERNAL MEDICINE

## 2023-04-20 PROCEDURE — 1101F PT FALLS ASSESS-DOCD LE1/YR: CPT | Mod: CPTII,S$GLB,, | Performed by: INTERNAL MEDICINE

## 2023-04-20 PROCEDURE — 36415 COLL VENOUS BLD VENIPUNCTURE: CPT | Mod: PN | Performed by: INTERNAL MEDICINE

## 2023-04-20 PROCEDURE — 84443 ASSAY THYROID STIM HORMONE: CPT | Performed by: INTERNAL MEDICINE

## 2023-04-20 PROCEDURE — 96413 CHEMO IV INFUSION 1 HR: CPT | Mod: PN

## 2023-04-20 PROCEDURE — 1126F AMNT PAIN NOTED NONE PRSNT: CPT | Mod: CPTII,S$GLB,, | Performed by: INTERNAL MEDICINE

## 2023-04-20 PROCEDURE — 99215 OFFICE O/P EST HI 40 MIN: CPT | Mod: S$GLB,,, | Performed by: INTERNAL MEDICINE

## 2023-04-20 PROCEDURE — 3288F FALL RISK ASSESSMENT DOCD: CPT | Mod: CPTII,S$GLB,, | Performed by: INTERNAL MEDICINE

## 2023-04-20 PROCEDURE — 99215 PR OFFICE/OUTPT VISIT, EST, LEVL V, 40-54 MIN: ICD-10-PCS | Mod: S$GLB,,, | Performed by: INTERNAL MEDICINE

## 2023-04-20 PROCEDURE — 99999 PR PBB SHADOW E&M-EST. PATIENT-LVL III: ICD-10-PCS | Mod: PBBFAC,,, | Performed by: INTERNAL MEDICINE

## 2023-04-20 PROCEDURE — 3078F DIAST BP <80 MM HG: CPT | Mod: CPTII,S$GLB,, | Performed by: INTERNAL MEDICINE

## 2023-04-20 PROCEDURE — 85025 COMPLETE CBC W/AUTO DIFF WBC: CPT | Mod: PN | Performed by: INTERNAL MEDICINE

## 2023-04-20 PROCEDURE — 63600175 PHARM REV CODE 636 W HCPCS: Mod: JZ,JG,PN | Performed by: INTERNAL MEDICINE

## 2023-04-20 PROCEDURE — 3075F SYST BP GE 130 - 139MM HG: CPT | Mod: CPTII,S$GLB,, | Performed by: INTERNAL MEDICINE

## 2023-04-20 PROCEDURE — 80053 COMPREHEN METABOLIC PANEL: CPT | Mod: PN | Performed by: INTERNAL MEDICINE

## 2023-04-20 RX ORDER — SODIUM CHLORIDE 0.9 % (FLUSH) 0.9 %
10 SYRINGE (ML) INJECTION
Status: DISCONTINUED | OUTPATIENT
Start: 2023-04-20 | End: 2023-04-20 | Stop reason: HOSPADM

## 2023-04-20 RX ORDER — HEPARIN 100 UNIT/ML
500 SYRINGE INTRAVENOUS
Status: CANCELLED | OUTPATIENT
Start: 2023-04-20

## 2023-04-20 RX ORDER — SODIUM CHLORIDE 0.9 % (FLUSH) 0.9 %
10 SYRINGE (ML) INJECTION
Status: CANCELLED | OUTPATIENT
Start: 2023-04-20

## 2023-04-20 RX ADMIN — SODIUM CHLORIDE: 9 INJECTION, SOLUTION INTRAVENOUS at 02:04

## 2023-04-20 RX ADMIN — SODIUM CHLORIDE 480 MG: 9 INJECTION, SOLUTION INTRAVENOUS at 02:04

## 2023-04-20 NOTE — PLAN OF CARE
Problem: Fatigue  Goal: Improved Activity Tolerance  Intervention: Promote Improved Energy  Flowsheets (Taken 4/20/2023 1430)  Fatigue Management:   activity schedule adjusted   frequent rest breaks encouraged   paced activity encouraged   fatigue-related activity identified  Sleep/Rest Enhancement:   regular sleep/rest pattern promoted   relaxation techniques promoted   natural light exposure provided  Activity Management:   Ambulated -L4   Ambulated in almanza - L4   Up in stretcher chair - L1     Problem: Adult Inpatient Plan of Care  Goal: Patient-Specific Goal (Individualized)  Outcome: Ongoing, Progressing  Flowsheets (Taken 4/20/2023 1430)  Anxieties, Fears or Concerns: none  Individualized Care Needs: recliner, pillows, dim lights, tv, conversation

## 2023-04-20 NOTE — PLAN OF CARE
Problem: Adult Inpatient Plan of Care  Goal: Plan of Care Review  4/20/2023 1644 by Citlalli Peace, RN  Outcome: Ongoing, Progressing  Flowsheets (Taken 4/20/2023 1500)  Plan of Care Reviewed With:   patient   spouse   Pt tolerated his Opdivo infusion well, NAD. No new c/o voiced. Pt given a schedule and reviewed, pt verbalized understanding. Pt ambulated out of the clinic without difficulty accompanied by his wife.
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
## 2023-04-20 NOTE — PROGRESS NOTES
Oncology   Chemotherapy Infusion Visit    Quick Social Service Status Follow Up   Met w/ pt briefly to follow up on social and emotional needs since initiation of treatment.      SW met with pt and his wife. Pt said he had his skin graft at the end of march. He said it is still healing, He said the site on is leg where the skin graft was taken has been uncomfortable. He said he has been surprised that this site hurts worse than his head.   Pt in good spirits. Pt and wife shared stories with SW of their life and family.   SW provided pt with a gas card. Pt's wife said this has been helpful and they are grateful to receive this needed help.   No other needs noted today.          Monica Meeks, SAAD  04/20/2023  4:06 PM
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
## 2023-05-02 ENCOUNTER — OFFICE VISIT (OUTPATIENT)
Dept: HEMATOLOGY/ONCOLOGY | Facility: CLINIC | Age: 83
End: 2023-05-02
Payer: COMMERCIAL

## 2023-05-02 VITALS
SYSTOLIC BLOOD PRESSURE: 98 MMHG | DIASTOLIC BLOOD PRESSURE: 51 MMHG | OXYGEN SATURATION: 97 % | RESPIRATION RATE: 18 BRPM | TEMPERATURE: 97 F | WEIGHT: 203.25 LBS | HEART RATE: 55 BPM | BODY MASS INDEX: 26.94 KG/M2 | HEIGHT: 73 IN

## 2023-05-02 DIAGNOSIS — C43.4 MALIGNANT MELANOMA OF SCALP: Primary | ICD-10-CM

## 2023-05-02 DIAGNOSIS — S01.00XS OPEN WOUND OF SCALP, UNSPECIFIED OPEN WOUND TYPE, SEQUELA: ICD-10-CM

## 2023-05-02 PROCEDURE — 99999 PR PBB SHADOW E&M-EST. PATIENT-LVL IV: CPT | Mod: PBBFAC,,, | Performed by: OTOLARYNGOLOGY

## 2023-05-02 PROCEDURE — 3078F DIAST BP <80 MM HG: CPT | Mod: CPTII,S$GLB,, | Performed by: OTOLARYNGOLOGY

## 2023-05-02 PROCEDURE — 99024 PR POST-OP FOLLOW-UP VISIT: ICD-10-PCS | Mod: S$GLB,,, | Performed by: OTOLARYNGOLOGY

## 2023-05-02 PROCEDURE — 1101F PR PT FALLS ASSESS DOC 0-1 FALLS W/OUT INJ PAST YR: ICD-10-PCS | Mod: CPTII,S$GLB,, | Performed by: OTOLARYNGOLOGY

## 2023-05-02 PROCEDURE — 3288F PR FALLS RISK ASSESSMENT DOCUMENTED: ICD-10-PCS | Mod: CPTII,S$GLB,, | Performed by: OTOLARYNGOLOGY

## 2023-05-02 PROCEDURE — 3074F SYST BP LT 130 MM HG: CPT | Mod: CPTII,S$GLB,, | Performed by: OTOLARYNGOLOGY

## 2023-05-02 PROCEDURE — 3288F FALL RISK ASSESSMENT DOCD: CPT | Mod: CPTII,S$GLB,, | Performed by: OTOLARYNGOLOGY

## 2023-05-02 PROCEDURE — 3074F PR MOST RECENT SYSTOLIC BLOOD PRESSURE < 130 MM HG: ICD-10-PCS | Mod: CPTII,S$GLB,, | Performed by: OTOLARYNGOLOGY

## 2023-05-02 PROCEDURE — 1159F PR MEDICATION LIST DOCUMENTED IN MEDICAL RECORD: ICD-10-PCS | Mod: CPTII,S$GLB,, | Performed by: OTOLARYNGOLOGY

## 2023-05-02 PROCEDURE — 1126F AMNT PAIN NOTED NONE PRSNT: CPT | Mod: CPTII,S$GLB,, | Performed by: OTOLARYNGOLOGY

## 2023-05-02 PROCEDURE — 1126F PR PAIN SEVERITY QUANTIFIED, NO PAIN PRESENT: ICD-10-PCS | Mod: CPTII,S$GLB,, | Performed by: OTOLARYNGOLOGY

## 2023-05-02 PROCEDURE — 1159F MED LIST DOCD IN RCRD: CPT | Mod: CPTII,S$GLB,, | Performed by: OTOLARYNGOLOGY

## 2023-05-02 PROCEDURE — 99999 PR PBB SHADOW E&M-EST. PATIENT-LVL IV: ICD-10-PCS | Mod: PBBFAC,,, | Performed by: OTOLARYNGOLOGY

## 2023-05-02 PROCEDURE — 1101F PT FALLS ASSESS-DOCD LE1/YR: CPT | Mod: CPTII,S$GLB,, | Performed by: OTOLARYNGOLOGY

## 2023-05-02 PROCEDURE — 3078F PR MOST RECENT DIASTOLIC BLOOD PRESSURE < 80 MM HG: ICD-10-PCS | Mod: CPTII,S$GLB,, | Performed by: OTOLARYNGOLOGY

## 2023-05-02 PROCEDURE — 99024 POSTOP FOLLOW-UP VISIT: CPT | Mod: S$GLB,,, | Performed by: OTOLARYNGOLOGY

## 2023-05-11 ENCOUNTER — TELEPHONE (OUTPATIENT)
Dept: HEMATOLOGY/ONCOLOGY | Facility: CLINIC | Age: 83
End: 2023-05-11
Payer: COMMERCIAL

## 2023-05-11 NOTE — TELEPHONE ENCOUNTER
Pt s/p application of skin graft to scalp on 3/30/23 by Dr Radford. Wife calling to report that pt's donor site not healed and is having liquid drainage. She reports donor site is free from redness or swelling.  Pt continues with Aquaphor ointment to site. Previous rash surrounding donor site comes and goes.  Pt would like to use Xeroform gauze to site. Dr Radford notified and stated that pt may use Xeroform gauze without the Aquaphor.  Pt's wife will call back Monday to update us on pt status and if not improved Dr Radford recommends that pt return to his dermatologist.  Wife verbalized understanding.   ----- Message from Vera Martinez sent at 5/11/2023 10:59 AM CDT -----  Type: Need Medical Advice   Who Called: Patient wife  Best callback number: [phone]  Additional Information: wife of patient called with questions about his leg  Please call to further assist, Thanks

## 2023-05-17 ENCOUNTER — TELEPHONE (OUTPATIENT)
Dept: HEMATOLOGY/ONCOLOGY | Facility: CLINIC | Age: 83
End: 2023-05-17
Payer: COMMERCIAL

## 2023-05-17 NOTE — TELEPHONE ENCOUNTER
Called to check on pt's donor site on leg.  Pt and wife state that pt's leg is doing well and healing.  States that they are using Xeroform gauze and aquaphor on donor site.  Pt will be in clinic tomorrow for visit with Shaina Carpio NP, they will call me over if they want pt's leg assessed. Wife verbalized appreciation for call.
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
## 2023-05-18 ENCOUNTER — DOCUMENTATION ONLY (OUTPATIENT)
Dept: INFUSION THERAPY | Facility: HOSPITAL | Age: 83
End: 2023-05-18
Payer: COMMERCIAL

## 2023-05-18 ENCOUNTER — LAB VISIT (OUTPATIENT)
Dept: LAB | Facility: HOSPITAL | Age: 83
End: 2023-05-18
Payer: COMMERCIAL

## 2023-05-18 ENCOUNTER — INFUSION (OUTPATIENT)
Dept: INFUSION THERAPY | Facility: HOSPITAL | Age: 83
End: 2023-05-18
Attending: INTERNAL MEDICINE
Payer: COMMERCIAL

## 2023-05-18 ENCOUNTER — OFFICE VISIT (OUTPATIENT)
Dept: HEMATOLOGY/ONCOLOGY | Facility: CLINIC | Age: 83
End: 2023-05-18
Payer: COMMERCIAL

## 2023-05-18 VITALS
HEART RATE: 52 BPM | HEIGHT: 73 IN | RESPIRATION RATE: 16 BRPM | OXYGEN SATURATION: 100 % | TEMPERATURE: 98 F | DIASTOLIC BLOOD PRESSURE: 72 MMHG | SYSTOLIC BLOOD PRESSURE: 130 MMHG | WEIGHT: 202.63 LBS | BODY MASS INDEX: 26.85 KG/M2

## 2023-05-18 VITALS
RESPIRATION RATE: 16 BRPM | SYSTOLIC BLOOD PRESSURE: 128 MMHG | HEIGHT: 73 IN | TEMPERATURE: 98 F | WEIGHT: 202.63 LBS | HEART RATE: 58 BPM | BODY MASS INDEX: 26.85 KG/M2 | DIASTOLIC BLOOD PRESSURE: 59 MMHG

## 2023-05-18 DIAGNOSIS — Z79.899 IMMUNODEFICIENCY DUE TO DRUGS: ICD-10-CM

## 2023-05-18 DIAGNOSIS — I48.0 PAROXYSMAL ATRIAL FIBRILLATION: ICD-10-CM

## 2023-05-18 DIAGNOSIS — I10 PRIMARY HYPERTENSION: ICD-10-CM

## 2023-05-18 DIAGNOSIS — C43.4 MALIGNANT MELANOMA OF SCALP: Primary | ICD-10-CM

## 2023-05-18 DIAGNOSIS — D84.821 IMMUNODEFICIENCY DUE TO DRUGS: ICD-10-CM

## 2023-05-18 DIAGNOSIS — C43.4 MALIGNANT MELANOMA OF SCALP: ICD-10-CM

## 2023-05-18 LAB
ALBUMIN SERPL BCP-MCNC: 3.7 G/DL (ref 3.5–5.2)
ALP SERPL-CCNC: 100 U/L (ref 55–135)
ALT SERPL W/O P-5'-P-CCNC: 16 U/L (ref 10–44)
ANION GAP SERPL CALC-SCNC: 9 MMOL/L (ref 8–16)
AST SERPL-CCNC: 18 U/L (ref 10–40)
BASOPHILS # BLD AUTO: 0.07 K/UL (ref 0–0.2)
BASOPHILS NFR BLD: 1.5 % (ref 0–1.9)
BILIRUB SERPL-MCNC: 0.9 MG/DL (ref 0.1–1)
BUN SERPL-MCNC: 15 MG/DL (ref 8–23)
CALCIUM SERPL-MCNC: 9.4 MG/DL (ref 8.7–10.5)
CHLORIDE SERPL-SCNC: 103 MMOL/L (ref 95–110)
CO2 SERPL-SCNC: 26 MMOL/L (ref 23–29)
CREAT SERPL-MCNC: 1.4 MG/DL (ref 0.5–1.4)
DIFFERENTIAL METHOD: ABNORMAL
EOSINOPHIL # BLD AUTO: 0.4 K/UL (ref 0–0.5)
EOSINOPHIL NFR BLD: 7.8 % (ref 0–8)
ERYTHROCYTE [DISTWIDTH] IN BLOOD BY AUTOMATED COUNT: 13.7 % (ref 11.5–14.5)
EST. GFR  (NO RACE VARIABLE): 50.2 ML/MIN/1.73 M^2
GLUCOSE SERPL-MCNC: 90 MG/DL (ref 70–110)
HCT VFR BLD AUTO: 41.3 % (ref 40–54)
HGB BLD-MCNC: 13.8 G/DL (ref 14–18)
IMM GRANULOCYTES # BLD AUTO: 0.02 K/UL (ref 0–0.04)
IMM GRANULOCYTES NFR BLD AUTO: 0.4 % (ref 0–0.5)
LYMPHOCYTES # BLD AUTO: 0.8 K/UL (ref 1–4.8)
LYMPHOCYTES NFR BLD: 16.9 % (ref 18–48)
MCH RBC QN AUTO: 33.6 PG (ref 27–31)
MCHC RBC AUTO-ENTMCNC: 33.4 G/DL (ref 32–36)
MCV RBC AUTO: 101 FL (ref 82–98)
MONOCYTES # BLD AUTO: 0.5 K/UL (ref 0.3–1)
MONOCYTES NFR BLD: 11.1 % (ref 4–15)
NEUTROPHILS # BLD AUTO: 2.9 K/UL (ref 1.8–7.7)
NEUTROPHILS NFR BLD: 62.3 % (ref 38–73)
NRBC BLD-RTO: 0 /100 WBC
PLATELET # BLD AUTO: 190 K/UL (ref 150–450)
PMV BLD AUTO: 10.6 FL (ref 9.2–12.9)
POTASSIUM SERPL-SCNC: 4.2 MMOL/L (ref 3.5–5.1)
PROT SERPL-MCNC: 6.2 G/DL (ref 6–8.4)
RBC # BLD AUTO: 4.11 M/UL (ref 4.6–6.2)
SODIUM SERPL-SCNC: 138 MMOL/L (ref 136–145)
T4 FREE SERPL-MCNC: 1.4 NG/DL (ref 0.71–1.51)
TSH SERPL DL<=0.005 MIU/L-ACNC: 0.06 UIU/ML (ref 0.4–4)
WBC # BLD AUTO: 4.61 K/UL (ref 3.9–12.7)

## 2023-05-18 PROCEDURE — 3075F SYST BP GE 130 - 139MM HG: CPT | Mod: CPTII,S$GLB,,

## 2023-05-18 PROCEDURE — 84439 ASSAY OF FREE THYROXINE: CPT | Performed by: INTERNAL MEDICINE

## 2023-05-18 PROCEDURE — 3288F FALL RISK ASSESSMENT DOCD: CPT | Mod: CPTII,S$GLB,,

## 2023-05-18 PROCEDURE — 99214 PR OFFICE/OUTPT VISIT, EST, LEVL IV, 30-39 MIN: ICD-10-PCS | Mod: S$GLB,,,

## 2023-05-18 PROCEDURE — 63600175 PHARM REV CODE 636 W HCPCS: Mod: JZ,JG,PN

## 2023-05-18 PROCEDURE — 3288F PR FALLS RISK ASSESSMENT DOCUMENTED: ICD-10-PCS | Mod: CPTII,S$GLB,,

## 2023-05-18 PROCEDURE — 1101F PR PT FALLS ASSESS DOC 0-1 FALLS W/OUT INJ PAST YR: ICD-10-PCS | Mod: CPTII,S$GLB,,

## 2023-05-18 PROCEDURE — 3078F DIAST BP <80 MM HG: CPT | Mod: CPTII,S$GLB,,

## 2023-05-18 PROCEDURE — 36415 COLL VENOUS BLD VENIPUNCTURE: CPT | Mod: PN | Performed by: INTERNAL MEDICINE

## 2023-05-18 PROCEDURE — 99214 OFFICE O/P EST MOD 30 MIN: CPT | Mod: S$GLB,,,

## 2023-05-18 PROCEDURE — 1101F PT FALLS ASSESS-DOCD LE1/YR: CPT | Mod: CPTII,S$GLB,,

## 2023-05-18 PROCEDURE — 1126F PR PAIN SEVERITY QUANTIFIED, NO PAIN PRESENT: ICD-10-PCS | Mod: CPTII,S$GLB,,

## 2023-05-18 PROCEDURE — 96413 CHEMO IV INFUSION 1 HR: CPT | Mod: PN

## 2023-05-18 PROCEDURE — 3075F PR MOST RECENT SYSTOLIC BLOOD PRESS GE 130-139MM HG: ICD-10-PCS | Mod: CPTII,S$GLB,,

## 2023-05-18 PROCEDURE — 84443 ASSAY THYROID STIM HORMONE: CPT | Performed by: INTERNAL MEDICINE

## 2023-05-18 PROCEDURE — 1126F AMNT PAIN NOTED NONE PRSNT: CPT | Mod: CPTII,S$GLB,,

## 2023-05-18 PROCEDURE — 25000003 PHARM REV CODE 250: Mod: PN

## 2023-05-18 PROCEDURE — 99999 PR PBB SHADOW E&M-EST. PATIENT-LVL IV: CPT | Mod: PBBFAC,,,

## 2023-05-18 PROCEDURE — A4216 STERILE WATER/SALINE, 10 ML: HCPCS | Mod: PN

## 2023-05-18 PROCEDURE — 99999 PR PBB SHADOW E&M-EST. PATIENT-LVL IV: ICD-10-PCS | Mod: PBBFAC,,,

## 2023-05-18 PROCEDURE — 3078F PR MOST RECENT DIASTOLIC BLOOD PRESSURE < 80 MM HG: ICD-10-PCS | Mod: CPTII,S$GLB,,

## 2023-05-18 PROCEDURE — 85025 COMPLETE CBC W/AUTO DIFF WBC: CPT | Mod: PN | Performed by: INTERNAL MEDICINE

## 2023-05-18 PROCEDURE — 80053 COMPREHEN METABOLIC PANEL: CPT | Mod: PN | Performed by: INTERNAL MEDICINE

## 2023-05-18 RX ORDER — SODIUM CHLORIDE 0.9 % (FLUSH) 0.9 %
10 SYRINGE (ML) INJECTION
Status: DISCONTINUED | OUTPATIENT
Start: 2023-05-18 | End: 2023-05-18 | Stop reason: HOSPADM

## 2023-05-18 RX ORDER — HEPARIN 100 UNIT/ML
500 SYRINGE INTRAVENOUS
Status: CANCELLED | OUTPATIENT
Start: 2023-05-18

## 2023-05-18 RX ORDER — HEPARIN 100 UNIT/ML
500 SYRINGE INTRAVENOUS
Status: DISCONTINUED | OUTPATIENT
Start: 2023-05-18 | End: 2023-05-18 | Stop reason: HOSPADM

## 2023-05-18 RX ORDER — SODIUM CHLORIDE 0.9 % (FLUSH) 0.9 %
10 SYRINGE (ML) INJECTION
Status: CANCELLED | OUTPATIENT
Start: 2023-05-18

## 2023-05-18 RX ADMIN — SODIUM CHLORIDE 480 MG: 9 INJECTION, SOLUTION INTRAVENOUS at 02:05

## 2023-05-18 RX ADMIN — Medication 10 ML: at 02:05

## 2023-05-18 RX ADMIN — SODIUM CHLORIDE: 9 INJECTION, SOLUTION INTRAVENOUS at 02:05

## 2023-05-18 NOTE — PLAN OF CARE
Problem: Fatigue  Goal: Improved Activity Tolerance  Intervention: Promote Improved Energy  Flowsheets (Taken 5/18/2023 1413)  Fatigue Management:   activity schedule adjusted   frequent rest breaks encouraged   paced activity encouraged   fatigue-related activity identified  Sleep/Rest Enhancement:   relaxation techniques promoted   natural light exposure provided   regular sleep/rest pattern promoted  Activity Management:   Ambulated -L4   Ambulated in almanza - L4   Up in stretcher chair - L1     Problem: Adult Inpatient Plan of Care  Goal: Patient-Specific Goal (Individualized)  Outcome: Ongoing, Progressing  Flowsheets (Taken 5/18/2023 1413)  Anxieties, Fears or Concerns: none  Individualized Care Needs: recliner, pillow, dim lights, conversation

## 2023-05-18 NOTE — PLAN OF CARE
Problem: Adult Inpatient Plan of Care  Goal: Plan of Care Review  5/18/2023 1546 by Citlalli Peace, RN  Outcome: Ongoing, Progressing  Flowsheets (Taken 5/18/2023 1515)  Plan of Care Reviewed With:   patient   spouse   Pt tolerated his Opdivo infusion well, NAD. No new c/o voiced. Pt given a schedule and reviewed, pt verbalized understanding. Pt ambulated out of the clinic without difficulty accompanied by his wife.

## 2023-05-18 NOTE — PROGRESS NOTES
2:45 PM    [facility] met this pt briefly to provide a gas card.

## 2023-05-18 NOTE — PROGRESS NOTES
Oncology Nutrition   Chemotherapy Infusion Visit    Nutrition Follow Up   RD met pt and spouse at chairside. They both deny any nutrition related concerns. When he does have constipation pt drinks prune juice for bowel movement. No questions or concerns for RD.     Wt Readings from Last 10 Encounters:   05/18/23 91.9 kg (202 lb 9.6 oz)   05/18/23 91.9 kg (202 lb 9.6 oz)   05/02/23 92.2 kg (203 lb 4.2 oz)   04/20/23 91.4 kg (201 lb 8 oz)   04/20/23 91.4 kg (201 lb 8 oz)   04/11/23 92.5 kg (203 lb 14.8 oz)   04/06/23 93 kg (205 lb 0.4 oz)   03/30/23 95.3 kg (210 lb)   03/23/23 91.9 kg (202 lb 9.6 oz)   03/23/23 91.9 kg (202 lb 9.6 oz)     Will continue to monitor prn throughout treatment.     Madisyn Pittman, RAIMUNDO, LDN, [facility]  05/18/2023  2:50 PM

## 2023-05-19 ENCOUNTER — TELEPHONE (OUTPATIENT)
Dept: INFUSION THERAPY | Facility: HOSPITAL | Age: 83
End: 2023-05-19
Payer: COMMERCIAL

## 2023-05-19 NOTE — TELEPHONE ENCOUNTER
----- Message from Vera Martinez sent at 5/19/2023 12:51 PM CDT -----  Type: Need Medical Advice   Who Called: wife of patient   Best callback number: [phone]  Additional Information:  Called to change appointment from 6/15 to 6/14  Please call to further assist, Thanks

## 2023-06-06 ENCOUNTER — PATIENT MESSAGE (OUTPATIENT)
Dept: DERMATOLOGY | Facility: CLINIC | Age: 83
End: 2023-06-06
Payer: COMMERCIAL
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
## 2023-06-14 ENCOUNTER — OFFICE VISIT (OUTPATIENT)
Dept: HEMATOLOGY/ONCOLOGY | Facility: CLINIC | Age: 83
End: 2023-06-14
Payer: COMMERCIAL

## 2023-06-14 ENCOUNTER — LAB VISIT (OUTPATIENT)
Dept: LAB | Facility: HOSPITAL | Age: 83
End: 2023-06-14
Attending: INTERNAL MEDICINE
Payer: COMMERCIAL

## 2023-06-14 ENCOUNTER — INFUSION (OUTPATIENT)
Dept: INFUSION THERAPY | Facility: HOSPITAL | Age: 83
End: 2023-06-14
Attending: INTERNAL MEDICINE
Payer: COMMERCIAL

## 2023-06-14 VITALS
WEIGHT: 201.25 LBS | SYSTOLIC BLOOD PRESSURE: 102 MMHG | DIASTOLIC BLOOD PRESSURE: 62 MMHG | TEMPERATURE: 97 F | BODY MASS INDEX: 26.67 KG/M2 | HEART RATE: 57 BPM | HEIGHT: 73 IN | OXYGEN SATURATION: 97 % | RESPIRATION RATE: 16 BRPM

## 2023-06-14 VITALS
WEIGHT: 201.25 LBS | TEMPERATURE: 97 F | RESPIRATION RATE: 16 BRPM | HEIGHT: 73 IN | SYSTOLIC BLOOD PRESSURE: 147 MMHG | DIASTOLIC BLOOD PRESSURE: 71 MMHG | BODY MASS INDEX: 26.67 KG/M2 | HEART RATE: 57 BPM

## 2023-06-14 DIAGNOSIS — D84.821 IMMUNODEFICIENCY DUE TO DRUGS: ICD-10-CM

## 2023-06-14 DIAGNOSIS — I10 PRIMARY HYPERTENSION: ICD-10-CM

## 2023-06-14 DIAGNOSIS — C43.4 MALIGNANT MELANOMA OF SCALP: Primary | ICD-10-CM

## 2023-06-14 DIAGNOSIS — Z79.899 IMMUNODEFICIENCY DUE TO DRUGS: ICD-10-CM

## 2023-06-14 DIAGNOSIS — I48.0 PAROXYSMAL ATRIAL FIBRILLATION: ICD-10-CM

## 2023-06-14 DIAGNOSIS — C43.4 MALIGNANT MELANOMA OF SCALP: ICD-10-CM

## 2023-06-14 LAB
ALBUMIN SERPL BCP-MCNC: 4 G/DL (ref 3.5–5.2)
ALP SERPL-CCNC: 103 U/L (ref 55–135)
ALT SERPL W/O P-5'-P-CCNC: 19 U/L (ref 10–44)
ANION GAP SERPL CALC-SCNC: 12 MMOL/L (ref 8–16)
AST SERPL-CCNC: 22 U/L (ref 10–40)
BASOPHILS # BLD AUTO: 0.04 K/UL (ref 0–0.2)
BASOPHILS NFR BLD: 0.8 % (ref 0–1.9)
BILIRUB SERPL-MCNC: 0.8 MG/DL (ref 0.1–1)
BUN SERPL-MCNC: 18 MG/DL (ref 8–23)
CALCIUM SERPL-MCNC: 9.7 MG/DL (ref 8.7–10.5)
CHLORIDE SERPL-SCNC: 101 MMOL/L (ref 95–110)
CO2 SERPL-SCNC: 26 MMOL/L (ref 23–29)
CREAT SERPL-MCNC: 1.5 MG/DL (ref 0.5–1.4)
DIFFERENTIAL METHOD: ABNORMAL
EOSINOPHIL # BLD AUTO: 0.2 K/UL (ref 0–0.5)
EOSINOPHIL NFR BLD: 4.4 % (ref 0–8)
ERYTHROCYTE [DISTWIDTH] IN BLOOD BY AUTOMATED COUNT: 13.9 % (ref 11.5–14.5)
EST. GFR  (NO RACE VARIABLE): 46.2 ML/MIN/1.73 M^2
GLUCOSE SERPL-MCNC: 104 MG/DL (ref 70–110)
HCT VFR BLD AUTO: 45.3 % (ref 40–54)
HGB BLD-MCNC: 15.2 G/DL (ref 14–18)
IMM GRANULOCYTES # BLD AUTO: 0.04 K/UL (ref 0–0.04)
IMM GRANULOCYTES NFR BLD AUTO: 0.8 % (ref 0–0.5)
LYMPHOCYTES # BLD AUTO: 0.8 K/UL (ref 1–4.8)
LYMPHOCYTES NFR BLD: 15 % (ref 18–48)
MCH RBC QN AUTO: 33 PG (ref 27–31)
MCHC RBC AUTO-ENTMCNC: 33.6 G/DL (ref 32–36)
MCV RBC AUTO: 98 FL (ref 82–98)
MONOCYTES # BLD AUTO: 0.4 K/UL (ref 0.3–1)
MONOCYTES NFR BLD: 8.4 % (ref 4–15)
NEUTROPHILS # BLD AUTO: 3.5 K/UL (ref 1.8–7.7)
NEUTROPHILS NFR BLD: 70.6 % (ref 38–73)
NRBC BLD-RTO: 0 /100 WBC
PLATELET # BLD AUTO: 199 K/UL (ref 150–450)
PMV BLD AUTO: 10.5 FL (ref 9.2–12.9)
POTASSIUM SERPL-SCNC: 4.6 MMOL/L (ref 3.5–5.1)
PROT SERPL-MCNC: 6.7 G/DL (ref 6–8.4)
RBC # BLD AUTO: 4.61 M/UL (ref 4.6–6.2)
SODIUM SERPL-SCNC: 139 MMOL/L (ref 136–145)
T4 FREE SERPL-MCNC: 1.23 NG/DL (ref 0.71–1.51)
TSH SERPL DL<=0.005 MIU/L-ACNC: 0.17 UIU/ML (ref 0.4–4)
WBC # BLD AUTO: 4.99 K/UL (ref 3.9–12.7)

## 2023-06-14 PROCEDURE — 99214 PR OFFICE/OUTPT VISIT, EST, LEVL IV, 30-39 MIN: ICD-10-PCS | Mod: S$GLB,,,

## 2023-06-14 PROCEDURE — 1159F PR MEDICATION LIST DOCUMENTED IN MEDICAL RECORD: ICD-10-PCS | Mod: CPTII,S$GLB,,

## 2023-06-14 PROCEDURE — 1101F PR PT FALLS ASSESS DOC 0-1 FALLS W/OUT INJ PAST YR: ICD-10-PCS | Mod: CPTII,S$GLB,,

## 2023-06-14 PROCEDURE — 1126F PR PAIN SEVERITY QUANTIFIED, NO PAIN PRESENT: ICD-10-PCS | Mod: CPTII,S$GLB,,

## 2023-06-14 PROCEDURE — 3078F DIAST BP <80 MM HG: CPT | Mod: CPTII,S$GLB,,

## 2023-06-14 PROCEDURE — 84439 ASSAY OF FREE THYROXINE: CPT

## 2023-06-14 PROCEDURE — 63600175 PHARM REV CODE 636 W HCPCS: Mod: JZ,JG,PN

## 2023-06-14 PROCEDURE — 3288F PR FALLS RISK ASSESSMENT DOCUMENTED: ICD-10-PCS | Mod: CPTII,S$GLB,,

## 2023-06-14 PROCEDURE — 3288F FALL RISK ASSESSMENT DOCD: CPT | Mod: CPTII,S$GLB,,

## 2023-06-14 PROCEDURE — 80053 COMPREHEN METABOLIC PANEL: CPT | Mod: PN

## 2023-06-14 PROCEDURE — 1126F AMNT PAIN NOTED NONE PRSNT: CPT | Mod: CPTII,S$GLB,,

## 2023-06-14 PROCEDURE — 99999 PR PBB SHADOW E&M-EST. PATIENT-LVL IV: ICD-10-PCS | Mod: PBBFAC,,,

## 2023-06-14 PROCEDURE — 84443 ASSAY THYROID STIM HORMONE: CPT

## 2023-06-14 PROCEDURE — A4216 STERILE WATER/SALINE, 10 ML: HCPCS | Mod: PN

## 2023-06-14 PROCEDURE — 3074F PR MOST RECENT SYSTOLIC BLOOD PRESSURE < 130 MM HG: ICD-10-PCS | Mod: CPTII,S$GLB,,

## 2023-06-14 PROCEDURE — 99214 OFFICE O/P EST MOD 30 MIN: CPT | Mod: S$GLB,,,

## 2023-06-14 PROCEDURE — 3074F SYST BP LT 130 MM HG: CPT | Mod: CPTII,S$GLB,,

## 2023-06-14 PROCEDURE — 36415 COLL VENOUS BLD VENIPUNCTURE: CPT | Mod: PN

## 2023-06-14 PROCEDURE — 96413 CHEMO IV INFUSION 1 HR: CPT | Mod: PN

## 2023-06-14 PROCEDURE — 25000003 PHARM REV CODE 250: Mod: PN

## 2023-06-14 PROCEDURE — 85025 COMPLETE CBC W/AUTO DIFF WBC: CPT | Mod: PN

## 2023-06-14 PROCEDURE — 1101F PT FALLS ASSESS-DOCD LE1/YR: CPT | Mod: CPTII,S$GLB,,

## 2023-06-14 PROCEDURE — 1159F MED LIST DOCD IN RCRD: CPT | Mod: CPTII,S$GLB,,

## 2023-06-14 PROCEDURE — 99999 PR PBB SHADOW E&M-EST. PATIENT-LVL IV: CPT | Mod: PBBFAC,,,

## 2023-06-14 PROCEDURE — 3078F PR MOST RECENT DIASTOLIC BLOOD PRESSURE < 80 MM HG: ICD-10-PCS | Mod: CPTII,S$GLB,,

## 2023-06-14 RX ORDER — HEPARIN 100 UNIT/ML
500 SYRINGE INTRAVENOUS
Status: CANCELLED | OUTPATIENT
Start: 2023-06-14

## 2023-06-14 RX ORDER — SODIUM CHLORIDE 0.9 % (FLUSH) 0.9 %
10 SYRINGE (ML) INJECTION
Status: DISCONTINUED | OUTPATIENT
Start: 2023-06-14 | End: 2023-06-14 | Stop reason: HOSPADM

## 2023-06-14 RX ORDER — SODIUM CHLORIDE 0.9 % (FLUSH) 0.9 %
10 SYRINGE (ML) INJECTION
Status: CANCELLED | OUTPATIENT
Start: 2023-06-14

## 2023-06-14 RX ORDER — HEPARIN 100 UNIT/ML
500 SYRINGE INTRAVENOUS
Status: DISCONTINUED | OUTPATIENT
Start: 2023-06-14 | End: 2023-06-14 | Stop reason: HOSPADM

## 2023-06-14 RX ADMIN — SODIUM CHLORIDE 480 MG: 9 INJECTION, SOLUTION INTRAVENOUS at 02:06

## 2023-06-14 RX ADMIN — Medication 10 ML: at 02:06

## 2023-06-14 RX ADMIN — SODIUM CHLORIDE: 9 INJECTION, SOLUTION INTRAVENOUS at 02:06

## 2023-06-14 NOTE — PLAN OF CARE
Problem: Fatigue  Goal: Improved Activity Tolerance  Outcome: Ongoing, Progressing     Problem: Adult Inpatient Plan of Care  Goal: Plan of Care Review  Outcome: Met   Tolerated infusion of Opdivo well today Able to be d/c to home with instruction  IV d/c and pt ambulated to private vehicle without difficulty NAD

## 2023-06-15 DIAGNOSIS — E05.90 HYPERTHYROIDISM: Primary | ICD-10-CM
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
## 2023-06-26 ENCOUNTER — OFFICE VISIT (OUTPATIENT)
Dept: HEMATOLOGY/ONCOLOGY | Facility: CLINIC | Age: 83
End: 2023-06-26
Payer: COMMERCIAL

## 2023-06-26 ENCOUNTER — LAB VISIT (OUTPATIENT)
Dept: LAB | Facility: HOSPITAL | Age: 83
End: 2023-06-26
Attending: INTERNAL MEDICINE
Payer: COMMERCIAL

## 2023-06-26 VITALS
DIASTOLIC BLOOD PRESSURE: 60 MMHG | TEMPERATURE: 97 F | RESPIRATION RATE: 18 BRPM | SYSTOLIC BLOOD PRESSURE: 100 MMHG | BODY MASS INDEX: 26.38 KG/M2 | HEIGHT: 73 IN | WEIGHT: 199.06 LBS | HEART RATE: 53 BPM | OXYGEN SATURATION: 98 %

## 2023-06-26 DIAGNOSIS — Z79.899 IMMUNODEFICIENCY DUE TO DRUGS: ICD-10-CM

## 2023-06-26 DIAGNOSIS — R00.2 PALPITATIONS: ICD-10-CM

## 2023-06-26 DIAGNOSIS — E05.90 HYPERTHYROIDISM: ICD-10-CM

## 2023-06-26 DIAGNOSIS — R22.9 SUBCUTANEOUS NODULE: ICD-10-CM

## 2023-06-26 DIAGNOSIS — I48.0 PAROXYSMAL ATRIAL FIBRILLATION: ICD-10-CM

## 2023-06-26 DIAGNOSIS — C43.9 SKIN CANCER (MELANOMA): ICD-10-CM

## 2023-06-26 DIAGNOSIS — I10 PRIMARY HYPERTENSION: ICD-10-CM

## 2023-06-26 DIAGNOSIS — D84.821 IMMUNODEFICIENCY DUE TO DRUGS: ICD-10-CM

## 2023-06-26 DIAGNOSIS — C43.4 MALIGNANT MELANOMA OF SCALP: ICD-10-CM

## 2023-06-26 DIAGNOSIS — C43.4 MALIGNANT MELANOMA OF SCALP: Primary | ICD-10-CM

## 2023-06-26 LAB
ALBUMIN SERPL BCP-MCNC: 4 G/DL (ref 3.5–5.2)
ALP SERPL-CCNC: 93 U/L (ref 55–135)
ALT SERPL W/O P-5'-P-CCNC: 22 U/L (ref 10–44)
ANION GAP SERPL CALC-SCNC: 11 MMOL/L (ref 8–16)
AST SERPL-CCNC: 22 U/L (ref 10–40)
BASOPHILS # BLD AUTO: 0.05 K/UL (ref 0–0.2)
BASOPHILS NFR BLD: 1.1 % (ref 0–1.9)
BILIRUB SERPL-MCNC: 0.9 MG/DL (ref 0.1–1)
BUN SERPL-MCNC: 17 MG/DL (ref 8–23)
CALCIUM SERPL-MCNC: 9.7 MG/DL (ref 8.7–10.5)
CHLORIDE SERPL-SCNC: 103 MMOL/L (ref 95–110)
CO2 SERPL-SCNC: 28 MMOL/L (ref 23–29)
CREAT SERPL-MCNC: 1.4 MG/DL (ref 0.5–1.4)
DIFFERENTIAL METHOD: ABNORMAL
EOSINOPHIL # BLD AUTO: 0.3 K/UL (ref 0–0.5)
EOSINOPHIL NFR BLD: 6.3 % (ref 0–8)
ERYTHROCYTE [DISTWIDTH] IN BLOOD BY AUTOMATED COUNT: 14.1 % (ref 11.5–14.5)
EST. GFR  (NO RACE VARIABLE): 50.2 ML/MIN/1.73 M^2
GLUCOSE SERPL-MCNC: 88 MG/DL (ref 70–110)
HCT VFR BLD AUTO: 44.8 % (ref 40–54)
HGB BLD-MCNC: 15.2 G/DL (ref 14–18)
IMM GRANULOCYTES # BLD AUTO: 0.01 K/UL (ref 0–0.04)
IMM GRANULOCYTES NFR BLD AUTO: 0.2 % (ref 0–0.5)
LYMPHOCYTES # BLD AUTO: 0.9 K/UL (ref 1–4.8)
LYMPHOCYTES NFR BLD: 20 % (ref 18–48)
MCH RBC QN AUTO: 33.3 PG (ref 27–31)
MCHC RBC AUTO-ENTMCNC: 33.9 G/DL (ref 32–36)
MCV RBC AUTO: 98 FL (ref 82–98)
MONOCYTES # BLD AUTO: 0.4 K/UL (ref 0.3–1)
MONOCYTES NFR BLD: 9.9 % (ref 4–15)
NEUTROPHILS # BLD AUTO: 2.8 K/UL (ref 1.8–7.7)
NEUTROPHILS NFR BLD: 62.5 % (ref 38–73)
NRBC BLD-RTO: 0 /100 WBC
PLATELET # BLD AUTO: 176 K/UL (ref 150–450)
PMV BLD AUTO: 11.3 FL (ref 9.2–12.9)
POTASSIUM SERPL-SCNC: 4.2 MMOL/L (ref 3.5–5.1)
PROT SERPL-MCNC: 6.5 G/DL (ref 6–8.4)
RBC # BLD AUTO: 4.57 M/UL (ref 4.6–6.2)
SODIUM SERPL-SCNC: 142 MMOL/L (ref 136–145)
T3 SERPL-MCNC: 74 NG/DL (ref 60–180)
T4 FREE SERPL-MCNC: 1.18 NG/DL (ref 0.71–1.51)
TSH SERPL DL<=0.005 MIU/L-ACNC: 0.21 UIU/ML (ref 0.4–4)
WBC # BLD AUTO: 4.46 K/UL (ref 3.9–12.7)

## 2023-06-26 PROCEDURE — 3288F FALL RISK ASSESSMENT DOCD: CPT | Mod: CPTII,S$GLB,, | Performed by: INTERNAL MEDICINE

## 2023-06-26 PROCEDURE — 84480 ASSAY TRIIODOTHYRONINE (T3): CPT

## 2023-06-26 PROCEDURE — 99215 OFFICE O/P EST HI 40 MIN: CPT | Mod: S$GLB,,, | Performed by: INTERNAL MEDICINE

## 2023-06-26 PROCEDURE — 1101F PT FALLS ASSESS-DOCD LE1/YR: CPT | Mod: CPTII,S$GLB,, | Performed by: INTERNAL MEDICINE

## 2023-06-26 PROCEDURE — 84443 ASSAY THYROID STIM HORMONE: CPT | Performed by: INTERNAL MEDICINE

## 2023-06-26 PROCEDURE — 1126F AMNT PAIN NOTED NONE PRSNT: CPT | Mod: CPTII,S$GLB,, | Performed by: INTERNAL MEDICINE

## 2023-06-26 PROCEDURE — 84439 ASSAY OF FREE THYROXINE: CPT

## 2023-06-26 PROCEDURE — 3078F DIAST BP <80 MM HG: CPT | Mod: CPTII,S$GLB,, | Performed by: INTERNAL MEDICINE

## 2023-06-26 PROCEDURE — 3074F SYST BP LT 130 MM HG: CPT | Mod: CPTII,S$GLB,, | Performed by: INTERNAL MEDICINE

## 2023-06-26 PROCEDURE — 85025 COMPLETE CBC W/AUTO DIFF WBC: CPT | Mod: PN | Performed by: INTERNAL MEDICINE

## 2023-06-26 PROCEDURE — 99999 PR PBB SHADOW E&M-EST. PATIENT-LVL III: CPT | Mod: PBBFAC,,, | Performed by: INTERNAL MEDICINE

## 2023-06-26 PROCEDURE — 99215 PR OFFICE/OUTPT VISIT, EST, LEVL V, 40-54 MIN: ICD-10-PCS | Mod: S$GLB,,, | Performed by: INTERNAL MEDICINE

## 2023-06-26 PROCEDURE — 3288F PR FALLS RISK ASSESSMENT DOCUMENTED: ICD-10-PCS | Mod: CPTII,S$GLB,, | Performed by: INTERNAL MEDICINE

## 2023-06-26 PROCEDURE — 1126F PR PAIN SEVERITY QUANTIFIED, NO PAIN PRESENT: ICD-10-PCS | Mod: CPTII,S$GLB,, | Performed by: INTERNAL MEDICINE

## 2023-06-26 PROCEDURE — 36415 COLL VENOUS BLD VENIPUNCTURE: CPT | Mod: PN | Performed by: INTERNAL MEDICINE

## 2023-06-26 PROCEDURE — 1101F PR PT FALLS ASSESS DOC 0-1 FALLS W/OUT INJ PAST YR: ICD-10-PCS | Mod: CPTII,S$GLB,, | Performed by: INTERNAL MEDICINE

## 2023-06-26 PROCEDURE — 80053 COMPREHEN METABOLIC PANEL: CPT | Mod: PN | Performed by: INTERNAL MEDICINE

## 2023-06-26 PROCEDURE — 3078F PR MOST RECENT DIASTOLIC BLOOD PRESSURE < 80 MM HG: ICD-10-PCS | Mod: CPTII,S$GLB,, | Performed by: INTERNAL MEDICINE

## 2023-06-26 PROCEDURE — 3074F PR MOST RECENT SYSTOLIC BLOOD PRESSURE < 130 MM HG: ICD-10-PCS | Mod: CPTII,S$GLB,, | Performed by: INTERNAL MEDICINE

## 2023-06-26 PROCEDURE — 99999 PR PBB SHADOW E&M-EST. PATIENT-LVL III: ICD-10-PCS | Mod: PBBFAC,,, | Performed by: INTERNAL MEDICINE

## 2023-06-26 RX ORDER — HEPARIN 100 UNIT/ML
500 SYRINGE INTRAVENOUS
Status: CANCELLED | OUTPATIENT
Start: 2023-07-24

## 2023-06-26 RX ORDER — SODIUM CHLORIDE 0.9 % (FLUSH) 0.9 %
10 SYRINGE (ML) INJECTION
Status: CANCELLED | OUTPATIENT
Start: 2023-07-24
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
## 2023-06-29 ENCOUNTER — OFFICE VISIT (OUTPATIENT)
Dept: HEMATOLOGY/ONCOLOGY | Facility: CLINIC | Age: 83
End: 2023-06-29
Payer: COMMERCIAL

## 2023-06-29 VITALS
DIASTOLIC BLOOD PRESSURE: 69 MMHG | WEIGHT: 201.75 LBS | HEIGHT: 73 IN | HEART RATE: 57 BPM | OXYGEN SATURATION: 98 % | TEMPERATURE: 98 F | SYSTOLIC BLOOD PRESSURE: 131 MMHG | BODY MASS INDEX: 26.74 KG/M2 | RESPIRATION RATE: 18 BRPM

## 2023-06-29 DIAGNOSIS — C43.4 MALIGNANT MELANOMA OF SCALP: Primary | ICD-10-CM

## 2023-06-29 DIAGNOSIS — S01.00XS OPEN WOUND OF SCALP, UNSPECIFIED OPEN WOUND TYPE, SEQUELA: ICD-10-CM

## 2023-06-29 PROCEDURE — 1159F PR MEDICATION LIST DOCUMENTED IN MEDICAL RECORD: ICD-10-PCS | Mod: CPTII,S$GLB,, | Performed by: OTOLARYNGOLOGY

## 2023-06-29 PROCEDURE — 1101F PT FALLS ASSESS-DOCD LE1/YR: CPT | Mod: CPTII,S$GLB,, | Performed by: OTOLARYNGOLOGY

## 2023-06-29 PROCEDURE — 1101F PR PT FALLS ASSESS DOC 0-1 FALLS W/OUT INJ PAST YR: ICD-10-PCS | Mod: CPTII,S$GLB,, | Performed by: OTOLARYNGOLOGY

## 2023-06-29 PROCEDURE — 3075F SYST BP GE 130 - 139MM HG: CPT | Mod: CPTII,S$GLB,, | Performed by: OTOLARYNGOLOGY

## 2023-06-29 PROCEDURE — 3288F PR FALLS RISK ASSESSMENT DOCUMENTED: ICD-10-PCS | Mod: CPTII,S$GLB,, | Performed by: OTOLARYNGOLOGY

## 2023-06-29 PROCEDURE — 99024 PR POST-OP FOLLOW-UP VISIT: ICD-10-PCS | Mod: S$GLB,,, | Performed by: OTOLARYNGOLOGY

## 2023-06-29 PROCEDURE — 99999 PR PBB SHADOW E&M-EST. PATIENT-LVL IV: CPT | Mod: PBBFAC,,, | Performed by: OTOLARYNGOLOGY

## 2023-06-29 PROCEDURE — 1126F AMNT PAIN NOTED NONE PRSNT: CPT | Mod: CPTII,S$GLB,, | Performed by: OTOLARYNGOLOGY

## 2023-06-29 PROCEDURE — 3078F DIAST BP <80 MM HG: CPT | Mod: CPTII,S$GLB,, | Performed by: OTOLARYNGOLOGY

## 2023-06-29 PROCEDURE — 1159F MED LIST DOCD IN RCRD: CPT | Mod: CPTII,S$GLB,, | Performed by: OTOLARYNGOLOGY

## 2023-06-29 PROCEDURE — 1126F PR PAIN SEVERITY QUANTIFIED, NO PAIN PRESENT: ICD-10-PCS | Mod: CPTII,S$GLB,, | Performed by: OTOLARYNGOLOGY

## 2023-06-29 PROCEDURE — 3078F PR MOST RECENT DIASTOLIC BLOOD PRESSURE < 80 MM HG: ICD-10-PCS | Mod: CPTII,S$GLB,, | Performed by: OTOLARYNGOLOGY

## 2023-06-29 PROCEDURE — 99024 POSTOP FOLLOW-UP VISIT: CPT | Mod: S$GLB,,, | Performed by: OTOLARYNGOLOGY

## 2023-06-29 PROCEDURE — 99999 PR PBB SHADOW E&M-EST. PATIENT-LVL IV: ICD-10-PCS | Mod: PBBFAC,,, | Performed by: OTOLARYNGOLOGY

## 2023-06-29 PROCEDURE — 3288F FALL RISK ASSESSMENT DOCD: CPT | Mod: CPTII,S$GLB,, | Performed by: OTOLARYNGOLOGY

## 2023-06-29 PROCEDURE — 3075F PR MOST RECENT SYSTOLIC BLOOD PRESS GE 130-139MM HG: ICD-10-PCS | Mod: CPTII,S$GLB,, | Performed by: OTOLARYNGOLOGY

## 2023-07-18 ENCOUNTER — HOSPITAL ENCOUNTER (OUTPATIENT)
Dept: RADIOLOGY | Facility: HOSPITAL | Age: 83
Discharge: HOME | End: 2023-07-18
Attending: INTERNAL MEDICINE
Payer: COMMERCIAL

## 2023-07-18 DIAGNOSIS — C43.9 SKIN CANCER (MELANOMA): ICD-10-CM

## 2023-07-18 LAB — GLUCOSE SERPL-MCNC: 88 MG/DL (ref 70–110)

## 2023-07-18 PROCEDURE — A9552 F18 FDG: HCPCS | Mod: PN

## 2023-07-18 PROCEDURE — 78816 PET IMAGE W/CT FULL BODY: CPT | Mod: TC,PN

## 2023-07-18 PROCEDURE — 78816 PET IMAGE W/CT FULL BODY: CPT | Mod: 26,PS,, | Performed by: RADIOLOGY

## 2023-07-18 PROCEDURE — 78816 NM PET CT WHOLE BODY: ICD-10-PCS | Mod: 26,PS,, | Performed by: RADIOLOGY
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
## 2023-07-21 ENCOUNTER — TELEPHONE (OUTPATIENT)
Dept: INFUSION THERAPY | Facility: HOSPITAL | Age: 83
End: 2023-07-21
Payer: COMMERCIAL

## 2023-07-21 ENCOUNTER — LAB VISIT (OUTPATIENT)
Dept: LAB | Facility: HOSPITAL | Age: 83
End: 2023-07-21
Attending: INTERNAL MEDICINE
Payer: COMMERCIAL

## 2023-07-21 ENCOUNTER — OFFICE VISIT (OUTPATIENT)
Dept: HEMATOLOGY/ONCOLOGY | Facility: CLINIC | Age: 83
End: 2023-07-21
Payer: COMMERCIAL

## 2023-07-21 VITALS
BODY MASS INDEX: 26.3 KG/M2 | WEIGHT: 198.44 LBS | DIASTOLIC BLOOD PRESSURE: 70 MMHG | TEMPERATURE: 97 F | OXYGEN SATURATION: 100 % | HEIGHT: 73 IN | HEART RATE: 55 BPM | SYSTOLIC BLOOD PRESSURE: 130 MMHG

## 2023-07-21 DIAGNOSIS — I48.0 PAROXYSMAL ATRIAL FIBRILLATION: ICD-10-CM

## 2023-07-21 DIAGNOSIS — C43.9 SKIN CANCER (MELANOMA): ICD-10-CM

## 2023-07-21 DIAGNOSIS — C43.4 MALIGNANT MELANOMA OF SCALP: Primary | ICD-10-CM

## 2023-07-21 DIAGNOSIS — Z79.899 IMMUNODEFICIENCY DUE TO DRUGS: ICD-10-CM

## 2023-07-21 DIAGNOSIS — D84.821 IMMUNODEFICIENCY DUE TO DRUGS: ICD-10-CM

## 2023-07-21 DIAGNOSIS — R22.9 SUBCUTANEOUS NODULE: ICD-10-CM

## 2023-07-21 DIAGNOSIS — I10 PRIMARY HYPERTENSION: ICD-10-CM

## 2023-07-21 LAB
ALBUMIN SERPL BCP-MCNC: 3.9 G/DL (ref 3.5–5.2)
ALP SERPL-CCNC: 88 U/L (ref 55–135)
ALT SERPL W/O P-5'-P-CCNC: 16 U/L (ref 10–44)
ANION GAP SERPL CALC-SCNC: 10 MMOL/L (ref 8–16)
AST SERPL-CCNC: 21 U/L (ref 10–40)
BASOPHILS # BLD AUTO: 0.05 K/UL (ref 0–0.2)
BASOPHILS NFR BLD: 1.3 % (ref 0–1.9)
BILIRUB SERPL-MCNC: 1 MG/DL (ref 0.1–1)
BUN SERPL-MCNC: 15 MG/DL (ref 8–23)
CALCIUM SERPL-MCNC: 9.5 MG/DL (ref 8.7–10.5)
CHLORIDE SERPL-SCNC: 103 MMOL/L (ref 95–110)
CO2 SERPL-SCNC: 27 MMOL/L (ref 23–29)
CREAT SERPL-MCNC: 1.4 MG/DL (ref 0.5–1.4)
DIFFERENTIAL METHOD: ABNORMAL
EOSINOPHIL # BLD AUTO: 0.3 K/UL (ref 0–0.5)
EOSINOPHIL NFR BLD: 7.3 % (ref 0–8)
ERYTHROCYTE [DISTWIDTH] IN BLOOD BY AUTOMATED COUNT: 14.2 % (ref 11.5–14.5)
EST. GFR  (NO RACE VARIABLE): 50.2 ML/MIN/1.73 M^2
GLUCOSE SERPL-MCNC: 97 MG/DL (ref 70–110)
HCT VFR BLD AUTO: 44.6 % (ref 40–54)
HGB BLD-MCNC: 14.8 G/DL (ref 14–18)
IMM GRANULOCYTES # BLD AUTO: 0.01 K/UL (ref 0–0.04)
IMM GRANULOCYTES NFR BLD AUTO: 0.3 % (ref 0–0.5)
LYMPHOCYTES # BLD AUTO: 1 K/UL (ref 1–4.8)
LYMPHOCYTES NFR BLD: 24.4 % (ref 18–48)
MCH RBC QN AUTO: 32.7 PG (ref 27–31)
MCHC RBC AUTO-ENTMCNC: 33.2 G/DL (ref 32–36)
MCV RBC AUTO: 99 FL (ref 82–98)
MONOCYTES # BLD AUTO: 0.4 K/UL (ref 0.3–1)
MONOCYTES NFR BLD: 9.8 % (ref 4–15)
NEUTROPHILS # BLD AUTO: 2.3 K/UL (ref 1.8–7.7)
NEUTROPHILS NFR BLD: 56.9 % (ref 38–73)
NRBC BLD-RTO: 0 /100 WBC
PLATELET # BLD AUTO: 195 K/UL (ref 150–450)
PMV BLD AUTO: 10.9 FL (ref 9.2–12.9)
POTASSIUM SERPL-SCNC: 4.4 MMOL/L (ref 3.5–5.1)
PROT SERPL-MCNC: 6.4 G/DL (ref 6–8.4)
RBC # BLD AUTO: 4.53 M/UL (ref 4.6–6.2)
SODIUM SERPL-SCNC: 140 MMOL/L (ref 136–145)
TSH SERPL DL<=0.005 MIU/L-ACNC: 0.48 UIU/ML (ref 0.4–4)
WBC # BLD AUTO: 3.98 K/UL (ref 3.9–12.7)

## 2023-07-21 PROCEDURE — 1101F PR PT FALLS ASSESS DOC 0-1 FALLS W/OUT INJ PAST YR: ICD-10-PCS | Mod: CPTII,S$GLB,, | Performed by: INTERNAL MEDICINE

## 2023-07-21 PROCEDURE — 99999 PR PBB SHADOW E&M-EST. PATIENT-LVL III: ICD-10-PCS | Mod: PBBFAC,,, | Performed by: INTERNAL MEDICINE

## 2023-07-21 PROCEDURE — 1101F PT FALLS ASSESS-DOCD LE1/YR: CPT | Mod: CPTII,S$GLB,, | Performed by: INTERNAL MEDICINE

## 2023-07-21 PROCEDURE — 3078F DIAST BP <80 MM HG: CPT | Mod: CPTII,S$GLB,, | Performed by: INTERNAL MEDICINE

## 2023-07-21 PROCEDURE — 3288F PR FALLS RISK ASSESSMENT DOCUMENTED: ICD-10-PCS | Mod: CPTII,S$GLB,, | Performed by: INTERNAL MEDICINE

## 2023-07-21 PROCEDURE — 85025 COMPLETE CBC W/AUTO DIFF WBC: CPT | Mod: PN | Performed by: INTERNAL MEDICINE

## 2023-07-21 PROCEDURE — 99215 OFFICE O/P EST HI 40 MIN: CPT | Mod: S$GLB,,, | Performed by: INTERNAL MEDICINE

## 2023-07-21 PROCEDURE — 1126F PR PAIN SEVERITY QUANTIFIED, NO PAIN PRESENT: ICD-10-PCS | Mod: CPTII,S$GLB,, | Performed by: INTERNAL MEDICINE

## 2023-07-21 PROCEDURE — 1126F AMNT PAIN NOTED NONE PRSNT: CPT | Mod: CPTII,S$GLB,, | Performed by: INTERNAL MEDICINE

## 2023-07-21 PROCEDURE — 84443 ASSAY THYROID STIM HORMONE: CPT | Performed by: INTERNAL MEDICINE

## 2023-07-21 PROCEDURE — 99215 PR OFFICE/OUTPT VISIT, EST, LEVL V, 40-54 MIN: ICD-10-PCS | Mod: S$GLB,,, | Performed by: INTERNAL MEDICINE

## 2023-07-21 PROCEDURE — 36415 COLL VENOUS BLD VENIPUNCTURE: CPT | Mod: PN | Performed by: INTERNAL MEDICINE

## 2023-07-21 PROCEDURE — 1159F PR MEDICATION LIST DOCUMENTED IN MEDICAL RECORD: ICD-10-PCS | Mod: CPTII,S$GLB,, | Performed by: INTERNAL MEDICINE

## 2023-07-21 PROCEDURE — 3288F FALL RISK ASSESSMENT DOCD: CPT | Mod: CPTII,S$GLB,, | Performed by: INTERNAL MEDICINE

## 2023-07-21 PROCEDURE — 1159F MED LIST DOCD IN RCRD: CPT | Mod: CPTII,S$GLB,, | Performed by: INTERNAL MEDICINE

## 2023-07-21 PROCEDURE — 3075F SYST BP GE 130 - 139MM HG: CPT | Mod: CPTII,S$GLB,, | Performed by: INTERNAL MEDICINE

## 2023-07-21 PROCEDURE — 99999 PR PBB SHADOW E&M-EST. PATIENT-LVL III: CPT | Mod: PBBFAC,,, | Performed by: INTERNAL MEDICINE

## 2023-07-21 PROCEDURE — 3078F PR MOST RECENT DIASTOLIC BLOOD PRESSURE < 80 MM HG: ICD-10-PCS | Mod: CPTII,S$GLB,, | Performed by: INTERNAL MEDICINE

## 2023-07-21 PROCEDURE — 80053 COMPREHEN METABOLIC PANEL: CPT | Mod: PN | Performed by: INTERNAL MEDICINE

## 2023-07-21 PROCEDURE — 3075F PR MOST RECENT SYSTOLIC BLOOD PRESS GE 130-139MM HG: ICD-10-PCS | Mod: CPTII,S$GLB,, | Performed by: INTERNAL MEDICINE

## 2023-07-21 NOTE — TELEPHONE ENCOUNTER
SW called pt per request from Dr Hart to speak with pt about medical bills he voiced concerns about. Pt's wife Nohemi said they were driving and asked if she can call this SW back when they returned home.     Monica Meeks, BAKARIW

## 2023-07-21 NOTE — TELEPHONE ENCOUNTER
Pt returned SW's call. Pt has several bills that he is concerned about. He agreed to bring this bills to his next infusion treatment. SW will help pt review the information and will include financial counselor if needed. Pt agreed to plan.     Monica Meeks, SAAD
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
## 2023-07-24 ENCOUNTER — DOCUMENTATION ONLY (OUTPATIENT)
Dept: INFUSION THERAPY | Facility: HOSPITAL | Age: 83
End: 2023-07-24
Payer: COMMERCIAL

## 2023-07-24 ENCOUNTER — INFUSION (OUTPATIENT)
Dept: INFUSION THERAPY | Facility: HOSPITAL | Age: 83
End: 2023-07-24
Attending: INTERNAL MEDICINE
Payer: COMMERCIAL

## 2023-07-24 VITALS
SYSTOLIC BLOOD PRESSURE: 127 MMHG | HEIGHT: 73 IN | WEIGHT: 199.31 LBS | DIASTOLIC BLOOD PRESSURE: 64 MMHG | RESPIRATION RATE: 18 BRPM | HEART RATE: 53 BPM | TEMPERATURE: 98 F | BODY MASS INDEX: 26.42 KG/M2

## 2023-07-24 DIAGNOSIS — C43.4 MALIGNANT MELANOMA OF SCALP: Primary | ICD-10-CM

## 2023-07-24 PROCEDURE — 96413 CHEMO IV INFUSION 1 HR: CPT | Mod: PN

## 2023-07-24 PROCEDURE — 25000003 PHARM REV CODE 250: Mod: PN | Performed by: INTERNAL MEDICINE

## 2023-07-24 PROCEDURE — 63600175 PHARM REV CODE 636 W HCPCS: Mod: JZ,JG,PN | Performed by: INTERNAL MEDICINE

## 2023-07-24 RX ORDER — HEPARIN 100 UNIT/ML
500 SYRINGE INTRAVENOUS
Status: DISCONTINUED | OUTPATIENT
Start: 2023-07-24 | End: 2023-07-24 | Stop reason: HOSPADM

## 2023-07-24 RX ORDER — SODIUM CHLORIDE 0.9 % (FLUSH) 0.9 %
10 SYRINGE (ML) INJECTION
Status: DISCONTINUED | OUTPATIENT
Start: 2023-07-24 | End: 2023-07-24 | Stop reason: HOSPADM

## 2023-07-24 RX ADMIN — SODIUM CHLORIDE: 9 INJECTION, SOLUTION INTRAVENOUS at 01:07

## 2023-07-24 RX ADMIN — SODIUM CHLORIDE 480 MG: 9 INJECTION, SOLUTION INTRAVENOUS at 02:07

## 2023-07-24 NOTE — PLAN OF CARE
Problem: Adult Inpatient Plan of Care  Goal: Patient-Specific Goal (Individualized)  Outcome: Ongoing, Progressing  Flowsheets (Taken 7/24/2023 1345)  Anxieties, Fears or Concerns: none  Individualized Care Needs: recliner, warm blanket, pillow     Problem: Fatigue  Goal: Improved Activity Tolerance  Intervention: Promote Improved Energy  Flowsheets (Taken 7/24/2023 1345)  Fatigue Management: paced activity encouraged  Sleep/Rest Enhancement:   family presence promoted   noise level reduced   natural light exposure provided  Activity Management: Ambulated -L4     Pt tolerated opdivo, VSS. Pt voiced no new complaints or concerns at this time. NAD noted. Pt d/c home.

## 2023-07-24 NOTE — PROGRESS NOTES
Oncology   Chemotherapy Infusion Visit    Quick Social Service Status Follow Up   Met w/ pt briefly to follow up on social and emotional needs.  SW met with pt and wife at chairside to follow up on concerns he had over medical bills. Pt is worried about a bill on 4/20 showing a charge for Dr Hart visit. This charge is usually covered. CHERI brought pt's concerns to Areli the financial counselor to review. She reviewed the EOB for the DOS and will contact someone regarding the error codes listed. She will follow up with pt.   CHERI informed pt Areli is looking into he matter further and will follow up . She also said th ept is welcome to call her for follow up and questions. CHERI provided pt her card.     CHERI provided pt wit a gas card. No other needs noted at this time.        Monica Meeks, SAAD  07/24/2023  3:13 PM
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
## 2023-08-21 ENCOUNTER — DOCUMENTATION ONLY (OUTPATIENT)
Dept: INFUSION THERAPY | Facility: HOSPITAL | Age: 83
End: 2023-08-21

## 2023-08-21 ENCOUNTER — INFUSION (OUTPATIENT)
Dept: INFUSION THERAPY | Facility: HOSPITAL | Age: 83
End: 2023-08-21
Attending: INTERNAL MEDICINE
Payer: COMMERCIAL

## 2023-08-21 ENCOUNTER — OFFICE VISIT (OUTPATIENT)
Dept: HEMATOLOGY/ONCOLOGY | Facility: CLINIC | Age: 83
End: 2023-08-21
Payer: COMMERCIAL

## 2023-08-21 ENCOUNTER — LAB VISIT (OUTPATIENT)
Dept: LAB | Facility: HOSPITAL | Age: 83
End: 2023-08-21
Payer: COMMERCIAL

## 2023-08-21 VITALS
DIASTOLIC BLOOD PRESSURE: 53 MMHG | TEMPERATURE: 97 F | OXYGEN SATURATION: 100 % | RESPIRATION RATE: 16 BRPM | HEART RATE: 50 BPM | BODY MASS INDEX: 26.12 KG/M2 | HEIGHT: 73 IN | WEIGHT: 197.06 LBS | SYSTOLIC BLOOD PRESSURE: 113 MMHG

## 2023-08-21 VITALS
HEIGHT: 73 IN | RESPIRATION RATE: 16 BRPM | BODY MASS INDEX: 26.12 KG/M2 | SYSTOLIC BLOOD PRESSURE: 146 MMHG | DIASTOLIC BLOOD PRESSURE: 68 MMHG | WEIGHT: 197.06 LBS | HEART RATE: 51 BPM | TEMPERATURE: 98 F

## 2023-08-21 DIAGNOSIS — C43.4 MALIGNANT MELANOMA OF SCALP: Primary | ICD-10-CM

## 2023-08-21 DIAGNOSIS — I10 PRIMARY HYPERTENSION: ICD-10-CM

## 2023-08-21 DIAGNOSIS — C43.4 MALIGNANT MELANOMA OF SCALP: ICD-10-CM

## 2023-08-21 DIAGNOSIS — S01.00XS OPEN WOUND OF SCALP, UNSPECIFIED OPEN WOUND TYPE, SEQUELA: ICD-10-CM

## 2023-08-21 DIAGNOSIS — I48.0 PAROXYSMAL ATRIAL FIBRILLATION: ICD-10-CM

## 2023-08-21 DIAGNOSIS — D84.821 IMMUNODEFICIENCY DUE TO DRUGS: ICD-10-CM

## 2023-08-21 DIAGNOSIS — Z79.899 IMMUNODEFICIENCY DUE TO DRUGS: ICD-10-CM

## 2023-08-21 LAB
ALBUMIN SERPL BCP-MCNC: 3.9 G/DL (ref 3.5–5.2)
ALP SERPL-CCNC: 79 U/L (ref 55–135)
ALT SERPL W/O P-5'-P-CCNC: 14 U/L (ref 10–44)
ANION GAP SERPL CALC-SCNC: 13 MMOL/L (ref 8–16)
AST SERPL-CCNC: 20 U/L (ref 10–40)
BASOPHILS # BLD AUTO: 0.05 K/UL (ref 0–0.2)
BASOPHILS NFR BLD: 1.3 % (ref 0–1.9)
BILIRUB SERPL-MCNC: 1.1 MG/DL (ref 0.1–1)
BUN SERPL-MCNC: 19 MG/DL (ref 8–23)
CALCIUM SERPL-MCNC: 9.5 MG/DL (ref 8.7–10.5)
CHLORIDE SERPL-SCNC: 102 MMOL/L (ref 95–110)
CO2 SERPL-SCNC: 24 MMOL/L (ref 23–29)
CREAT SERPL-MCNC: 1.4 MG/DL (ref 0.5–1.4)
DIFFERENTIAL METHOD: ABNORMAL
EOSINOPHIL # BLD AUTO: 0.2 K/UL (ref 0–0.5)
EOSINOPHIL NFR BLD: 6.1 % (ref 0–8)
ERYTHROCYTE [DISTWIDTH] IN BLOOD BY AUTOMATED COUNT: 14.5 % (ref 11.5–14.5)
EST. GFR  (NO RACE VARIABLE): 50.2 ML/MIN/1.73 M^2
GLUCOSE SERPL-MCNC: 91 MG/DL (ref 70–110)
HCT VFR BLD AUTO: 43.8 % (ref 40–54)
HGB BLD-MCNC: 14.6 G/DL (ref 14–18)
IMM GRANULOCYTES # BLD AUTO: 0.01 K/UL (ref 0–0.04)
IMM GRANULOCYTES NFR BLD AUTO: 0.3 % (ref 0–0.5)
LYMPHOCYTES # BLD AUTO: 0.7 K/UL (ref 1–4.8)
LYMPHOCYTES NFR BLD: 19.3 % (ref 18–48)
MCH RBC QN AUTO: 33.5 PG (ref 27–31)
MCHC RBC AUTO-ENTMCNC: 33.3 G/DL (ref 32–36)
MCV RBC AUTO: 101 FL (ref 82–98)
MONOCYTES # BLD AUTO: 0.4 K/UL (ref 0.3–1)
MONOCYTES NFR BLD: 9.6 % (ref 4–15)
NEUTROPHILS # BLD AUTO: 2.4 K/UL (ref 1.8–7.7)
NEUTROPHILS NFR BLD: 63.4 % (ref 38–73)
NRBC BLD-RTO: 0 /100 WBC
PLATELET # BLD AUTO: 188 K/UL (ref 150–450)
PMV BLD AUTO: 11 FL (ref 9.2–12.9)
POTASSIUM SERPL-SCNC: 4 MMOL/L (ref 3.5–5.1)
PROT SERPL-MCNC: 6.4 G/DL (ref 6–8.4)
RBC # BLD AUTO: 4.36 M/UL (ref 4.6–6.2)
SODIUM SERPL-SCNC: 139 MMOL/L (ref 136–145)
TSH SERPL DL<=0.005 MIU/L-ACNC: 1.64 UIU/ML (ref 0.4–4)
WBC # BLD AUTO: 3.74 K/UL (ref 3.9–12.7)

## 2023-08-21 PROCEDURE — 84443 ASSAY THYROID STIM HORMONE: CPT | Performed by: INTERNAL MEDICINE

## 2023-08-21 PROCEDURE — 1159F PR MEDICATION LIST DOCUMENTED IN MEDICAL RECORD: ICD-10-PCS | Mod: CPTII,S$GLB,,

## 2023-08-21 PROCEDURE — 25000003 PHARM REV CODE 250: Mod: PN

## 2023-08-21 PROCEDURE — 1126F PR PAIN SEVERITY QUANTIFIED, NO PAIN PRESENT: ICD-10-PCS | Mod: CPTII,S$GLB,,

## 2023-08-21 PROCEDURE — 3074F PR MOST RECENT SYSTOLIC BLOOD PRESSURE < 130 MM HG: ICD-10-PCS | Mod: CPTII,S$GLB,,

## 2023-08-21 PROCEDURE — 3078F DIAST BP <80 MM HG: CPT | Mod: CPTII,S$GLB,,

## 2023-08-21 PROCEDURE — 96413 CHEMO IV INFUSION 1 HR: CPT | Mod: PN

## 2023-08-21 PROCEDURE — 85025 COMPLETE CBC W/AUTO DIFF WBC: CPT | Mod: PN | Performed by: INTERNAL MEDICINE

## 2023-08-21 PROCEDURE — 80053 COMPREHEN METABOLIC PANEL: CPT | Mod: PN | Performed by: INTERNAL MEDICINE

## 2023-08-21 PROCEDURE — 99999 PR PBB SHADOW E&M-EST. PATIENT-LVL IV: CPT | Mod: PBBFAC,,,

## 2023-08-21 PROCEDURE — 99999 PR PBB SHADOW E&M-EST. PATIENT-LVL IV: ICD-10-PCS | Mod: PBBFAC,,,

## 2023-08-21 PROCEDURE — 1126F AMNT PAIN NOTED NONE PRSNT: CPT | Mod: CPTII,S$GLB,,

## 2023-08-21 PROCEDURE — 1101F PR PT FALLS ASSESS DOC 0-1 FALLS W/OUT INJ PAST YR: ICD-10-PCS | Mod: CPTII,S$GLB,,

## 2023-08-21 PROCEDURE — 3288F PR FALLS RISK ASSESSMENT DOCUMENTED: ICD-10-PCS | Mod: CPTII,S$GLB,,

## 2023-08-21 PROCEDURE — 3074F SYST BP LT 130 MM HG: CPT | Mod: CPTII,S$GLB,,

## 2023-08-21 PROCEDURE — 36415 COLL VENOUS BLD VENIPUNCTURE: CPT | Mod: PN | Performed by: INTERNAL MEDICINE

## 2023-08-21 PROCEDURE — 1101F PT FALLS ASSESS-DOCD LE1/YR: CPT | Mod: CPTII,S$GLB,,

## 2023-08-21 PROCEDURE — 1159F MED LIST DOCD IN RCRD: CPT | Mod: CPTII,S$GLB,,

## 2023-08-21 PROCEDURE — 99213 PR OFFICE/OUTPT VISIT, EST, LEVL III, 20-29 MIN: ICD-10-PCS | Mod: S$GLB,,,

## 2023-08-21 PROCEDURE — 63600175 PHARM REV CODE 636 W HCPCS: Mod: JZ,JG,PN

## 2023-08-21 PROCEDURE — 3078F PR MOST RECENT DIASTOLIC BLOOD PRESSURE < 80 MM HG: ICD-10-PCS | Mod: CPTII,S$GLB,,

## 2023-08-21 PROCEDURE — 3288F FALL RISK ASSESSMENT DOCD: CPT | Mod: CPTII,S$GLB,,

## 2023-08-21 PROCEDURE — 99213 OFFICE O/P EST LOW 20 MIN: CPT | Mod: S$GLB,,,

## 2023-08-21 RX ORDER — SODIUM CHLORIDE 0.9 % (FLUSH) 0.9 %
10 SYRINGE (ML) INJECTION
Status: CANCELLED | OUTPATIENT
Start: 2023-08-21

## 2023-08-21 RX ORDER — HEPARIN 100 UNIT/ML
500 SYRINGE INTRAVENOUS
Status: DISCONTINUED | OUTPATIENT
Start: 2023-08-21 | End: 2023-08-21 | Stop reason: HOSPADM

## 2023-08-21 RX ORDER — SODIUM CHLORIDE 0.9 % (FLUSH) 0.9 %
10 SYRINGE (ML) INJECTION
Status: DISCONTINUED | OUTPATIENT
Start: 2023-08-21 | End: 2023-08-21 | Stop reason: HOSPADM

## 2023-08-21 RX ORDER — HEPARIN 100 UNIT/ML
500 SYRINGE INTRAVENOUS
Status: CANCELLED | OUTPATIENT
Start: 2023-08-21

## 2023-08-21 RX ADMIN — SODIUM CHLORIDE 480 MG: 9 INJECTION, SOLUTION INTRAVENOUS at 11:08

## 2023-08-21 RX ADMIN — SODIUM CHLORIDE: 9 INJECTION, SOLUTION INTRAVENOUS at 10:08

## 2023-08-21 NOTE — PROGRESS NOTES
Oncology Nutrition   Chemotherapy Infusion Visit    Nutrition Follow Up   RD met with pt and pt's spouse, Nohemi, at chairside during infusion tx. Pt continues to do well nutritionally - maintaining weight, chewing without difficulty and denies any nutrition related side effects. Pt states he is drinking a shot of prune juice every morning to aid with constipation. He reports this helps greatly. Pt weight is fluctuating but overall stable. Pt pleasant to speak with.         Wt Readings from Last 10 Encounters:   08/21/23 89.4 kg (197 lb 1.5 oz)   08/21/23 89.4 kg (197 lb 1.5 oz)   07/24/23 90.4 kg (199 lb 4.7 oz)   07/21/23 90 kg (198 lb 6.6 oz)   06/29/23 91.5 kg (201 lb 11.5 oz)   06/26/23 90.3 kg (199 lb 1.2 oz)   06/14/23 91.3 kg (201 lb 4.5 oz)   06/14/23 91.3 kg (201 lb 4.5 oz)   05/18/23 91.9 kg (202 lb 9.6 oz)   05/18/23 91.9 kg (202 lb 9.6 oz)       All other nutrition questions/concerns addressed as appropriate. Will continue to monitor prn throughout treatment.     Lillie Jordan, RAIMUNDO, LDN  08/21/2023  2:26 PM

## 2023-08-21 NOTE — PROGRESS NOTES
Oncology   Chemotherapy Infusion Visit    Quick Social Service Status Follow Up   Met w/ pt briefly to follow up on social and emotional needs. SW met with pt and wife. Followed up on bills he was concerned about at last visit. He said he has spoken with insurance and it should be fine. He plans to follow up with Areli to be sure. Denied any assistance needed from SW regarding bills at this time.     SW provided pt with a gas card. Pt thankful.                 Monica Meeks, SAAD  08/21/2023  3:45 PM

## 2023-08-21 NOTE — PLAN OF CARE
Tolerated opdivo well.  No reactions noted.  No questions or concerns at this time.  Ambulated off unit in NAD

## 2023-09-14 ENCOUNTER — TELEPHONE (OUTPATIENT)
Dept: HEMATOLOGY/ONCOLOGY | Facility: CLINIC | Age: 83
End: 2023-09-14
Payer: COMMERCIAL

## 2023-09-14 DIAGNOSIS — R21 SKIN RASH: Primary | ICD-10-CM

## 2023-09-14 RX ORDER — PREDNISONE 20 MG/1
TABLET ORAL
Qty: 28 TABLET | Refills: 0 | Status: SHIPPED | OUTPATIENT
Start: 2023-09-14 | End: 2023-09-28

## 2023-09-14 NOTE — TELEPHONE ENCOUNTER
Spoke with patient. He notes past 4 doses of opdivo having pruritic rash, which is progressively getting worse. So much so, he can barely sleep at night.  Spoke with sayra---will start steroids. No immuno next week. Will see him next week to assess how he is going and come up with game plan.    Spoke with patient and notified him of all the above.

## 2023-09-14 NOTE — PROGRESS NOTES
Patient called clinic with c/o worsening rash on his torso and extremities. He is using triamcinolone cream without relief. Advised that we will start him on Prednisone 1mg/kg/day (80 mg) daily x 7 days and taper as directed. He should keep his appointment with me on Tuesday 9/19/23. We will plan to hold treatment that day.
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
## 2023-09-19 ENCOUNTER — LAB VISIT (OUTPATIENT)
Dept: LAB | Facility: HOSPITAL | Age: 83
End: 2023-09-19
Attending: INTERNAL MEDICINE
Payer: COMMERCIAL

## 2023-09-19 ENCOUNTER — OFFICE VISIT (OUTPATIENT)
Dept: HEMATOLOGY/ONCOLOGY | Facility: CLINIC | Age: 83
End: 2023-09-19
Payer: COMMERCIAL

## 2023-09-19 VITALS
BODY MASS INDEX: 25.83 KG/M2 | HEART RATE: 46 BPM | TEMPERATURE: 98 F | HEIGHT: 73 IN | WEIGHT: 194.88 LBS | DIASTOLIC BLOOD PRESSURE: 76 MMHG | SYSTOLIC BLOOD PRESSURE: 120 MMHG | RESPIRATION RATE: 16 BRPM | OXYGEN SATURATION: 100 %

## 2023-09-19 DIAGNOSIS — I48.0 PAROXYSMAL ATRIAL FIBRILLATION: ICD-10-CM

## 2023-09-19 DIAGNOSIS — C43.4 MALIGNANT MELANOMA OF SCALP: Primary | ICD-10-CM

## 2023-09-19 DIAGNOSIS — C43.4 MALIGNANT MELANOMA OF SCALP: ICD-10-CM

## 2023-09-19 DIAGNOSIS — Z79.899 IMMUNODEFICIENCY DUE TO DRUGS: ICD-10-CM

## 2023-09-19 DIAGNOSIS — I10 PRIMARY HYPERTENSION: ICD-10-CM

## 2023-09-19 DIAGNOSIS — D84.821 IMMUNODEFICIENCY DUE TO DRUGS: ICD-10-CM

## 2023-09-19 DIAGNOSIS — R21 SKIN RASH: ICD-10-CM

## 2023-09-19 LAB
ALBUMIN SERPL BCP-MCNC: 3.8 G/DL (ref 3.5–5.2)
ALP SERPL-CCNC: 92 U/L (ref 55–135)
ALT SERPL W/O P-5'-P-CCNC: 19 U/L (ref 10–44)
ANION GAP SERPL CALC-SCNC: 11 MMOL/L (ref 8–16)
AST SERPL-CCNC: 22 U/L (ref 10–40)
BASOPHILS # BLD AUTO: 0.06 K/UL (ref 0–0.2)
BASOPHILS NFR BLD: 1.3 % (ref 0–1.9)
BILIRUB SERPL-MCNC: 0.7 MG/DL (ref 0.1–1)
BUN SERPL-MCNC: 17 MG/DL (ref 8–23)
CALCIUM SERPL-MCNC: 9.2 MG/DL (ref 8.7–10.5)
CHLORIDE SERPL-SCNC: 104 MMOL/L (ref 95–110)
CO2 SERPL-SCNC: 25 MMOL/L (ref 23–29)
CREAT SERPL-MCNC: 1.4 MG/DL (ref 0.5–1.4)
DIFFERENTIAL METHOD: ABNORMAL
EOSINOPHIL # BLD AUTO: 0.3 K/UL (ref 0–0.5)
EOSINOPHIL NFR BLD: 5.9 % (ref 0–8)
ERYTHROCYTE [DISTWIDTH] IN BLOOD BY AUTOMATED COUNT: 14.6 % (ref 11.5–14.5)
EST. GFR  (NO RACE VARIABLE): 50.2 ML/MIN/1.73 M^2
GLUCOSE SERPL-MCNC: 102 MG/DL (ref 70–110)
HCT VFR BLD AUTO: 43.5 % (ref 40–54)
HGB BLD-MCNC: 14.4 G/DL (ref 14–18)
IMM GRANULOCYTES # BLD AUTO: 0.02 K/UL (ref 0–0.04)
IMM GRANULOCYTES NFR BLD AUTO: 0.4 % (ref 0–0.5)
LYMPHOCYTES # BLD AUTO: 0.9 K/UL (ref 1–4.8)
LYMPHOCYTES NFR BLD: 18.6 % (ref 18–48)
MCH RBC QN AUTO: 33.6 PG (ref 27–31)
MCHC RBC AUTO-ENTMCNC: 33.1 G/DL (ref 32–36)
MCV RBC AUTO: 102 FL (ref 82–98)
MONOCYTES # BLD AUTO: 0.5 K/UL (ref 0.3–1)
MONOCYTES NFR BLD: 9.9 % (ref 4–15)
NEUTROPHILS # BLD AUTO: 2.9 K/UL (ref 1.8–7.7)
NEUTROPHILS NFR BLD: 63.9 % (ref 38–73)
NRBC BLD-RTO: 0 /100 WBC
PLATELET # BLD AUTO: 197 K/UL (ref 150–450)
PMV BLD AUTO: 11 FL (ref 9.2–12.9)
POTASSIUM SERPL-SCNC: 4 MMOL/L (ref 3.5–5.1)
PROT SERPL-MCNC: 6.3 G/DL (ref 6–8.4)
RBC # BLD AUTO: 4.28 M/UL (ref 4.6–6.2)
SODIUM SERPL-SCNC: 140 MMOL/L (ref 136–145)
TSH SERPL DL<=0.005 MIU/L-ACNC: 2.75 UIU/ML (ref 0.4–4)
WBC # BLD AUTO: 4.56 K/UL (ref 3.9–12.7)

## 2023-09-19 PROCEDURE — 3288F PR FALLS RISK ASSESSMENT DOCUMENTED: ICD-10-PCS | Mod: CPTII,S$GLB,,

## 2023-09-19 PROCEDURE — 1101F PT FALLS ASSESS-DOCD LE1/YR: CPT | Mod: CPTII,S$GLB,,

## 2023-09-19 PROCEDURE — 3074F SYST BP LT 130 MM HG: CPT | Mod: CPTII,S$GLB,,

## 2023-09-19 PROCEDURE — 1159F MED LIST DOCD IN RCRD: CPT | Mod: CPTII,S$GLB,,

## 2023-09-19 PROCEDURE — 99999 PR PBB SHADOW E&M-EST. PATIENT-LVL IV: CPT | Mod: PBBFAC,,,

## 2023-09-19 PROCEDURE — 1159F PR MEDICATION LIST DOCUMENTED IN MEDICAL RECORD: ICD-10-PCS | Mod: CPTII,S$GLB,,

## 2023-09-19 PROCEDURE — 1126F AMNT PAIN NOTED NONE PRSNT: CPT | Mod: CPTII,S$GLB,,

## 2023-09-19 PROCEDURE — 85025 COMPLETE CBC W/AUTO DIFF WBC: CPT | Mod: PN | Performed by: INTERNAL MEDICINE

## 2023-09-19 PROCEDURE — 99213 PR OFFICE/OUTPT VISIT, EST, LEVL III, 20-29 MIN: ICD-10-PCS | Mod: S$GLB,,,

## 2023-09-19 PROCEDURE — 3078F PR MOST RECENT DIASTOLIC BLOOD PRESSURE < 80 MM HG: ICD-10-PCS | Mod: CPTII,S$GLB,,

## 2023-09-19 PROCEDURE — 3288F FALL RISK ASSESSMENT DOCD: CPT | Mod: CPTII,S$GLB,,

## 2023-09-19 PROCEDURE — 84443 ASSAY THYROID STIM HORMONE: CPT | Performed by: INTERNAL MEDICINE

## 2023-09-19 PROCEDURE — 36415 COLL VENOUS BLD VENIPUNCTURE: CPT | Mod: PN | Performed by: INTERNAL MEDICINE

## 2023-09-19 PROCEDURE — 1126F PR PAIN SEVERITY QUANTIFIED, NO PAIN PRESENT: ICD-10-PCS | Mod: CPTII,S$GLB,,

## 2023-09-19 PROCEDURE — 99213 OFFICE O/P EST LOW 20 MIN: CPT | Mod: S$GLB,,,

## 2023-09-19 PROCEDURE — 80053 COMPREHEN METABOLIC PANEL: CPT | Mod: PN | Performed by: INTERNAL MEDICINE

## 2023-09-19 PROCEDURE — 99999 PR PBB SHADOW E&M-EST. PATIENT-LVL IV: ICD-10-PCS | Mod: PBBFAC,,,

## 2023-09-19 PROCEDURE — 3078F DIAST BP <80 MM HG: CPT | Mod: CPTII,S$GLB,,

## 2023-09-19 PROCEDURE — 3074F PR MOST RECENT SYSTOLIC BLOOD PRESSURE < 130 MM HG: ICD-10-PCS | Mod: CPTII,S$GLB,,

## 2023-09-19 PROCEDURE — 1101F PR PT FALLS ASSESS DOC 0-1 FALLS W/OUT INJ PAST YR: ICD-10-PCS | Mod: CPTII,S$GLB,,

## 2023-09-19 RX ORDER — TRIAMCINOLONE ACETONIDE 5 MG/G
CREAM TOPICAL 2 TIMES DAILY
Qty: 1 EACH | Refills: 0 | Status: SHIPPED | OUTPATIENT
Start: 2023-09-19 | End: 2024-01-26 | Stop reason: SDUPTHER

## 2023-09-28 ENCOUNTER — INFUSION (OUTPATIENT)
Dept: INFUSION THERAPY | Facility: HOSPITAL | Age: 83
End: 2023-09-28
Attending: INTERNAL MEDICINE
Payer: COMMERCIAL

## 2023-09-28 ENCOUNTER — OFFICE VISIT (OUTPATIENT)
Dept: HEMATOLOGY/ONCOLOGY | Facility: CLINIC | Age: 83
End: 2023-09-28
Payer: COMMERCIAL

## 2023-09-28 ENCOUNTER — LAB VISIT (OUTPATIENT)
Dept: LAB | Facility: HOSPITAL | Age: 83
End: 2023-09-28
Attending: INTERNAL MEDICINE
Payer: COMMERCIAL

## 2023-09-28 VITALS
WEIGHT: 198.88 LBS | HEIGHT: 73 IN | TEMPERATURE: 97 F | DIASTOLIC BLOOD PRESSURE: 60 MMHG | SYSTOLIC BLOOD PRESSURE: 108 MMHG | OXYGEN SATURATION: 98 % | HEART RATE: 58 BPM | BODY MASS INDEX: 26.36 KG/M2

## 2023-09-28 VITALS
HEART RATE: 51 BPM | HEIGHT: 73 IN | TEMPERATURE: 97 F | OXYGEN SATURATION: 98 % | WEIGHT: 198.88 LBS | BODY MASS INDEX: 26.36 KG/M2 | SYSTOLIC BLOOD PRESSURE: 130 MMHG | DIASTOLIC BLOOD PRESSURE: 60 MMHG | RESPIRATION RATE: 16 BRPM

## 2023-09-28 DIAGNOSIS — C43.9 SKIN CANCER (MELANOMA): ICD-10-CM

## 2023-09-28 DIAGNOSIS — D84.821 IMMUNODEFICIENCY DUE TO DRUGS: ICD-10-CM

## 2023-09-28 DIAGNOSIS — C43.4 MALIGNANT MELANOMA OF SCALP: ICD-10-CM

## 2023-09-28 DIAGNOSIS — Z79.899 IMMUNODEFICIENCY DUE TO DRUGS: ICD-10-CM

## 2023-09-28 DIAGNOSIS — C43.4 MALIGNANT MELANOMA OF SCALP: Primary | ICD-10-CM

## 2023-09-28 DIAGNOSIS — R21 RASH: ICD-10-CM

## 2023-09-28 DIAGNOSIS — I48.0 PAROXYSMAL ATRIAL FIBRILLATION: ICD-10-CM

## 2023-09-28 DIAGNOSIS — R22.9 SUBCUTANEOUS NODULE: ICD-10-CM

## 2023-09-28 DIAGNOSIS — I10 PRIMARY HYPERTENSION: ICD-10-CM

## 2023-09-28 LAB
ALBUMIN SERPL BCP-MCNC: 3.8 G/DL (ref 3.5–5.2)
ALP SERPL-CCNC: 99 U/L (ref 55–135)
ALT SERPL W/O P-5'-P-CCNC: 15 U/L (ref 10–44)
ANION GAP SERPL CALC-SCNC: 9 MMOL/L (ref 8–16)
AST SERPL-CCNC: 20 U/L (ref 10–40)
BASOPHILS # BLD AUTO: 0.04 K/UL (ref 0–0.2)
BASOPHILS NFR BLD: 1 % (ref 0–1.9)
BILIRUB SERPL-MCNC: 0.9 MG/DL (ref 0.1–1)
BUN SERPL-MCNC: 21 MG/DL (ref 8–23)
CALCIUM SERPL-MCNC: 9.1 MG/DL (ref 8.7–10.5)
CHLORIDE SERPL-SCNC: 104 MMOL/L (ref 95–110)
CO2 SERPL-SCNC: 26 MMOL/L (ref 23–29)
CREAT SERPL-MCNC: 1.4 MG/DL (ref 0.5–1.4)
DIFFERENTIAL METHOD: ABNORMAL
EOSINOPHIL # BLD AUTO: 0.3 K/UL (ref 0–0.5)
EOSINOPHIL NFR BLD: 8.2 % (ref 0–8)
ERYTHROCYTE [DISTWIDTH] IN BLOOD BY AUTOMATED COUNT: 14.4 % (ref 11.5–14.5)
EST. GFR  (NO RACE VARIABLE): 50.2 ML/MIN/1.73 M^2
GLUCOSE SERPL-MCNC: 96 MG/DL (ref 70–110)
HCT VFR BLD AUTO: 41.7 % (ref 40–54)
HGB BLD-MCNC: 14 G/DL (ref 14–18)
IMM GRANULOCYTES # BLD AUTO: 0.02 K/UL (ref 0–0.04)
IMM GRANULOCYTES NFR BLD AUTO: 0.5 % (ref 0–0.5)
LYMPHOCYTES # BLD AUTO: 1 K/UL (ref 1–4.8)
LYMPHOCYTES NFR BLD: 25 % (ref 18–48)
MCH RBC QN AUTO: 34.1 PG (ref 27–31)
MCHC RBC AUTO-ENTMCNC: 33.6 G/DL (ref 32–36)
MCV RBC AUTO: 102 FL (ref 82–98)
MONOCYTES # BLD AUTO: 0.5 K/UL (ref 0.3–1)
MONOCYTES NFR BLD: 11.7 % (ref 4–15)
NEUTROPHILS # BLD AUTO: 2.1 K/UL (ref 1.8–7.7)
NEUTROPHILS NFR BLD: 53.6 % (ref 38–73)
NRBC BLD-RTO: 0 /100 WBC
PLATELET # BLD AUTO: 191 K/UL (ref 150–450)
PMV BLD AUTO: 10.8 FL (ref 9.2–12.9)
POTASSIUM SERPL-SCNC: 4.1 MMOL/L (ref 3.5–5.1)
PROT SERPL-MCNC: 6.3 G/DL (ref 6–8.4)
RBC # BLD AUTO: 4.11 M/UL (ref 4.6–6.2)
SODIUM SERPL-SCNC: 139 MMOL/L (ref 136–145)
WBC # BLD AUTO: 3.92 K/UL (ref 3.9–12.7)

## 2023-09-28 PROCEDURE — A4216 STERILE WATER/SALINE, 10 ML: HCPCS | Mod: PN | Performed by: INTERNAL MEDICINE

## 2023-09-28 PROCEDURE — 1126F PR PAIN SEVERITY QUANTIFIED, NO PAIN PRESENT: ICD-10-PCS | Mod: CPTII,S$GLB,, | Performed by: INTERNAL MEDICINE

## 2023-09-28 PROCEDURE — 3288F FALL RISK ASSESSMENT DOCD: CPT | Mod: CPTII,S$GLB,, | Performed by: INTERNAL MEDICINE

## 2023-09-28 PROCEDURE — 3078F DIAST BP <80 MM HG: CPT | Mod: CPTII,S$GLB,, | Performed by: INTERNAL MEDICINE

## 2023-09-28 PROCEDURE — 99215 OFFICE O/P EST HI 40 MIN: CPT | Mod: S$GLB,,, | Performed by: INTERNAL MEDICINE

## 2023-09-28 PROCEDURE — 63600175 PHARM REV CODE 636 W HCPCS: Mod: JZ,JG,PN | Performed by: INTERNAL MEDICINE

## 2023-09-28 PROCEDURE — 25000003 PHARM REV CODE 250: Mod: PN | Performed by: INTERNAL MEDICINE

## 2023-09-28 PROCEDURE — 3074F PR MOST RECENT SYSTOLIC BLOOD PRESSURE < 130 MM HG: ICD-10-PCS | Mod: CPTII,S$GLB,, | Performed by: INTERNAL MEDICINE

## 2023-09-28 PROCEDURE — 3078F PR MOST RECENT DIASTOLIC BLOOD PRESSURE < 80 MM HG: ICD-10-PCS | Mod: CPTII,S$GLB,, | Performed by: INTERNAL MEDICINE

## 2023-09-28 PROCEDURE — 99999 PR PBB SHADOW E&M-EST. PATIENT-LVL IV: ICD-10-PCS | Mod: PBBFAC,,, | Performed by: INTERNAL MEDICINE

## 2023-09-28 PROCEDURE — 36415 COLL VENOUS BLD VENIPUNCTURE: CPT | Mod: PN

## 2023-09-28 PROCEDURE — 80053 COMPREHEN METABOLIC PANEL: CPT | Mod: PN

## 2023-09-28 PROCEDURE — 3074F SYST BP LT 130 MM HG: CPT | Mod: CPTII,S$GLB,, | Performed by: INTERNAL MEDICINE

## 2023-09-28 PROCEDURE — 1126F AMNT PAIN NOTED NONE PRSNT: CPT | Mod: CPTII,S$GLB,, | Performed by: INTERNAL MEDICINE

## 2023-09-28 PROCEDURE — 1101F PR PT FALLS ASSESS DOC 0-1 FALLS W/OUT INJ PAST YR: ICD-10-PCS | Mod: CPTII,S$GLB,, | Performed by: INTERNAL MEDICINE

## 2023-09-28 PROCEDURE — 85025 COMPLETE CBC W/AUTO DIFF WBC: CPT | Mod: PN

## 2023-09-28 PROCEDURE — 3288F PR FALLS RISK ASSESSMENT DOCUMENTED: ICD-10-PCS | Mod: CPTII,S$GLB,, | Performed by: INTERNAL MEDICINE

## 2023-09-28 PROCEDURE — 1101F PT FALLS ASSESS-DOCD LE1/YR: CPT | Mod: CPTII,S$GLB,, | Performed by: INTERNAL MEDICINE

## 2023-09-28 PROCEDURE — 99999 PR PBB SHADOW E&M-EST. PATIENT-LVL IV: CPT | Mod: PBBFAC,,, | Performed by: INTERNAL MEDICINE

## 2023-09-28 PROCEDURE — 96413 CHEMO IV INFUSION 1 HR: CPT | Mod: PN

## 2023-09-28 PROCEDURE — 99215 PR OFFICE/OUTPT VISIT, EST, LEVL V, 40-54 MIN: ICD-10-PCS | Mod: S$GLB,,, | Performed by: INTERNAL MEDICINE

## 2023-09-28 RX ORDER — SODIUM CHLORIDE 0.9 % (FLUSH) 0.9 %
10 SYRINGE (ML) INJECTION
Status: CANCELLED | OUTPATIENT
Start: 2023-09-28

## 2023-09-28 RX ORDER — HEPARIN 100 UNIT/ML
500 SYRINGE INTRAVENOUS
Status: CANCELLED | OUTPATIENT
Start: 2023-09-28

## 2023-09-28 RX ORDER — SODIUM CHLORIDE 0.9 % (FLUSH) 0.9 %
10 SYRINGE (ML) INJECTION
Status: DISCONTINUED | OUTPATIENT
Start: 2023-09-28 | End: 2023-09-28 | Stop reason: HOSPADM

## 2023-09-28 RX ADMIN — Medication 10 ML: at 11:09

## 2023-09-28 RX ADMIN — SODIUM CHLORIDE: 9 INJECTION, SOLUTION INTRAVENOUS at 11:09

## 2023-09-28 RX ADMIN — SODIUM CHLORIDE 480 MG: 9 INJECTION, SOLUTION INTRAVENOUS at 12:09

## 2023-09-28 NOTE — PLAN OF CARE
Problem: Adult Inpatient Plan of Care  Goal: Patient-Specific Goal (Individualized)  Outcome: Ongoing, Progressing  Flowsheets (Taken 9/28/2023 1147)  Anxieties, Fears or Concerns: none expressed at this time  Individualized Care Needs: recliner, pillow x2, spouse at chairside     Problem: Fatigue  Goal: Improved Activity Tolerance  Intervention: Promote Improved Energy  Flowsheets (Taken 9/28/2023 1147)  Fatigue Management: fatigue-related activity identified  Sleep/Rest Enhancement:   natural light exposure provided   noise level reduced  Activity Management:   Ambulated -L4   Ambulated in almanza - L4
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
## 2023-10-23 ENCOUNTER — PATIENT MESSAGE (OUTPATIENT)
Dept: HEMATOLOGY/ONCOLOGY | Facility: CLINIC | Age: 83
End: 2023-10-23
Payer: COMMERCIAL

## 2023-10-25 ENCOUNTER — HOSPITAL ENCOUNTER (OUTPATIENT)
Dept: RADIOLOGY | Facility: HOSPITAL | Age: 83
Discharge: HOME | End: 2023-10-25
Attending: INTERNAL MEDICINE
Payer: COMMERCIAL

## 2023-10-25 DIAGNOSIS — C43.4 MALIGNANT MELANOMA OF SCALP: ICD-10-CM

## 2023-10-25 LAB — GLUCOSE SERPL-MCNC: 88 MG/DL (ref 70–110)

## 2023-10-25 PROCEDURE — 78816 NM PET CT WHOLE BODY: ICD-10-PCS | Mod: 26,PS,, | Performed by: RADIOLOGY

## 2023-10-25 PROCEDURE — 78816 PET IMAGE W/CT FULL BODY: CPT | Mod: TC,PN

## 2023-10-25 PROCEDURE — 78816 PET IMAGE W/CT FULL BODY: CPT | Mod: 26,PS,, | Performed by: RADIOLOGY
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
## 2023-10-26 ENCOUNTER — DOCUMENTATION ONLY (OUTPATIENT)
Dept: INFUSION THERAPY | Facility: HOSPITAL | Age: 83
End: 2023-10-26

## 2023-10-26 ENCOUNTER — OFFICE VISIT (OUTPATIENT)
Dept: HEMATOLOGY/ONCOLOGY | Facility: CLINIC | Age: 83
End: 2023-10-26
Payer: COMMERCIAL

## 2023-10-26 ENCOUNTER — DOCUMENTATION ONLY (OUTPATIENT)
Dept: INFUSION THERAPY | Facility: HOSPITAL | Age: 83
End: 2023-10-26
Payer: COMMERCIAL

## 2023-10-26 ENCOUNTER — INFUSION (OUTPATIENT)
Dept: INFUSION THERAPY | Facility: HOSPITAL | Age: 83
End: 2023-10-26
Attending: INTERNAL MEDICINE
Payer: COMMERCIAL

## 2023-10-26 VITALS
SYSTOLIC BLOOD PRESSURE: 147 MMHG | HEIGHT: 73 IN | TEMPERATURE: 97 F | BODY MASS INDEX: 26.65 KG/M2 | WEIGHT: 201.06 LBS | HEART RATE: 54 BPM | OXYGEN SATURATION: 100 % | RESPIRATION RATE: 20 BRPM | DIASTOLIC BLOOD PRESSURE: 70 MMHG

## 2023-10-26 VITALS
OXYGEN SATURATION: 100 % | TEMPERATURE: 97 F | WEIGHT: 201.06 LBS | HEIGHT: 73 IN | BODY MASS INDEX: 26.65 KG/M2 | DIASTOLIC BLOOD PRESSURE: 60 MMHG | RESPIRATION RATE: 20 BRPM | HEART RATE: 70 BPM | SYSTOLIC BLOOD PRESSURE: 122 MMHG

## 2023-10-26 DIAGNOSIS — I48.0 PAROXYSMAL ATRIAL FIBRILLATION: ICD-10-CM

## 2023-10-26 DIAGNOSIS — R22.9 SUBCUTANEOUS NODULE: ICD-10-CM

## 2023-10-26 DIAGNOSIS — R21 RASH: ICD-10-CM

## 2023-10-26 DIAGNOSIS — C43.9 SKIN CANCER (MELANOMA): ICD-10-CM

## 2023-10-26 DIAGNOSIS — D84.821 IMMUNODEFICIENCY DUE TO DRUGS: ICD-10-CM

## 2023-10-26 DIAGNOSIS — I10 PRIMARY HYPERTENSION: ICD-10-CM

## 2023-10-26 DIAGNOSIS — Z79.899 IMMUNODEFICIENCY DUE TO DRUGS: ICD-10-CM

## 2023-10-26 DIAGNOSIS — D53.9 MACROCYTIC ANEMIA: ICD-10-CM

## 2023-10-26 DIAGNOSIS — C43.4 MALIGNANT MELANOMA OF SCALP: Primary | ICD-10-CM

## 2023-10-26 PROCEDURE — 99999 PR PBB SHADOW E&M-EST. PATIENT-LVL IV: CPT | Mod: PBBFAC,,, | Performed by: INTERNAL MEDICINE

## 2023-10-26 PROCEDURE — 3074F PR MOST RECENT SYSTOLIC BLOOD PRESSURE < 130 MM HG: ICD-10-PCS | Mod: CPTII,S$GLB,, | Performed by: INTERNAL MEDICINE

## 2023-10-26 PROCEDURE — 1126F AMNT PAIN NOTED NONE PRSNT: CPT | Mod: CPTII,S$GLB,, | Performed by: INTERNAL MEDICINE

## 2023-10-26 PROCEDURE — 99999 PR PBB SHADOW E&M-EST. PATIENT-LVL IV: ICD-10-PCS | Mod: PBBFAC,,, | Performed by: INTERNAL MEDICINE

## 2023-10-26 PROCEDURE — 1159F PR MEDICATION LIST DOCUMENTED IN MEDICAL RECORD: ICD-10-PCS | Mod: CPTII,S$GLB,, | Performed by: INTERNAL MEDICINE

## 2023-10-26 PROCEDURE — 3078F DIAST BP <80 MM HG: CPT | Mod: CPTII,S$GLB,, | Performed by: INTERNAL MEDICINE

## 2023-10-26 PROCEDURE — 1101F PT FALLS ASSESS-DOCD LE1/YR: CPT | Mod: CPTII,S$GLB,, | Performed by: INTERNAL MEDICINE

## 2023-10-26 PROCEDURE — 3288F PR FALLS RISK ASSESSMENT DOCUMENTED: ICD-10-PCS | Mod: CPTII,S$GLB,, | Performed by: INTERNAL MEDICINE

## 2023-10-26 PROCEDURE — A4216 STERILE WATER/SALINE, 10 ML: HCPCS | Mod: PN | Performed by: INTERNAL MEDICINE

## 2023-10-26 PROCEDURE — 25000003 PHARM REV CODE 250: Mod: PN | Performed by: INTERNAL MEDICINE

## 2023-10-26 PROCEDURE — 1126F PR PAIN SEVERITY QUANTIFIED, NO PAIN PRESENT: ICD-10-PCS | Mod: CPTII,S$GLB,, | Performed by: INTERNAL MEDICINE

## 2023-10-26 PROCEDURE — 3078F PR MOST RECENT DIASTOLIC BLOOD PRESSURE < 80 MM HG: ICD-10-PCS | Mod: CPTII,S$GLB,, | Performed by: INTERNAL MEDICINE

## 2023-10-26 PROCEDURE — 3074F SYST BP LT 130 MM HG: CPT | Mod: CPTII,S$GLB,, | Performed by: INTERNAL MEDICINE

## 2023-10-26 PROCEDURE — 96413 CHEMO IV INFUSION 1 HR: CPT | Mod: PN

## 2023-10-26 PROCEDURE — 99215 OFFICE O/P EST HI 40 MIN: CPT | Mod: S$GLB,,, | Performed by: INTERNAL MEDICINE

## 2023-10-26 PROCEDURE — 1159F MED LIST DOCD IN RCRD: CPT | Mod: CPTII,S$GLB,, | Performed by: INTERNAL MEDICINE

## 2023-10-26 PROCEDURE — 3288F FALL RISK ASSESSMENT DOCD: CPT | Mod: CPTII,S$GLB,, | Performed by: INTERNAL MEDICINE

## 2023-10-26 PROCEDURE — 63600175 PHARM REV CODE 636 W HCPCS: Mod: JZ,JG,PN | Performed by: INTERNAL MEDICINE

## 2023-10-26 PROCEDURE — 99215 PR OFFICE/OUTPT VISIT, EST, LEVL V, 40-54 MIN: ICD-10-PCS | Mod: S$GLB,,, | Performed by: INTERNAL MEDICINE

## 2023-10-26 PROCEDURE — 1101F PR PT FALLS ASSESS DOC 0-1 FALLS W/OUT INJ PAST YR: ICD-10-PCS | Mod: CPTII,S$GLB,, | Performed by: INTERNAL MEDICINE

## 2023-10-26 RX ORDER — SODIUM CHLORIDE 0.9 % (FLUSH) 0.9 %
10 SYRINGE (ML) INJECTION
Status: DISCONTINUED | OUTPATIENT
Start: 2023-10-26 | End: 2023-10-26 | Stop reason: HOSPADM

## 2023-10-26 RX ORDER — SODIUM CHLORIDE 0.9 % (FLUSH) 0.9 %
10 SYRINGE (ML) INJECTION
Status: CANCELLED | OUTPATIENT
Start: 2023-10-26

## 2023-10-26 RX ORDER — HEPARIN 100 UNIT/ML
500 SYRINGE INTRAVENOUS
Status: CANCELLED | OUTPATIENT
Start: 2023-10-26

## 2023-10-26 RX ADMIN — Medication 10 ML: at 11:10

## 2023-10-26 RX ADMIN — SODIUM CHLORIDE: 9 INJECTION, SOLUTION INTRAVENOUS at 11:10

## 2023-10-26 RX ADMIN — SODIUM CHLORIDE 480 MG: 9 INJECTION, SOLUTION INTRAVENOUS at 11:10

## 2023-10-26 NOTE — PROGRESS NOTES
Oncology Nutrition       Weight Check   Chart reviewed. Weight check completed on pt.     CBW:201#  Weight at initiation of tx:210# (11/16/22)    Wt Readings from Last 10 Encounters:   10/26/23 91.2 kg (201 lb 1 oz)   10/26/23 91.2 kg (201 lb 1 oz)   09/28/23 90.2 kg (198 lb 13.7 oz)   09/28/23 90.2 kg (198 lb 13.7 oz)   09/19/23 88.4 kg (194 lb 14.2 oz)   08/21/23 89.4 kg (197 lb 1.5 oz)   08/21/23 89.4 kg (197 lb 1.5 oz)   07/24/23 90.4 kg (199 lb 4.7 oz)   07/21/23 90 kg (198 lb 6.6 oz)   06/29/23 91.5 kg (201 lb 11.5 oz)          RD plan of care: Weight is currently 201#. No significant change at this time. Per nursing nutrition risk report, pt denies any nutritional concerns or challenges at this time. RD to continue to monitor prn; no nutritional interventions are needed at this time.     Lillie Jordan, MARKN, LDN  10/26/2023  11:05 AM

## 2023-10-26 NOTE — PROGRESS NOTES
SW met with pt and wife at chairside. Pt in good spirits. Pt uses humor to cope. SW provided a gas card. Pt thankful and denied any further needs from SW at this time.    Monica Meeks, BAKARIW

## 2023-10-26 NOTE — PLAN OF CARE
Problem: Adult Inpatient Plan of Care  Goal: Plan of Care Review  Outcome: Ongoing, Progressing  Flowsheets (Taken 10/26/2023 1125)  Plan of Care Reviewed With:   patient   spouse  Goal: Patient-Specific Goal (Individualized)  Outcome: Ongoing, Progressing  Flowsheets (Taken 10/26/2023 1125)  Anxieties, Fears or Concerns: none  Individualized Care Needs: recliner, pillow x 2, conversation, spouse at chairside     Problem: Fatigue  Goal: Improved Activity Tolerance  Outcome: Ongoing, Progressing  Intervention: Promote Improved Energy  Flowsheets (Taken 10/26/2023 1125)  Fatigue Management:   activity schedule adjusted   frequent rest breaks encouraged   activity assistance provided   paced activity encouraged   fatigue-related activity identified  Sleep/Rest Enhancement:   natural light exposure provided   noise level reduced   reading promoted   regular sleep/rest pattern promoted   family presence promoted   room darkened  Activity Management:   Ambulated -L4   Ambulated to bathroom - L4   Ambulated in almanza - L4   Up in stretcher chair - L1   Pt tolerated Opdivo well today. NAD/no concerns voiced. Reviewed follow-up appointments. All questions were answered, ambulated independently at d/c with spouse.

## 2023-11-21 ENCOUNTER — OFFICE VISIT (OUTPATIENT)
Dept: HEMATOLOGY/ONCOLOGY | Facility: CLINIC | Age: 83
End: 2023-11-21
Payer: COMMERCIAL

## 2023-11-21 ENCOUNTER — LAB VISIT (OUTPATIENT)
Dept: LAB | Facility: HOSPITAL | Age: 83
End: 2023-11-21
Attending: INTERNAL MEDICINE
Payer: COMMERCIAL

## 2023-11-21 VITALS
WEIGHT: 204.56 LBS | DIASTOLIC BLOOD PRESSURE: 74 MMHG | HEIGHT: 73 IN | OXYGEN SATURATION: 99 % | SYSTOLIC BLOOD PRESSURE: 190 MMHG | TEMPERATURE: 97 F | HEART RATE: 64 BPM | BODY MASS INDEX: 27.11 KG/M2 | RESPIRATION RATE: 18 BRPM

## 2023-11-21 DIAGNOSIS — C43.4 MALIGNANT MELANOMA OF SCALP: ICD-10-CM

## 2023-11-21 DIAGNOSIS — L29.9 ITCHING: ICD-10-CM

## 2023-11-21 DIAGNOSIS — C43.4 MALIGNANT MELANOMA OF SCALP: Primary | ICD-10-CM

## 2023-11-21 PROBLEM — R21 SKIN RASH: Status: RESOLVED | Noted: 2023-04-06 | Resolved: 2023-11-21

## 2023-11-21 LAB
ALBUMIN SERPL BCP-MCNC: 3.9 G/DL (ref 3.5–5.2)
ALP SERPL-CCNC: 102 U/L (ref 55–135)
ALT SERPL W/O P-5'-P-CCNC: 19 U/L (ref 10–44)
ANION GAP SERPL CALC-SCNC: 7 MMOL/L (ref 8–16)
AST SERPL-CCNC: 23 U/L (ref 10–40)
BASOPHILS # BLD AUTO: 0.06 K/UL (ref 0–0.2)
BASOPHILS NFR BLD: 1.4 % (ref 0–1.9)
BILIRUB SERPL-MCNC: 0.9 MG/DL (ref 0.1–1)
BUN SERPL-MCNC: 14 MG/DL (ref 8–23)
CALCIUM SERPL-MCNC: 9 MG/DL (ref 8.7–10.5)
CHLORIDE SERPL-SCNC: 104 MMOL/L (ref 95–110)
CO2 SERPL-SCNC: 27 MMOL/L (ref 23–29)
CREAT SERPL-MCNC: 1.3 MG/DL (ref 0.5–1.4)
DIFFERENTIAL METHOD: ABNORMAL
EOSINOPHIL # BLD AUTO: 0.3 K/UL (ref 0–0.5)
EOSINOPHIL NFR BLD: 7 % (ref 0–8)
ERYTHROCYTE [DISTWIDTH] IN BLOOD BY AUTOMATED COUNT: 14 % (ref 11.5–14.5)
EST. GFR  (NO RACE VARIABLE): 54.8 ML/MIN/1.73 M^2
GLUCOSE SERPL-MCNC: 106 MG/DL (ref 70–110)
HCT VFR BLD AUTO: 41.6 % (ref 40–54)
HGB BLD-MCNC: 14 G/DL (ref 14–18)
IMM GRANULOCYTES # BLD AUTO: 0.02 K/UL (ref 0–0.04)
IMM GRANULOCYTES NFR BLD AUTO: 0.5 % (ref 0–0.5)
LYMPHOCYTES # BLD AUTO: 1.1 K/UL (ref 1–4.8)
LYMPHOCYTES NFR BLD: 25.4 % (ref 18–48)
MCH RBC QN AUTO: 34.1 PG (ref 27–31)
MCHC RBC AUTO-ENTMCNC: 33.7 G/DL (ref 32–36)
MCV RBC AUTO: 101 FL (ref 82–98)
MONOCYTES # BLD AUTO: 0.4 K/UL (ref 0.3–1)
MONOCYTES NFR BLD: 9.8 % (ref 4–15)
NEUTROPHILS # BLD AUTO: 2.3 K/UL (ref 1.8–7.7)
NEUTROPHILS NFR BLD: 55.9 % (ref 38–73)
NRBC BLD-RTO: 0 /100 WBC
PLATELET # BLD AUTO: 171 K/UL (ref 150–450)
PMV BLD AUTO: 11.1 FL (ref 9.2–12.9)
POTASSIUM SERPL-SCNC: 4 MMOL/L (ref 3.5–5.1)
PROT SERPL-MCNC: 6.5 G/DL (ref 6–8.4)
RBC # BLD AUTO: 4.11 M/UL (ref 4.6–6.2)
SODIUM SERPL-SCNC: 138 MMOL/L (ref 136–145)
TSH SERPL DL<=0.005 MIU/L-ACNC: 1.85 UIU/ML (ref 0.4–4)
WBC # BLD AUTO: 4.17 K/UL (ref 3.9–12.7)

## 2023-11-21 PROCEDURE — 99999 PR PBB SHADOW E&M-EST. PATIENT-LVL IV: ICD-10-PCS | Mod: PBBFAC,,, | Performed by: INTERNAL MEDICINE

## 2023-11-21 PROCEDURE — 3288F FALL RISK ASSESSMENT DOCD: CPT | Mod: CPTII,S$GLB,, | Performed by: INTERNAL MEDICINE

## 2023-11-21 PROCEDURE — 1126F PR PAIN SEVERITY QUANTIFIED, NO PAIN PRESENT: ICD-10-PCS | Mod: CPTII,S$GLB,, | Performed by: INTERNAL MEDICINE

## 2023-11-21 PROCEDURE — 1159F PR MEDICATION LIST DOCUMENTED IN MEDICAL RECORD: ICD-10-PCS | Mod: CPTII,S$GLB,, | Performed by: INTERNAL MEDICINE

## 2023-11-21 PROCEDURE — 36415 COLL VENOUS BLD VENIPUNCTURE: CPT | Mod: PN | Performed by: INTERNAL MEDICINE

## 2023-11-21 PROCEDURE — 3078F PR MOST RECENT DIASTOLIC BLOOD PRESSURE < 80 MM HG: ICD-10-PCS | Mod: CPTII,S$GLB,, | Performed by: INTERNAL MEDICINE

## 2023-11-21 PROCEDURE — 84443 ASSAY THYROID STIM HORMONE: CPT | Performed by: INTERNAL MEDICINE

## 2023-11-21 PROCEDURE — 1101F PR PT FALLS ASSESS DOC 0-1 FALLS W/OUT INJ PAST YR: ICD-10-PCS | Mod: CPTII,S$GLB,, | Performed by: INTERNAL MEDICINE

## 2023-11-21 PROCEDURE — 1159F MED LIST DOCD IN RCRD: CPT | Mod: CPTII,S$GLB,, | Performed by: INTERNAL MEDICINE

## 2023-11-21 PROCEDURE — 3288F PR FALLS RISK ASSESSMENT DOCUMENTED: ICD-10-PCS | Mod: CPTII,S$GLB,, | Performed by: INTERNAL MEDICINE

## 2023-11-21 PROCEDURE — 1126F AMNT PAIN NOTED NONE PRSNT: CPT | Mod: CPTII,S$GLB,, | Performed by: INTERNAL MEDICINE

## 2023-11-21 PROCEDURE — 1101F PT FALLS ASSESS-DOCD LE1/YR: CPT | Mod: CPTII,S$GLB,, | Performed by: INTERNAL MEDICINE

## 2023-11-21 PROCEDURE — 80053 COMPREHEN METABOLIC PANEL: CPT | Mod: PN | Performed by: INTERNAL MEDICINE

## 2023-11-21 PROCEDURE — 99215 OFFICE O/P EST HI 40 MIN: CPT | Mod: S$GLB,,, | Performed by: INTERNAL MEDICINE

## 2023-11-21 PROCEDURE — 3077F PR MOST RECENT SYSTOLIC BLOOD PRESSURE >= 140 MM HG: ICD-10-PCS | Mod: CPTII,S$GLB,, | Performed by: INTERNAL MEDICINE

## 2023-11-21 PROCEDURE — 3078F DIAST BP <80 MM HG: CPT | Mod: CPTII,S$GLB,, | Performed by: INTERNAL MEDICINE

## 2023-11-21 PROCEDURE — 99215 PR OFFICE/OUTPT VISIT, EST, LEVL V, 40-54 MIN: ICD-10-PCS | Mod: S$GLB,,, | Performed by: INTERNAL MEDICINE

## 2023-11-21 PROCEDURE — 3077F SYST BP >= 140 MM HG: CPT | Mod: CPTII,S$GLB,, | Performed by: INTERNAL MEDICINE

## 2023-11-21 PROCEDURE — 85025 COMPLETE CBC W/AUTO DIFF WBC: CPT | Mod: PN | Performed by: INTERNAL MEDICINE

## 2023-11-21 PROCEDURE — 99999 PR PBB SHADOW E&M-EST. PATIENT-LVL IV: CPT | Mod: PBBFAC,,, | Performed by: INTERNAL MEDICINE

## 2023-11-21 NOTE — PROGRESS NOTES
Subjective     Patient ID: Stevan Charles Jr. is a 83 y.o. male.    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  MRI brain 11/01/2022 showed defect in the left paramedian posterior frontal parietal scalp with extension to the outer cortical margin the calvarium without obvious intraosseous or intracranial extension.  PET-CT on 11/02/2022 showed a markedly hypermetabolic cutaneous nodule at the skull vertex along the midline consistent with the patient's known melanoma measuring 2.9 x 2.3 x 1.5 cm; hypermetabolic nodule in the anterior aspect of the right deltoid was muscle measuring 1.8 x 1.3 cm; and hypermetabolic cutaneous nodule at the right posterior elbow measuring 3 x 1.6 cm.  Right anterior chest wall lesion was biopsied 11/16/2022 positive for foreign body giant cell reaction.  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  [id number] (microsatellite with no known regional lymph node disease).  Pt was discussed at tumor board with recommendation for Neoadjuvant Ipi/Nivo for 2 cycles prior to further resection.              The patient underwent 1st cycle of Ipi/Nivo on 01/06/2023.              The patient underwent re-excision of his scalp melanoma on 2/16/23 with path showing no residual melanoma.              The patient underwent PET/CT on 3/21/23 showing a stable markedly hypermetabolic oval-shaped nodule in the anterior aspect of the right deltoid muscle measuring 1.8 x 1.2 cm.              The patient underwent a split-thickness skin graft on the scalp from skin taken from the right lateral thigh on 03/30/2023.  The patient then presented to see Dr. Liu on 04/06/2023 after developing a rash of the right thigh in the area where  "DuraPrep had been used.  The patient was then treated with triamcinolone cream with improvement of his rash.               The patient underwent PET-CT on 07/18/2023 showing no evidence of metastatic or recurrent disease.     HPIHis PET scan on 10/25/2023 reveals "Patient status post resection of scalp hematoma with no evidence of recurrent or metastatic disease. No significant interval changes.  Unchanged asymmetric activity in prostate gland, consider correlation with PSA and prostate MRI"  He continues on Opdivo for a total of 1 year.  She notes itching on his back and arms since cycle 3 of immunotherapy, feels like it is slightly worse, denies any rash         Review of Systems   Constitutional:  Negative for appetite change, fatigue and unexpected weight change.   HENT:  Negative for mouth sores.    Eyes:  Negative for visual disturbance.   Respiratory:  Negative for cough and shortness of breath.    Cardiovascular:  Negative for chest pain.   Gastrointestinal:  Negative for abdominal pain and diarrhea.   Genitourinary:  Negative for frequency.   Musculoskeletal:  Negative for back pain.   Integumentary:  Negative for rash.   Neurological:  Negative for headaches.   Hematological:  Negative for adenopathy.   Psychiatric/Behavioral:  The patient is not nervous/anxious.    All other systems reviewed and are negative.         Objective     Physical Exam  Vitals reviewed.   Constitutional:       Appearance: He is well-developed.   HENT:      Mouth/Throat:      Pharynx: No oropharyngeal exudate.   Cardiovascular:      Rate and Rhythm: Normal rate.      Heart sounds: Normal heart sounds.   Pulmonary:      Effort: Pulmonary effort is normal.      Breath sounds: Normal breath sounds. No wheezing.   Abdominal:      General: Bowel sounds are normal.      Palpations: Abdomen is soft.      Tenderness: There is no abdominal tenderness.   Musculoskeletal:         General: No tenderness.   Lymphadenopathy:      Cervical: No " cervical adenopathy.   Skin:     General: Skin is warm and dry.      Findings: No rash.   Neurological:      Mental Status: He is alert and oriented to person, place, and time.      Coordination: Coordination normal.   Psychiatric:         Thought Content: Thought content normal.         Judgment: Judgment normal.        LABS:  WBC   Date Value Ref Range Status   11/21/2023 4.17 3.90 - 12.70 K/uL Final     Hemoglobin   Date Value Ref Range Status   11/21/2023 14.0 14.0 - 18.0 g/dL Final     Hematocrit   Date Value Ref Range Status   11/21/2023 41.6 40.0 - 54.0 % Final     Platelets   Date Value Ref Range Status   11/21/2023 171 150 - 450 K/uL Final     Gran # (ANC)   Date Value Ref Range Status   11/21/2023 2.3 1.8 - 7.7 K/uL Final     Gran %   Date Value Ref Range Status   11/21/2023 55.9 38.0 - 73.0 % Final       Chemistry        Component Value Date/Time     11/21/2023 1448    K 4.0 11/21/2023 1448     11/21/2023 1448    CO2 27 11/21/2023 1448    BUN 14 11/21/2023 1448    CREATININE 1.3 11/21/2023 1448     11/21/2023 1448        Component Value Date/Time    CALCIUM 9.0 11/21/2023 1448    ALKPHOS 102 11/21/2023 1448    AST 23 11/21/2023 1448    ALT 19 11/21/2023 1448    BILITOT 0.9 11/21/2023 1448          Assessment and Plan     1. Malignant melanoma of scalp    2. Itching        Route Chart for Scheduling    Med Onc Chart Routing      Follow up with physician . Cancel Opdivo tomorrow per patient request. In 2 weeks schedule CBCm,CMp and see Dr. Hart and for Opdivo   Follow up with AUGUSTINE    Infusion scheduling note    Injection scheduling note    Labs    Imaging    Pharmacy appointment    Other referrals            Treatment Plan Information   OP NIVOLUMAB 3MG/KG IPILIMUMAB 1MG/KG FOLLOWED BY NIVOLUMAB 480MG Q4W   Real Hart MD   Upcoming Treatment Dates - OP NIVOLUMAB 3MG/KG IPILIMUMAB 1MG/KG FOLLOWED BY NIVOLUMAB 480MG Q4W    12/14/2023       Chemotherapy       nivolumab 480 mg in sodium  chloride 0.9% 148 mL infusion       Mr. Charles Comes in today for Opdivo however requesting a break secondary to his itching.  He has no associated rash.  Hold Opdivo per patient request and he will return in 2 weeks to see what Dr. Hart prior to his next treatment  Above plan discussed with the patient and his accompanying wife and all questions were answered

## 2023-12-01 DIAGNOSIS — C43.4 MALIGNANT MELANOMA OF SCALP: Primary | ICD-10-CM

## 2023-12-05 ENCOUNTER — OFFICE VISIT (OUTPATIENT)
Dept: HEMATOLOGY/ONCOLOGY | Facility: CLINIC | Age: 83
End: 2023-12-05
Payer: COMMERCIAL

## 2023-12-05 ENCOUNTER — LAB VISIT (OUTPATIENT)
Dept: LAB | Facility: HOSPITAL | Age: 83
End: 2023-12-05
Attending: INTERNAL MEDICINE
Payer: COMMERCIAL

## 2023-12-05 VITALS
OXYGEN SATURATION: 98 % | WEIGHT: 202.63 LBS | DIASTOLIC BLOOD PRESSURE: 88 MMHG | TEMPERATURE: 97 F | HEIGHT: 73 IN | BODY MASS INDEX: 26.85 KG/M2 | HEART RATE: 54 BPM | SYSTOLIC BLOOD PRESSURE: 130 MMHG

## 2023-12-05 DIAGNOSIS — I10 PRIMARY HYPERTENSION: ICD-10-CM

## 2023-12-05 DIAGNOSIS — R22.9 SUBCUTANEOUS NODULE: ICD-10-CM

## 2023-12-05 DIAGNOSIS — L98.499 SKIN ULCER, UNSPECIFIED ULCER STAGE: ICD-10-CM

## 2023-12-05 DIAGNOSIS — I48.0 PAROXYSMAL ATRIAL FIBRILLATION: ICD-10-CM

## 2023-12-05 DIAGNOSIS — C43.4 MALIGNANT MELANOMA OF SCALP: Primary | ICD-10-CM

## 2023-12-05 DIAGNOSIS — C43.4 MALIGNANT MELANOMA OF SCALP: ICD-10-CM

## 2023-12-05 LAB
ALBUMIN SERPL BCP-MCNC: 3.9 G/DL (ref 3.5–5.2)
ALP SERPL-CCNC: 107 U/L (ref 55–135)
ALT SERPL W/O P-5'-P-CCNC: 20 U/L (ref 10–44)
ANION GAP SERPL CALC-SCNC: 8 MMOL/L (ref 8–16)
AST SERPL-CCNC: 21 U/L (ref 10–40)
BASOPHILS # BLD AUTO: 0.06 K/UL (ref 0–0.2)
BASOPHILS NFR BLD: 1.4 % (ref 0–1.9)
BILIRUB SERPL-MCNC: 0.9 MG/DL (ref 0.1–1)
BUN SERPL-MCNC: 20 MG/DL (ref 8–23)
CALCIUM SERPL-MCNC: 9.3 MG/DL (ref 8.7–10.5)
CHLORIDE SERPL-SCNC: 105 MMOL/L (ref 95–110)
CO2 SERPL-SCNC: 27 MMOL/L (ref 23–29)
CREAT SERPL-MCNC: 1.4 MG/DL (ref 0.5–1.4)
DIFFERENTIAL METHOD: ABNORMAL
EOSINOPHIL # BLD AUTO: 0.3 K/UL (ref 0–0.5)
EOSINOPHIL NFR BLD: 7.2 % (ref 0–8)
ERYTHROCYTE [DISTWIDTH] IN BLOOD BY AUTOMATED COUNT: 14 % (ref 11.5–14.5)
EST. GFR  (NO RACE VARIABLE): 50.2 ML/MIN/1.73 M^2
GLUCOSE SERPL-MCNC: 93 MG/DL (ref 70–110)
HCT VFR BLD AUTO: 42.4 % (ref 40–54)
HGB BLD-MCNC: 14.3 G/DL (ref 14–18)
IMM GRANULOCYTES # BLD AUTO: 0.01 K/UL (ref 0–0.04)
IMM GRANULOCYTES NFR BLD AUTO: 0.2 % (ref 0–0.5)
LYMPHOCYTES # BLD AUTO: 0.9 K/UL (ref 1–4.8)
LYMPHOCYTES NFR BLD: 19.5 % (ref 18–48)
MCH RBC QN AUTO: 34.2 PG (ref 27–31)
MCHC RBC AUTO-ENTMCNC: 33.7 G/DL (ref 32–36)
MCV RBC AUTO: 101 FL (ref 82–98)
MONOCYTES # BLD AUTO: 0.5 K/UL (ref 0.3–1)
MONOCYTES NFR BLD: 10.9 % (ref 4–15)
NEUTROPHILS # BLD AUTO: 2.7 K/UL (ref 1.8–7.7)
NEUTROPHILS NFR BLD: 60.8 % (ref 38–73)
NRBC BLD-RTO: 0 /100 WBC
PLATELET # BLD AUTO: 193 K/UL (ref 150–450)
PMV BLD AUTO: 10.9 FL (ref 9.2–12.9)
POTASSIUM SERPL-SCNC: 4.2 MMOL/L (ref 3.5–5.1)
PROT SERPL-MCNC: 6.3 G/DL (ref 6–8.4)
RBC # BLD AUTO: 4.18 M/UL (ref 4.6–6.2)
SODIUM SERPL-SCNC: 140 MMOL/L (ref 136–145)
WBC # BLD AUTO: 4.42 K/UL (ref 3.9–12.7)

## 2023-12-05 PROCEDURE — 85025 COMPLETE CBC W/AUTO DIFF WBC: CPT | Mod: PN | Performed by: INTERNAL MEDICINE

## 2023-12-05 PROCEDURE — 99215 OFFICE O/P EST HI 40 MIN: CPT | Mod: S$GLB,,, | Performed by: INTERNAL MEDICINE

## 2023-12-05 PROCEDURE — 3079F DIAST BP 80-89 MM HG: CPT | Mod: CPTII,S$GLB,, | Performed by: INTERNAL MEDICINE

## 2023-12-05 PROCEDURE — 1101F PT FALLS ASSESS-DOCD LE1/YR: CPT | Mod: CPTII,S$GLB,, | Performed by: INTERNAL MEDICINE

## 2023-12-05 PROCEDURE — 3075F SYST BP GE 130 - 139MM HG: CPT | Mod: CPTII,S$GLB,, | Performed by: INTERNAL MEDICINE

## 2023-12-05 PROCEDURE — 3075F PR MOST RECENT SYSTOLIC BLOOD PRESS GE 130-139MM HG: ICD-10-PCS | Mod: CPTII,S$GLB,, | Performed by: INTERNAL MEDICINE

## 2023-12-05 PROCEDURE — 1159F PR MEDICATION LIST DOCUMENTED IN MEDICAL RECORD: ICD-10-PCS | Mod: CPTII,S$GLB,, | Performed by: INTERNAL MEDICINE

## 2023-12-05 PROCEDURE — 1101F PR PT FALLS ASSESS DOC 0-1 FALLS W/OUT INJ PAST YR: ICD-10-PCS | Mod: CPTII,S$GLB,, | Performed by: INTERNAL MEDICINE

## 2023-12-05 PROCEDURE — 36415 COLL VENOUS BLD VENIPUNCTURE: CPT | Mod: PN | Performed by: INTERNAL MEDICINE

## 2023-12-05 PROCEDURE — 1126F PR PAIN SEVERITY QUANTIFIED, NO PAIN PRESENT: ICD-10-PCS | Mod: CPTII,S$GLB,, | Performed by: INTERNAL MEDICINE

## 2023-12-05 PROCEDURE — 99215 PR OFFICE/OUTPT VISIT, EST, LEVL V, 40-54 MIN: ICD-10-PCS | Mod: S$GLB,,, | Performed by: INTERNAL MEDICINE

## 2023-12-05 PROCEDURE — 80053 COMPREHEN METABOLIC PANEL: CPT | Mod: PN | Performed by: INTERNAL MEDICINE

## 2023-12-05 PROCEDURE — 99999 PR PBB SHADOW E&M-EST. PATIENT-LVL IV: ICD-10-PCS | Mod: PBBFAC,,, | Performed by: INTERNAL MEDICINE

## 2023-12-05 PROCEDURE — 3079F PR MOST RECENT DIASTOLIC BLOOD PRESSURE 80-89 MM HG: ICD-10-PCS | Mod: CPTII,S$GLB,, | Performed by: INTERNAL MEDICINE

## 2023-12-05 PROCEDURE — 1126F AMNT PAIN NOTED NONE PRSNT: CPT | Mod: CPTII,S$GLB,, | Performed by: INTERNAL MEDICINE

## 2023-12-05 PROCEDURE — 3288F PR FALLS RISK ASSESSMENT DOCUMENTED: ICD-10-PCS | Mod: CPTII,S$GLB,, | Performed by: INTERNAL MEDICINE

## 2023-12-05 PROCEDURE — 1159F MED LIST DOCD IN RCRD: CPT | Mod: CPTII,S$GLB,, | Performed by: INTERNAL MEDICINE

## 2023-12-05 PROCEDURE — 99999 PR PBB SHADOW E&M-EST. PATIENT-LVL IV: CPT | Mod: PBBFAC,,, | Performed by: INTERNAL MEDICINE

## 2023-12-05 PROCEDURE — 3288F FALL RISK ASSESSMENT DOCD: CPT | Mod: CPTII,S$GLB,, | Performed by: INTERNAL MEDICINE
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
## 2024-01-26 DIAGNOSIS — R21 SKIN RASH: ICD-10-CM

## 2024-01-26 RX ORDER — TRIAMCINOLONE ACETONIDE 5 MG/G
CREAM TOPICAL 2 TIMES DAILY
Qty: 454 G | Refills: 0 | Status: SHIPPED | OUTPATIENT
Start: 2024-01-26 | End: 2024-04-01

## 2024-01-26 NOTE — TELEPHONE ENCOUNTER
----- Message from Argelia Crowder sent at 1/26/2024 12:42 PM CST -----  Type:  RX Refill Request    Who Called:  Nohemi   Refill or New Rx:  refill  RX Name and Strength: triamcinolone acetonide 0.5% (KENALOG) 0.5 % Crea   How is the patient currently taking it? (ex. 1XDay):    Is this a 30 day or 90 day RX:    Preferred Pharmacy with phone number:  [facility] - [place], [place] - [address] [facility]  Local or Mail Order:  local  Ordering Provider:  Dr. Tanner Us Call Back Number: [phone]   Additional Information:  Nohemi is requesting a call back from the nurse regarding a refill.

## 2024-01-26 NOTE — TELEPHONE ENCOUNTER
"Called and spoke with pt and his wife. Pt's wife stated " He would like another tube of the cream he had for the rash Dr. Hart saw at his last visit. The 0.5% worked much better than the 0.1%. He still has it on his legs, back, and arms, but it is almost gone. We just need a bigger tube of what we got." I voiced understanding and confirmed I would send the rx request to Dr. Hart.   "

## 2024-02-01 ENCOUNTER — HOSPITAL ENCOUNTER (OUTPATIENT)
Dept: RADIOLOGY | Facility: HOSPITAL | Age: 84
Discharge: HOME | End: 2024-02-01
Attending: INTERNAL MEDICINE
Payer: COMMERCIAL

## 2024-02-01 DIAGNOSIS — C43.4 MALIGNANT MELANOMA OF SCALP: ICD-10-CM

## 2024-02-01 LAB — GLUCOSE SERPL-MCNC: 104 MG/DL (ref 70–110)

## 2024-02-01 PROCEDURE — 78816 PET IMAGE W/CT FULL BODY: CPT | Mod: TC,PN

## 2024-02-01 PROCEDURE — 78816 PET IMAGE W/CT FULL BODY: CPT | Mod: 26,PS,, | Performed by: RADIOLOGY
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
## 2024-02-07 ENCOUNTER — LAB VISIT (OUTPATIENT)
Dept: LAB | Facility: HOSPITAL | Age: 84
End: 2024-02-07
Attending: INTERNAL MEDICINE
Payer: COMMERCIAL

## 2024-02-07 ENCOUNTER — OFFICE VISIT (OUTPATIENT)
Dept: HEMATOLOGY/ONCOLOGY | Facility: CLINIC | Age: 84
End: 2024-02-07
Payer: COMMERCIAL

## 2024-02-07 VITALS
SYSTOLIC BLOOD PRESSURE: 152 MMHG | TEMPERATURE: 97 F | HEIGHT: 73 IN | OXYGEN SATURATION: 98 % | DIASTOLIC BLOOD PRESSURE: 80 MMHG | WEIGHT: 208.75 LBS | HEART RATE: 53 BPM | BODY MASS INDEX: 27.67 KG/M2 | RESPIRATION RATE: 14 BRPM

## 2024-02-07 DIAGNOSIS — N40.0 ENLARGED PROSTATE: ICD-10-CM

## 2024-02-07 DIAGNOSIS — C43.4 MALIGNANT MELANOMA OF SCALP: Primary | ICD-10-CM

## 2024-02-07 DIAGNOSIS — R22.9 SUBCUTANEOUS NODULE: ICD-10-CM

## 2024-02-07 PROCEDURE — 1126F AMNT PAIN NOTED NONE PRSNT: CPT | Mod: CPTII,S$GLB,, | Performed by: INTERNAL MEDICINE

## 2024-02-07 PROCEDURE — 1101F PT FALLS ASSESS-DOCD LE1/YR: CPT | Mod: CPTII,S$GLB,, | Performed by: INTERNAL MEDICINE

## 2024-02-07 PROCEDURE — 3288F FALL RISK ASSESSMENT DOCD: CPT | Mod: CPTII,S$GLB,, | Performed by: INTERNAL MEDICINE

## 2024-02-07 PROCEDURE — 3079F DIAST BP 80-89 MM HG: CPT | Mod: CPTII,S$GLB,, | Performed by: INTERNAL MEDICINE

## 2024-02-07 PROCEDURE — 1159F MED LIST DOCD IN RCRD: CPT | Mod: CPTII,S$GLB,, | Performed by: INTERNAL MEDICINE

## 2024-02-07 PROCEDURE — 99999 PR PBB SHADOW E&M-EST. PATIENT-LVL IV: CPT | Mod: PBBFAC,,, | Performed by: INTERNAL MEDICINE

## 2024-02-07 PROCEDURE — 36415 COLL VENOUS BLD VENIPUNCTURE: CPT | Mod: PN | Performed by: INTERNAL MEDICINE

## 2024-02-07 PROCEDURE — 99214 OFFICE O/P EST MOD 30 MIN: CPT | Mod: S$GLB,,, | Performed by: INTERNAL MEDICINE

## 2024-02-07 PROCEDURE — 84153 ASSAY OF PSA TOTAL: CPT | Performed by: INTERNAL MEDICINE

## 2024-02-07 PROCEDURE — 3077F SYST BP >= 140 MM HG: CPT | Mod: CPTII,S$GLB,, | Performed by: INTERNAL MEDICINE

## 2024-02-08 DIAGNOSIS — R21 RASH AND NONSPECIFIC SKIN ERUPTION: Primary | ICD-10-CM

## 2024-02-08 LAB — COMPLEXED PSA SERPL-MCNC: 5.7 NG/ML (ref 0–4)

## 2024-02-08 RX ORDER — TRIAMCINOLONE ACETONIDE 1 MG/G
CREAM TOPICAL 2 TIMES DAILY
Qty: 45 G | Refills: 2 | Status: SHIPPED | OUTPATIENT
Start: 2024-02-08

## 2024-02-08 NOTE — TELEPHONE ENCOUNTER
----- Message from Vera Martinez sent at 2/8/2024  1:43 PM CST -----  Refill   Name of Caller: Wife of patient  Best Call Back Number:  [phone]  Additional Information: wife called to ask if the medication below can be called in to the pharmacy  Prescription Name: triamcinolone 0.1 cream     Pharmacy Name:   [facility] - [facility] [address]   Phone: [phone]  Fax: [phone]

## 2024-03-23 ENCOUNTER — PATIENT MESSAGE (OUTPATIENT)
Dept: HEMATOLOGY/ONCOLOGY | Facility: CLINIC | Age: 84
End: 2024-03-23
Payer: COMMERCIAL

## 2024-03-23 DIAGNOSIS — R97.20 ELEVATED PSA: Primary | ICD-10-CM

## 2024-04-01 ENCOUNTER — OFFICE VISIT (OUTPATIENT)
Dept: UROLOGY | Facility: CLINIC | Age: 84
End: 2024-04-01
Payer: COMMERCIAL

## 2024-04-01 VITALS — BODY MASS INDEX: 27.2 KG/M2 | HEIGHT: 73 IN | WEIGHT: 205.25 LBS

## 2024-04-01 DIAGNOSIS — R97.20 ELEVATED PSA: ICD-10-CM

## 2024-04-01 LAB
BILIRUBIN, UA POC OHS: NEGATIVE
BLOOD, UA POC OHS: NEGATIVE
CLARITY, UA POC OHS: CLEAR
COLOR, UA POC OHS: YELLOW
GLUCOSE, UA POC OHS: NEGATIVE
KETONES, UA POC OHS: NEGATIVE
LEUKOCYTES, UA POC OHS: NEGATIVE
NITRITE, UA POC OHS: NEGATIVE
PH, UA POC OHS: 7
PROTEIN, UA POC OHS: NEGATIVE
SPECIFIC GRAVITY, UA POC OHS: 1.01
UROBILINOGEN, UA POC OHS: 1

## 2024-04-01 PROCEDURE — 99999 PR PBB SHADOW E&M-EST. PATIENT-LVL III: CPT | Mod: PBBFAC,,, | Performed by: UROLOGY

## 2024-04-01 PROCEDURE — 3288F FALL RISK ASSESSMENT DOCD: CPT | Mod: CPTII,S$GLB,, | Performed by: UROLOGY

## 2024-04-01 PROCEDURE — 99203 OFFICE O/P NEW LOW 30 MIN: CPT | Mod: S$GLB,,, | Performed by: UROLOGY

## 2024-04-01 PROCEDURE — 1101F PT FALLS ASSESS-DOCD LE1/YR: CPT | Mod: CPTII,S$GLB,, | Performed by: UROLOGY

## 2024-04-01 PROCEDURE — 1126F AMNT PAIN NOTED NONE PRSNT: CPT | Mod: CPTII,S$GLB,, | Performed by: UROLOGY

## 2024-04-01 PROCEDURE — 1159F MED LIST DOCD IN RCRD: CPT | Mod: CPTII,S$GLB,, | Performed by: UROLOGY

## 2024-04-01 PROCEDURE — 81003 URINALYSIS AUTO W/O SCOPE: CPT | Mod: QW,S$GLB,, | Performed by: UROLOGY

## 2024-04-01 NOTE — PROGRESS NOTES
Subjective:       Patient ID: Stevan Charles Jr. is a 84 y.o. male.    Chief Complaint: Elevated PSA    HPI  Review of Systems    Objective:      Physical Exam    Assessment:       1. Elevated PSA        Plan:       Elevated PSA  -     Ambulatory referral/consult to Urology  -     POCT Urinalysis(Instrument)
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
## 2024-05-01 ENCOUNTER — TELEPHONE (OUTPATIENT)
Dept: HEMATOLOGY/ONCOLOGY | Facility: CLINIC | Age: 84
End: 2024-05-01
Payer: COMMERCIAL

## 2024-05-01 NOTE — TELEPHONE ENCOUNTER
----- Message from Argelia Crowder sent at 5/1/2024  1:43 PM CDT -----  Type: Needs Medical Advice  Who Called:  Patient   Symptoms (please be specific):    How long has patient had these symptoms:    Pharmacy name and phone #:    Best Call Back Number: [phone]  Additional Information: Patient is requesting a call back from the nurse to have PSA added to labs on 5/2.

## 2024-05-02 ENCOUNTER — HOSPITAL ENCOUNTER (OUTPATIENT)
Dept: RADIOLOGY | Facility: HOSPITAL | Age: 84
Discharge: HOME | End: 2024-05-02
Attending: INTERNAL MEDICINE
Payer: COMMERCIAL

## 2024-05-02 DIAGNOSIS — C43.4 MALIGNANT MELANOMA OF SCALP: ICD-10-CM

## 2024-05-02 LAB — GLUCOSE SERPL-MCNC: 88 MG/DL (ref 70–110)

## 2024-05-02 PROCEDURE — 78816 PET IMAGE W/CT FULL BODY: CPT | Mod: 26,PS,, | Performed by: RADIOLOGY

## 2024-05-02 PROCEDURE — A9552 F18 FDG: HCPCS | Mod: PN | Performed by: INTERNAL MEDICINE

## 2024-05-02 PROCEDURE — 78816 PET IMAGE W/CT FULL BODY: CPT | Mod: TC,PN

## 2024-05-02 RX ORDER — FLUDEOXYGLUCOSE F18 500 MCI/ML
12.4 INJECTION INTRAVENOUS
Status: COMPLETED | OUTPATIENT
Start: 2024-05-02 | End: 2024-05-02

## 2024-05-02 RX ADMIN — FLUDEOXYGLUCOSE F-18 12.4 MILLICURIE: 500 INJECTION INTRAVENOUS at 09:05
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
## 2024-05-08 ENCOUNTER — OFFICE VISIT (OUTPATIENT)
Dept: HEMATOLOGY/ONCOLOGY | Facility: CLINIC | Age: 84
End: 2024-05-08
Payer: COMMERCIAL

## 2024-05-08 VITALS
SYSTOLIC BLOOD PRESSURE: 127 MMHG | DIASTOLIC BLOOD PRESSURE: 78 MMHG | BODY MASS INDEX: 27.03 KG/M2 | HEART RATE: 99 BPM | WEIGHT: 203.94 LBS | HEIGHT: 73 IN

## 2024-05-08 DIAGNOSIS — C43.4 MALIGNANT MELANOMA OF SCALP: Primary | ICD-10-CM

## 2024-05-08 DIAGNOSIS — N40.0 ENLARGED PROSTATE: ICD-10-CM

## 2024-05-08 DIAGNOSIS — R97.20 ELEVATED PSA: ICD-10-CM

## 2024-05-08 DIAGNOSIS — R22.9 SUBCUTANEOUS NODULE: ICD-10-CM

## 2024-05-08 PROCEDURE — 99214 OFFICE O/P EST MOD 30 MIN: CPT | Mod: S$GLB,,, | Performed by: INTERNAL MEDICINE

## 2024-05-08 PROCEDURE — 3078F DIAST BP <80 MM HG: CPT | Mod: CPTII,S$GLB,, | Performed by: INTERNAL MEDICINE

## 2024-05-08 PROCEDURE — 1126F AMNT PAIN NOTED NONE PRSNT: CPT | Mod: CPTII,S$GLB,, | Performed by: INTERNAL MEDICINE

## 2024-05-08 PROCEDURE — 99999 PR PBB SHADOW E&M-EST. PATIENT-LVL III: CPT | Mod: PBBFAC,,, | Performed by: INTERNAL MEDICINE

## 2024-05-08 PROCEDURE — 1101F PT FALLS ASSESS-DOCD LE1/YR: CPT | Mod: CPTII,S$GLB,, | Performed by: INTERNAL MEDICINE

## 2024-05-08 PROCEDURE — 3074F SYST BP LT 130 MM HG: CPT | Mod: CPTII,S$GLB,, | Performed by: INTERNAL MEDICINE

## 2024-05-08 PROCEDURE — 3288F FALL RISK ASSESSMENT DOCD: CPT | Mod: CPTII,S$GLB,, | Performed by: INTERNAL MEDICINE

## 2024-07-18 ENCOUNTER — TELEPHONE (OUTPATIENT)
Dept: HEMATOLOGY/ONCOLOGY | Facility: CLINIC | Age: 84
End: 2024-07-18
Payer: COMMERCIAL

## 2024-07-18 ENCOUNTER — PATIENT MESSAGE (OUTPATIENT)
Dept: HEMATOLOGY/ONCOLOGY | Facility: CLINIC | Age: 84
End: 2024-07-18
Payer: COMMERCIAL

## 2024-07-18 NOTE — TELEPHONE ENCOUNTER
The patient called me stating he was to have a cardioversion scheduled 07/31/2024 and did not feel that he would be up to having his PET-CT on 08/02/2024.  I instructed the patient we could schedule his PET-CT for early the following week.  The patient expressed understanding.  All questions were answered to his satisfaction.    Real Hart MD  [facility]  Hematology and Oncology  [facility]   [address]   [place] [address]   O: [phone]  F: [phone]

## 2024-08-02 ENCOUNTER — TELEPHONE (OUTPATIENT)
Dept: HEMATOLOGY/ONCOLOGY | Facility: CLINIC | Age: 84
End: 2024-08-02
Payer: COMMERCIAL

## 2024-08-02 NOTE — TELEPHONE ENCOUNTER
----- Message from Nena Maurer sent at 8/2/2024  9:36 AM CDT -----  Contact: Pt's wife  Type: Needs Medical Advice  Who Called:  Patient's wife  What is this Regarding?:He is needing to reschedule his appt on 8/9.  Best Call Back Number:  [phone]  Additional Information:  Please call the patient's wife back at the phone number listed above to advise. Thank you!

## 2024-09-09 ENCOUNTER — HOSPITAL ENCOUNTER (OUTPATIENT)
Dept: RADIOLOGY | Facility: HOSPITAL | Age: 84
Discharge: HOME | End: 2024-09-09
Attending: INTERNAL MEDICINE
Payer: COMMERCIAL

## 2024-09-09 DIAGNOSIS — C43.4 MALIGNANT MELANOMA OF SCALP: ICD-10-CM

## 2024-09-09 LAB — GLUCOSE SERPL-MCNC: 98 MG/DL (ref 70–110)

## 2024-09-09 PROCEDURE — 78816 PET IMAGE W/CT FULL BODY: CPT | Mod: TC,PN

## 2024-09-09 PROCEDURE — A9552 F18 FDG: HCPCS | Mod: PN | Performed by: INTERNAL MEDICINE

## 2024-09-09 PROCEDURE — 78816 PET IMAGE W/CT FULL BODY: CPT | Mod: 26,PS,, | Performed by: STUDENT IN AN ORGANIZED HEALTH CARE EDUCATION/TRAINING PROGRAM

## 2024-09-09 RX ORDER — FLUDEOXYGLUCOSE F18 500 MCI/ML
11.1 INJECTION INTRAVENOUS
Status: COMPLETED | OUTPATIENT
Start: 2024-09-09 | End: 2024-09-09

## 2024-09-09 RX ADMIN — FLUDEOXYGLUCOSE F-18 11.1 MILLICURIE: 500 INJECTION INTRAVENOUS at 11:09
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
## 2024-09-16 ENCOUNTER — OFFICE VISIT (OUTPATIENT)
Dept: HEMATOLOGY/ONCOLOGY | Facility: CLINIC | Age: 84
End: 2024-09-16
Payer: COMMERCIAL

## 2024-09-16 VITALS
OXYGEN SATURATION: 98 % | BODY MASS INDEX: 25.69 KG/M2 | RESPIRATION RATE: 14 BRPM | WEIGHT: 193.81 LBS | TEMPERATURE: 97 F | HEART RATE: 61 BPM | DIASTOLIC BLOOD PRESSURE: 60 MMHG | HEIGHT: 73 IN | SYSTOLIC BLOOD PRESSURE: 120 MMHG

## 2024-09-16 DIAGNOSIS — R97.20 ELEVATED PSA: ICD-10-CM

## 2024-09-16 DIAGNOSIS — C43.4 MALIGNANT MELANOMA OF SCALP: Primary | ICD-10-CM

## 2024-09-16 DIAGNOSIS — R22.9 SUBCUTANEOUS NODULE: ICD-10-CM

## 2024-09-16 DIAGNOSIS — N40.0 ENLARGED PROSTATE: ICD-10-CM

## 2024-09-16 PROCEDURE — 1159F MED LIST DOCD IN RCRD: CPT | Mod: CPTII,S$GLB,, | Performed by: INTERNAL MEDICINE

## 2024-09-16 PROCEDURE — 99214 OFFICE O/P EST MOD 30 MIN: CPT | Mod: S$GLB,,, | Performed by: INTERNAL MEDICINE

## 2024-09-16 PROCEDURE — 1126F AMNT PAIN NOTED NONE PRSNT: CPT | Mod: CPTII,S$GLB,, | Performed by: INTERNAL MEDICINE

## 2024-09-16 PROCEDURE — 99999 PR PBB SHADOW E&M-EST. PATIENT-LVL III: CPT | Mod: PBBFAC,,, | Performed by: INTERNAL MEDICINE

## 2024-09-16 PROCEDURE — 3074F SYST BP LT 130 MM HG: CPT | Mod: CPTII,S$GLB,, | Performed by: INTERNAL MEDICINE

## 2024-09-16 PROCEDURE — 3078F DIAST BP <80 MM HG: CPT | Mod: CPTII,S$GLB,, | Performed by: INTERNAL MEDICINE

## 2024-09-16 PROCEDURE — 1101F PT FALLS ASSESS-DOCD LE1/YR: CPT | Mod: CPTII,S$GLB,, | Performed by: INTERNAL MEDICINE

## 2024-09-16 PROCEDURE — 3288F FALL RISK ASSESSMENT DOCD: CPT | Mod: CPTII,S$GLB,, | Performed by: INTERNAL MEDICINE

## 2024-09-16 RX ORDER — VALSARTAN 80 MG/1
80 TABLET ORAL 2 TIMES DAILY
COMMUNITY
Start: 2024-07-31

## 2025-01-17 ENCOUNTER — TELEPHONE (OUTPATIENT)
Dept: HEMATOLOGY/ONCOLOGY | Facility: CLINIC | Age: 85
End: 2025-01-17
Payer: COMMERCIAL

## 2025-01-17 PROBLEM — R29.90 NEUROLOGICAL SYMPTOMS: Status: ACTIVE | Noted: 2025-01-17

## 2025-01-17 PROBLEM — Z71.89 ACP (ADVANCE CARE PLANNING): Status: ACTIVE | Noted: 2025-01-17

## 2025-01-17 PROBLEM — I48.0 PAROXYSMAL ATRIAL FIBRILLATION: Status: ACTIVE | Noted: 2025-01-17

## 2025-01-17 PROBLEM — R29.6 FALLS: Status: ACTIVE | Noted: 2025-01-17

## 2025-01-17 NOTE — TELEPHONE ENCOUNTER
"Spoke to pt's spouse who states pt w/ loss of feeling in bilateral lower extremities, bilateral hands, incontinence, and falling over onset x "a couple weeks" significantly worsening today. Advised her to take pt to ED immediately. Will update Dr. Hart. V/u.   "

## 2025-01-18 PROBLEM — R53.1 PROGRESSIVE FOCAL MOTOR WEAKNESS: Status: ACTIVE | Noted: 2025-01-18

## 2025-01-19 PROBLEM — M48.061 SPINAL STENOSIS OF LUMBAR REGION WITHOUT NEUROGENIC CLAUDICATION: Status: ACTIVE | Noted: 2025-01-19

## 2025-01-22 PROBLEM — D64.9 ANEMIA: Status: ACTIVE | Noted: 2025-01-22

## 2025-01-24 DIAGNOSIS — Z98.1 S/P CERVICAL SPINAL FUSION: Primary | ICD-10-CM

## 2025-01-24 PROBLEM — G95.20 COMPRESSION OF SPINAL CORD WITH MYELOPATHY: Status: ACTIVE | Noted: 2025-01-24

## 2025-01-24 PROBLEM — M53.2X1 ATLANTOAXIAL INSTABILITY: Status: ACTIVE | Noted: 2025-01-24

## 2025-01-25 PROBLEM — R33.9 URINARY RETENTION: Status: ACTIVE | Noted: 2025-01-25

## 2025-01-27 PROBLEM — R53.1 PROGRESSIVE FOCAL MOTOR WEAKNESS: Status: RESOLVED | Noted: 2025-01-18 | Resolved: 2025-01-27

## 2025-01-27 PROBLEM — R29.90 NEUROLOGICAL SYMPTOMS: Status: RESOLVED | Noted: 2025-01-17 | Resolved: 2025-01-27

## 2025-01-27 PROBLEM — M53.2X1 ATLANTOAXIAL INSTABILITY: Status: RESOLVED | Noted: 2025-01-24 | Resolved: 2025-01-27

## 2025-04-22 ENCOUNTER — TELEPHONE (OUTPATIENT)
Dept: DERMATOLOGY | Facility: CLINIC | Age: 85
End: 2025-04-22
Payer: COMMERCIAL

## 2025-04-22 NOTE — TELEPHONE ENCOUNTER
----- Message from [place] sent at 4/22/2025  9:48 AM CDT -----  Type: Needs Medical AdviceWho Called:  Nohemi (spouse)Symptoms (please be specific):  How long has patient had these symptoms:  Pharmacy name and phone #:  Best Call Back Number: [phone] Information: Nohemi is requesting a call back to schedule an appt. for her .